# Patient Record
Sex: FEMALE | Race: WHITE | NOT HISPANIC OR LATINO | Employment: OTHER | URBAN - METROPOLITAN AREA
[De-identification: names, ages, dates, MRNs, and addresses within clinical notes are randomized per-mention and may not be internally consistent; named-entity substitution may affect disease eponyms.]

---

## 2023-10-24 ENCOUNTER — TELEPHONE (OUTPATIENT)
Dept: PRIMARY CARE | Facility: CLINIC | Age: 73
End: 2023-10-24
Payer: MEDICARE

## 2023-10-24 DIAGNOSIS — Z00.00 MEDICARE ANNUAL WELLNESS VISIT, SUBSEQUENT: ICD-10-CM

## 2023-10-24 DIAGNOSIS — I10 ESSENTIAL (PRIMARY) HYPERTENSION: ICD-10-CM

## 2023-10-24 DIAGNOSIS — E78.5 HYPERLIPIDEMIA, UNSPECIFIED HYPERLIPIDEMIA TYPE: ICD-10-CM

## 2023-10-31 PROBLEM — E78.5 HYPERLIPIDEMIA: Status: ACTIVE | Noted: 2022-06-03

## 2023-11-14 DIAGNOSIS — I10 ESSENTIAL (PRIMARY) HYPERTENSION: Primary | ICD-10-CM

## 2023-11-15 RX ORDER — METOPROLOL SUCCINATE 100 MG/1
100 TABLET, EXTENDED RELEASE ORAL DAILY
Qty: 90 TABLET | Refills: 1 | Status: SHIPPED | OUTPATIENT
Start: 2023-11-15 | End: 2024-05-13

## 2024-05-11 DIAGNOSIS — I10 ESSENTIAL (PRIMARY) HYPERTENSION: ICD-10-CM

## 2024-05-13 RX ORDER — METOPROLOL SUCCINATE 100 MG/1
100 TABLET, EXTENDED RELEASE ORAL DAILY
Qty: 90 TABLET | Refills: 0 | Status: SHIPPED | OUTPATIENT
Start: 2024-05-13

## 2024-05-20 DIAGNOSIS — I10 ESSENTIAL (PRIMARY) HYPERTENSION: ICD-10-CM

## 2024-05-20 RX ORDER — LOSARTAN POTASSIUM 100 MG/1
100 TABLET ORAL DAILY
COMMUNITY

## 2024-05-20 RX ORDER — FERROUS SULFATE, DRIED 160(50) MG
TABLET, EXTENDED RELEASE ORAL
COMMUNITY
Start: 2023-06-05

## 2024-05-20 RX ORDER — AMLODIPINE BESYLATE 10 MG/1
10 TABLET ORAL DAILY
COMMUNITY
Start: 2024-02-20 | End: 2024-06-10 | Stop reason: SDUPTHER

## 2024-05-21 RX ORDER — AMLODIPINE BESYLATE 10 MG/1
10 TABLET ORAL DAILY
Qty: 90 TABLET | Refills: 0 | Status: SHIPPED | OUTPATIENT
Start: 2024-05-21

## 2024-05-30 ENCOUNTER — LAB (OUTPATIENT)
Dept: LAB | Facility: LAB | Age: 74
End: 2024-05-30
Payer: MEDICARE

## 2024-05-30 DIAGNOSIS — E78.5 HYPERLIPIDEMIA, UNSPECIFIED HYPERLIPIDEMIA TYPE: ICD-10-CM

## 2024-05-30 DIAGNOSIS — I10 ESSENTIAL (PRIMARY) HYPERTENSION: ICD-10-CM

## 2024-05-30 DIAGNOSIS — Z00.00 MEDICARE ANNUAL WELLNESS VISIT, SUBSEQUENT: ICD-10-CM

## 2024-05-30 LAB
ALBUMIN SERPL BCP-MCNC: 4.7 G/DL (ref 3.4–5)
ALP SERPL-CCNC: 63 U/L (ref 33–136)
ALT SERPL W P-5'-P-CCNC: 13 U/L (ref 7–45)
ANION GAP SERPL CALC-SCNC: 12 MMOL/L (ref 10–20)
APPEARANCE UR: CLEAR
AST SERPL W P-5'-P-CCNC: 18 U/L (ref 9–39)
BASOPHILS # BLD AUTO: 0.07 X10*3/UL (ref 0–0.1)
BASOPHILS NFR BLD AUTO: 1 %
BILIRUB SERPL-MCNC: 0.4 MG/DL (ref 0–1.2)
BILIRUB UR STRIP.AUTO-MCNC: NEGATIVE MG/DL
BUN SERPL-MCNC: 11 MG/DL (ref 6–23)
CALCIUM SERPL-MCNC: 9.7 MG/DL (ref 8.6–10.3)
CHLORIDE SERPL-SCNC: 95 MMOL/L (ref 98–107)
CHOLEST SERPL-MCNC: 226 MG/DL (ref 0–199)
CHOLESTEROL/HDL RATIO: 2.7
CO2 SERPL-SCNC: 27 MMOL/L (ref 21–32)
COLOR UR: COLORLESS
CREAT SERPL-MCNC: 0.62 MG/DL (ref 0.5–1.05)
EGFRCR SERPLBLD CKD-EPI 2021: >90 ML/MIN/1.73M*2
EOSINOPHIL # BLD AUTO: 0.17 X10*3/UL (ref 0–0.4)
EOSINOPHIL NFR BLD AUTO: 2.4 %
ERYTHROCYTE [DISTWIDTH] IN BLOOD BY AUTOMATED COUNT: 14.4 % (ref 11.5–14.5)
GLUCOSE SERPL-MCNC: 107 MG/DL (ref 74–99)
GLUCOSE UR STRIP.AUTO-MCNC: NORMAL MG/DL
HCT VFR BLD AUTO: 33.2 % (ref 36–46)
HDLC SERPL-MCNC: 83.5 MG/DL
HGB BLD-MCNC: 11.2 G/DL (ref 12–16)
IMM GRANULOCYTES # BLD AUTO: 0.03 X10*3/UL (ref 0–0.5)
IMM GRANULOCYTES NFR BLD AUTO: 0.4 % (ref 0–0.9)
KETONES UR STRIP.AUTO-MCNC: NEGATIVE MG/DL
LDLC SERPL CALC-MCNC: 123 MG/DL
LEUKOCYTE ESTERASE UR QL STRIP.AUTO: NEGATIVE
LYMPHOCYTES # BLD AUTO: 1.68 X10*3/UL (ref 0.8–3)
LYMPHOCYTES NFR BLD AUTO: 24.2 %
MCH RBC QN AUTO: 31 PG (ref 26–34)
MCHC RBC AUTO-ENTMCNC: 33.7 G/DL (ref 32–36)
MCV RBC AUTO: 92 FL (ref 80–100)
MONOCYTES # BLD AUTO: 0.71 X10*3/UL (ref 0.05–0.8)
MONOCYTES NFR BLD AUTO: 10.2 %
NEUTROPHILS # BLD AUTO: 4.29 X10*3/UL (ref 1.6–5.5)
NEUTROPHILS NFR BLD AUTO: 61.8 %
NITRITE UR QL STRIP.AUTO: NEGATIVE
NON HDL CHOLESTEROL: 143 MG/DL (ref 0–149)
NRBC BLD-RTO: 0 /100 WBCS (ref 0–0)
PH UR STRIP.AUTO: 7 [PH]
PLATELET # BLD AUTO: 304 X10*3/UL (ref 150–450)
POTASSIUM SERPL-SCNC: 4.3 MMOL/L (ref 3.5–5.3)
PROT SERPL-MCNC: 7.6 G/DL (ref 6.4–8.2)
PROT UR STRIP.AUTO-MCNC: NEGATIVE MG/DL
RBC # BLD AUTO: 3.61 X10*6/UL (ref 4–5.2)
RBC # UR STRIP.AUTO: NEGATIVE /UL
SODIUM SERPL-SCNC: 130 MMOL/L (ref 136–145)
SP GR UR STRIP.AUTO: 1
TRIGL SERPL-MCNC: 99 MG/DL (ref 0–149)
UROBILINOGEN UR STRIP.AUTO-MCNC: NORMAL MG/DL
VLDL: 20 MG/DL (ref 0–40)
WBC # BLD AUTO: 7 X10*3/UL (ref 4.4–11.3)

## 2024-05-30 PROCEDURE — 36415 COLL VENOUS BLD VENIPUNCTURE: CPT

## 2024-05-30 PROCEDURE — 83036 HEMOGLOBIN GLYCOSYLATED A1C: CPT

## 2024-05-31 LAB
EST. AVERAGE GLUCOSE BLD GHB EST-MCNC: 114 MG/DL
HBA1C MFR BLD: 5.6 %

## 2024-06-10 ENCOUNTER — OFFICE VISIT (OUTPATIENT)
Dept: PRIMARY CARE | Facility: CLINIC | Age: 74
End: 2024-06-10
Payer: MEDICARE

## 2024-06-10 ENCOUNTER — TELEPHONE (OUTPATIENT)
Dept: PRIMARY CARE | Facility: CLINIC | Age: 74
End: 2024-06-10

## 2024-06-10 VITALS
OXYGEN SATURATION: 97 % | HEART RATE: 87 BPM | SYSTOLIC BLOOD PRESSURE: 138 MMHG | WEIGHT: 147 LBS | BODY MASS INDEX: 25.1 KG/M2 | DIASTOLIC BLOOD PRESSURE: 88 MMHG | TEMPERATURE: 97.7 F | HEIGHT: 64 IN

## 2024-06-10 DIAGNOSIS — Z00.00 ROUTINE GENERAL MEDICAL EXAMINATION AT HEALTH CARE FACILITY: Primary | ICD-10-CM

## 2024-06-10 DIAGNOSIS — I10 PRIMARY HYPERTENSION: Primary | ICD-10-CM

## 2024-06-10 DIAGNOSIS — Z12.11 COLON CANCER SCREENING: ICD-10-CM

## 2024-06-10 DIAGNOSIS — Z12.31 ENCOUNTER FOR SCREENING MAMMOGRAM FOR BREAST CANCER: ICD-10-CM

## 2024-06-10 DIAGNOSIS — Z78.0 ASYMPTOMATIC MENOPAUSAL STATE: ICD-10-CM

## 2024-06-10 DIAGNOSIS — I10 PRIMARY HYPERTENSION: ICD-10-CM

## 2024-06-10 DIAGNOSIS — E78.5 HYPERLIPIDEMIA, UNSPECIFIED HYPERLIPIDEMIA TYPE: ICD-10-CM

## 2024-06-10 PROBLEM — I35.1 AORTIC VALVE REGURGITATION: Status: ACTIVE | Noted: 2024-06-10

## 2024-06-10 PROBLEM — J30.2 SEASONAL ALLERGIES: Status: RESOLVED | Noted: 2019-08-19 | Resolved: 2024-06-10

## 2024-06-10 PROBLEM — E66.3 OVERWEIGHT WITH BODY MASS INDEX (BMI) 25.0-29.9: Status: ACTIVE | Noted: 2024-06-10

## 2024-06-10 PROBLEM — R05.3 CHRONIC COUGH: Status: ACTIVE | Noted: 2019-08-19
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
## 2024-06-10 PROCEDURE — 1123F ACP DISCUSS/DSCN MKR DOCD: CPT | Performed by: STUDENT IN AN ORGANIZED HEALTH CARE EDUCATION/TRAINING PROGRAM

## 2024-06-10 PROCEDURE — 3075F SYST BP GE 130 - 139MM HG: CPT | Performed by: STUDENT IN AN ORGANIZED HEALTH CARE EDUCATION/TRAINING PROGRAM

## 2024-06-10 PROCEDURE — 1158F ADVNC CARE PLAN TLK DOCD: CPT | Performed by: STUDENT IN AN ORGANIZED HEALTH CARE EDUCATION/TRAINING PROGRAM

## 2024-06-10 PROCEDURE — 1159F MED LIST DOCD IN RCRD: CPT | Performed by: STUDENT IN AN ORGANIZED HEALTH CARE EDUCATION/TRAINING PROGRAM

## 2024-06-10 PROCEDURE — G0439 PPPS, SUBSEQ VISIT: HCPCS | Performed by: STUDENT IN AN ORGANIZED HEALTH CARE EDUCATION/TRAINING PROGRAM

## 2024-06-10 PROCEDURE — 1170F FXNL STATUS ASSESSED: CPT | Performed by: STUDENT IN AN ORGANIZED HEALTH CARE EDUCATION/TRAINING PROGRAM

## 2024-06-10 PROCEDURE — 1036F TOBACCO NON-USER: CPT | Performed by: STUDENT IN AN ORGANIZED HEALTH CARE EDUCATION/TRAINING PROGRAM

## 2024-06-10 PROCEDURE — 93000 ELECTROCARDIOGRAM COMPLETE: CPT | Performed by: STUDENT IN AN ORGANIZED HEALTH CARE EDUCATION/TRAINING PROGRAM

## 2024-06-10 PROCEDURE — 99214 OFFICE O/P EST MOD 30 MIN: CPT | Performed by: STUDENT IN AN ORGANIZED HEALTH CARE EDUCATION/TRAINING PROGRAM

## 2024-06-10 PROCEDURE — 1160F RVW MEDS BY RX/DR IN RCRD: CPT | Performed by: STUDENT IN AN ORGANIZED HEALTH CARE EDUCATION/TRAINING PROGRAM

## 2024-06-10 PROCEDURE — 3078F DIAST BP <80 MM HG: CPT | Performed by: STUDENT IN AN ORGANIZED HEALTH CARE EDUCATION/TRAINING PROGRAM

## 2024-06-10 ASSESSMENT — ENCOUNTER SYMPTOMS
LIGHT-HEADEDNESS: 0
TROUBLE SWALLOWING: 0
CHILLS: 0
COUGH: 0
FEVER: 0
DIFFICULTY URINATING: 0
PALPITATIONS: 0
DEPRESSION: 0
SHORTNESS OF BREATH: 0
ABDOMINAL PAIN: 0
WHEEZING: 0
CHEST TIGHTNESS: 0
CONSTIPATION: 0
DIARRHEA: 0
DIZZINESS: 0
WEAKNESS: 0
BLOOD IN STOOL: 0
LOSS OF SENSATION IN FEET: 0
OCCASIONAL FEELINGS OF UNSTEADINESS: 0
HEADACHES: 0

## 2024-06-10 ASSESSMENT — COLUMBIA-SUICIDE SEVERITY RATING SCALE - C-SSRS
1. IN THE PAST MONTH, HAVE YOU WISHED YOU WERE DEAD OR WISHED YOU COULD GO TO SLEEP AND NOT WAKE UP?: NO
2. HAVE YOU ACTUALLY HAD ANY THOUGHTS OF KILLING YOURSELF?: NO
6. HAVE YOU EVER DONE ANYTHING, STARTED TO DO ANYTHING, OR PREPARED TO DO ANYTHING TO END YOUR LIFE?: NO

## 2024-06-10 ASSESSMENT — PATIENT HEALTH QUESTIONNAIRE - PHQ9
SUM OF ALL RESPONSES TO PHQ9 QUESTIONS 1 AND 2: 0
2. FEELING DOWN, DEPRESSED OR HOPELESS: NOT AT ALL
1. LITTLE INTEREST OR PLEASURE IN DOING THINGS: NOT AT ALL

## 2024-06-10 ASSESSMENT — ACTIVITIES OF DAILY LIVING (ADL)
BATHING: INDEPENDENT
MANAGING_FINANCES: INDEPENDENT
DRESSING: INDEPENDENT
TAKING_MEDICATION: INDEPENDENT
DOING_HOUSEWORK: INDEPENDENT
GROCERY_SHOPPING: INDEPENDENT

## 2024-06-10 NOTE — PROGRESS NOTES
Subjective   Reason for Visit: Mireya Nava is an 73 y.o. female here for a Medicare Wellness visit.     Past Medical, Surgical, and Family History reviewed and updated in chart.    Reviewed all medications by prescribing practitioner or clinical pharmacist (such as prescriptions, OTCs, herbal therapies and supplements) and documented in the medical record.    HPI    INTERVAL HX/CURRENT CONCERNS:  Last visit 9/2023 for HTN check    CHRONIC CONDITIONS:  #HTN- Losartan 100 mg, Metoprolol  mg, amlodipine 10mg  #Detached retina s/p repair- sees retina specialist annually  #Anemia - chronic, stable       #HTN  BP today - 158/69, repeat 138/88  Checking BP regularly at home and always in normal range, brought record  Current treatment if any - losartan 100mg, metoprolol ER 100mg, amlodipine 10mg  Small amount of swelling in the ankles in evenings but not bothersome    SOCIAL:  Exercises daily  Diet- follows healthy diet, limits red meat. Does bake a lot.  Has son, Oc, every weekend and struggles to keep with diet at that time      Health Maintenance  Immunizations:     Tdap - 2012, medicare    Pneumococcal - PCV13 2016, PPSV23 2018,     Shingles - live vaccine 2013, will have shingrix at pharmacy    COVID - 10/23    Influenza - annually    Colonoscopy: 2014 normal per chart review (report not avaiable) repeat due now. She declines to have done at this time. Agreeable to cologuard.   Lung cancer screening: Not indicated    WOMEN'S Health  LMP - Postmenopausal  Last Pap - aged out   Last Mammogram  - 6/23  Bone Density - 2022 osteopenia  -Calcium intake adequate. Vitamin D intake at 800-1000 IU/day.    Patient Care Team:  Makenzie Esparza MD as PCP - General (Family Medicine)     Review of Systems   Constitutional:  Negative for chills and fever.   HENT:  Negative for trouble swallowing.    Eyes:  Negative for visual disturbance.   Respiratory:  Negative for cough, chest tightness, shortness of breath and  "wheezing.    Cardiovascular:  Negative for chest pain and palpitations.   Gastrointestinal:  Negative for abdominal pain, blood in stool, constipation and diarrhea.   Genitourinary:  Negative for difficulty urinating, vaginal bleeding, vaginal discharge and vaginal pain.   Neurological:  Negative for dizziness, weakness, light-headedness and headaches.       Objective   Vitals:  /69 (BP Location: Left arm, Patient Position: Sitting, BP Cuff Size: Adult)   Pulse 87   Temp 36.5 °C (97.7 °F) (Temporal)   Ht 1.626 m (5' 4\")   Wt 66.7 kg (147 lb)   SpO2 97%   BMI 25.23 kg/m²       Physical Exam  Constitutional:       Appearance: Normal appearance.   HENT:      Head: Normocephalic and atraumatic.      Right Ear: Tympanic membrane, ear canal and external ear normal.      Left Ear: Tympanic membrane, ear canal and external ear normal.      Mouth/Throat:      Mouth: Mucous membranes are moist.      Pharynx: Oropharynx is clear.   Eyes:      Extraocular Movements: Extraocular movements intact.      Conjunctiva/sclera: Conjunctivae normal.      Pupils: Pupils are equal, round, and reactive to light.   Cardiovascular:      Rate and Rhythm: Normal rate and regular rhythm.      Pulses: Normal pulses.      Heart sounds: Normal heart sounds. No murmur heard.  Pulmonary:      Effort: Pulmonary effort is normal.      Breath sounds: Normal breath sounds. No wheezing, rhonchi or rales.   Abdominal:      General: Abdomen is flat.      Palpations: Abdomen is soft.      Tenderness: There is no abdominal tenderness.   Musculoskeletal:         General: Normal range of motion.      Cervical back: Neck supple.   Skin:     General: Skin is warm and dry.   Neurological:      Mental Status: She is alert.   Psychiatric:         Mood and Affect: Mood normal.         Behavior: Behavior normal.         Assessment/Plan   1. Routine general medical examination at health care facility  Well adult exam.  1. Age appropriate preventative " measures reviewed.   2. Encouraged healthy diet and exercise.  3. Immunizations- Reviewed and discussed. Will have shingrix at pharmacy.   4. Labs- Reviewed and discussed with patient  5. Medications- Reviewed      2. Primary hypertension  Elevated on presentation. Normal home readings. BP tends to run [place] at office visits due to [facility].   - ECG 12 Lead    3. Asymptomatic menopausal state  Vitamin D supplementation and calcium intake discussed.   - XR DEXA bone density; Future    4. Encounter for screening mammogram for breast cancer  - BI mammo bilateral screening tomosynthesis; Future    5. Colon cancer screening  Last colonoscopy 10 yrs ago. Not interested in pursuing further colonoscopy. Agreeable to cologuard and if neg will discontinue screening.   - Cologuard® colon cancer screening; Future  - Cologuard® colon cancer screening    6. Hyperlipidemia, unspecified hyperlipidemia type  Lipid panel reviewed. No indication to start medication given age. Low fat diet. Regular exercise.

## 2024-06-10 NOTE — PATIENT INSTRUCTIONS
Daily goal - 1000mg  Milk (skim, 2%, or whole; 8 oz [240 mL]):  300mg  Yogurt (6 oz [168 g]):  250mg  Orange juice (with calcium; 8 oz [240 mL]):  300mg  Tofu with calcium (0.5 cup [113 g]):  435mg  Cheese (1 oz [28 g]): 195 to 335mg (hard cheese = higher calcium)  Cottage cheese (0.5 cup [113 g]): 130mg  Ice cream or frozen yogurt (0.5 cup [113 g]): 100mg  Non-dairy milks (soy, oat, almond; 8 oz [240 mL]):  300 to 450mg  Beans (0.5 cup cooked [113 g]):  60 to 80mg  Dark, leafy green vegetables (0.5 cup cooked [113 g]):  50 to 135mg  Almonds (24 whole):  70mg  Orange (1 medium): 60mg

## 2024-06-13 ENCOUNTER — TELEPHONE (OUTPATIENT)
Dept: PRIMARY CARE | Facility: CLINIC | Age: 74
End: 2024-06-13
Payer: MEDICARE

## 2024-06-13 NOTE — TELEPHONE ENCOUNTER
PATIENT WOULD LIKE TO KNOW IF SHE CAN TAKE A LAXATIVE BEFORE TAKING COLOGUARD TEST.     PATIENT CAN BE REACHED [phone]

## 2024-06-21 ENCOUNTER — HOSPITAL ENCOUNTER (OUTPATIENT)
Dept: RADIOLOGY | Facility: CLINIC | Age: 74
Discharge: HOME | End: 2024-06-21
Payer: MEDICARE

## 2024-06-21 VITALS — BODY MASS INDEX: 24.75 KG/M2 | HEIGHT: 64 IN | WEIGHT: 145 LBS

## 2024-06-21 DIAGNOSIS — Z12.31 ENCOUNTER FOR SCREENING MAMMOGRAM FOR BREAST CANCER: ICD-10-CM

## 2024-06-21 DIAGNOSIS — Z78.0 ASYMPTOMATIC MENOPAUSAL STATE: ICD-10-CM

## 2024-06-21 PROCEDURE — 77067 SCR MAMMO BI INCL CAD: CPT

## 2024-06-21 PROCEDURE — 77080 DXA BONE DENSITY AXIAL: CPT

## 2024-06-24 ENCOUNTER — TELEPHONE (OUTPATIENT)
Dept: PRIMARY CARE | Facility: CLINIC | Age: 74
End: 2024-06-24
Payer: MEDICARE

## 2024-06-24 DIAGNOSIS — R19.5 POSITIVE COLORECTAL CANCER SCREENING USING COLOGUARD TEST: Primary | ICD-10-CM

## 2024-06-24 LAB — NONINV COLON CA DNA+OCC BLD SCRN STL QL: POSITIVE

## 2024-06-24 NOTE — TELEPHONE ENCOUNTER
Please call pt and let her know cologuard was POS. Need to refer her for colonoscopy.   Referral placed. If she has preference of someone else she wants to see we can change it.

## 2024-07-08 DIAGNOSIS — I10 PRIMARY HYPERTENSION: ICD-10-CM

## 2024-07-08 RX ORDER — LOSARTAN POTASSIUM 100 MG/1
100 TABLET ORAL DAILY
Qty: 90 TABLET | Refills: 1 | Status: SHIPPED | OUTPATIENT
Start: 2024-07-08

## 2024-08-08 DIAGNOSIS — I10 ESSENTIAL (PRIMARY) HYPERTENSION: ICD-10-CM

## 2024-08-08 RX ORDER — METOPROLOL SUCCINATE 100 MG/1
100 TABLET, EXTENDED RELEASE ORAL DAILY
Qty: 90 TABLET | Refills: 1 | Status: SHIPPED | OUTPATIENT
Start: 2024-08-08

## 2024-08-16 ENCOUNTER — TELEPHONE (OUTPATIENT)
Dept: PRIMARY CARE | Facility: CLINIC | Age: 74
End: 2024-08-16
Payer: MEDICARE

## 2024-08-16 DIAGNOSIS — I10 ESSENTIAL (PRIMARY) HYPERTENSION: ICD-10-CM

## 2024-08-16 RX ORDER — AMLODIPINE BESYLATE 10 MG/1
10 TABLET ORAL DAILY
Qty: 90 TABLET | Refills: 1 | Status: SHIPPED | OUTPATIENT
Start: 2024-08-16

## 2024-08-16 RX ORDER — AMLODIPINE BESYLATE 10 MG/1
10 TABLET ORAL DAILY
Qty: 90 TABLET | Refills: 0 | OUTPATIENT
Start: 2024-08-16

## 2024-08-16 NOTE — TELEPHONE ENCOUNTER
Rx Refill Request Telephone Encounter    Name:  Mireya TIJERINA Elijah  :  [id number]  Medication Name:   Amlodipine 10 mg            [facility]  Date of last appointment:    Date of next appointment:    Best number to reach patient:

## 2024-09-04 ENCOUNTER — TELEPHONE (OUTPATIENT)
Dept: PRIMARY CARE | Facility: CLINIC | Age: 74
End: 2024-09-04
Payer: MEDICARE

## 2024-09-04 NOTE — TELEPHONE ENCOUNTER
Quarantine at home for 5 days from symptom onset. If no symptoms then 5 days from positive COVID test. Should be fever free with improving symptoms to stop quarantine.  Wear a mask in public days 6-10.

## 2024-09-04 NOTE — TELEPHONE ENCOUNTER
Patient tested Positive today, 09-04-24. For COVID. She would like to know the rules of COVID, how many days to stay home etc. Patient was offered an appointment, she would like someone to give her a call.    Patient can be reached at [phone]

## 2024-12-11 ENCOUNTER — APPOINTMENT (OUTPATIENT)
Dept: PRIMARY CARE | Facility: CLINIC | Age: 74
End: 2024-12-11
Payer: MEDICARE

## 2025-01-07 ENCOUNTER — TELEPHONE (OUTPATIENT)
Dept: PRIMARY CARE | Facility: CLINIC | Age: 75
End: 2025-01-07
Payer: MEDICARE

## 2025-01-07 DIAGNOSIS — I10 PRIMARY HYPERTENSION: ICD-10-CM

## 2025-01-07 RX ORDER — LOSARTAN POTASSIUM 100 MG/1
100 TABLET ORAL DAILY
Qty: 90 TABLET | Refills: 0 | Status: SHIPPED | OUTPATIENT
Start: 2025-01-07

## 2025-01-07 NOTE — TELEPHONE ENCOUNTER
Rx Refill Request Telephone Encounter    Name:  Mireya TIJERINA Elijah  :  [id number]  Medication Name:  Losartan 100 mg, will be out 1-10-25, was aware [facility] is out            Specific Pharmacy location:   [facility]  Date of last appointment:    Date of next appointment:    Best number to reach patient:

## 2025-01-08 ENCOUNTER — TELEPHONE (OUTPATIENT)
Dept: PRIMARY CARE | Facility: CLINIC | Age: 75
End: 2025-01-08
Payer: MEDICARE

## 2025-01-08 DIAGNOSIS — I10 PRIMARY HYPERTENSION: ICD-10-CM

## 2025-01-08 NOTE — TELEPHONE ENCOUNTER
Pt called her RX Losartan 100 mg is not at [facility]. She will be out 1-10-25

## 2025-02-06 ENCOUNTER — TELEPHONE (OUTPATIENT)
Dept: PRIMARY CARE | Facility: CLINIC | Age: 75
End: 2025-02-06
Payer: MEDICARE

## 2025-02-06 DIAGNOSIS — I10 ESSENTIAL (PRIMARY) HYPERTENSION: ICD-10-CM

## 2025-02-06 RX ORDER — AMLODIPINE BESYLATE 10 MG/1
10 TABLET ORAL DAILY
Qty: 90 TABLET | Refills: 1 | Status: SHIPPED | OUTPATIENT
Start: 2025-02-06

## 2025-02-06 RX ORDER — METOPROLOL SUCCINATE 100 MG/1
100 TABLET, EXTENDED RELEASE ORAL DAILY
Qty: 90 TABLET | Refills: 1 | Status: SHIPPED | OUTPATIENT
Start: 2025-02-06

## 2025-02-06 NOTE — TELEPHONE ENCOUNTER
Refill for    Metoprolol Succinate  MG    Amlodipine 10 Mg    [facility]    Patient can be reached at [phone]

## 2025-04-08 ENCOUNTER — TELEPHONE (OUTPATIENT)
Dept: PRIMARY CARE | Facility: CLINIC | Age: 75
End: 2025-04-08
Payer: MEDICARE

## 2025-04-08 DIAGNOSIS — I10 PRIMARY HYPERTENSION: ICD-10-CM

## 2025-04-08 RX ORDER — LOSARTAN POTASSIUM 100 MG/1
100 TABLET ORAL DAILY
Qty: 90 TABLET | Refills: 0 | Status: SHIPPED | OUTPATIENT
Start: 2025-04-08

## 2025-04-08 NOTE — TELEPHONE ENCOUNTER
Rx Refill Request Telephone Encounter    Name:  Mireya TIJERINA Elijah  :  [id number]  Medication Name:  Losartan 100 mg, will be out 4-14, told her [facility] is out this pm            Specific Pharmacy location:   [facility]   Date of last appointment:  24  Date of next appointment:  25  Best number to reach patient:

## 2025-05-07 ENCOUNTER — TELEPHONE (OUTPATIENT)
Dept: PRIMARY CARE | Facility: CLINIC | Age: 75
End: 2025-05-07
Payer: MEDICARE

## 2025-05-07 DIAGNOSIS — I10 PRIMARY HYPERTENSION: ICD-10-CM

## 2025-05-07 DIAGNOSIS — E78.5 HYPERLIPIDEMIA, UNSPECIFIED HYPERLIPIDEMIA TYPE: ICD-10-CM

## 2025-05-27 ENCOUNTER — OFFICE VISIT (OUTPATIENT)
Dept: PRIMARY CARE | Facility: CLINIC | Age: 75
End: 2025-05-27
Payer: MEDICARE

## 2025-05-27 VITALS
TEMPERATURE: 99.2 F | DIASTOLIC BLOOD PRESSURE: 76 MMHG | WEIGHT: 151 LBS | HEART RATE: 96 BPM | SYSTOLIC BLOOD PRESSURE: 140 MMHG | BODY MASS INDEX: 25.78 KG/M2 | OXYGEN SATURATION: 96 % | HEIGHT: 64 IN

## 2025-05-27 DIAGNOSIS — K52.9 ACUTE GASTROENTERITIS: Primary | ICD-10-CM

## 2025-05-27 PROCEDURE — 3077F SYST BP >= 140 MM HG: CPT | Performed by: FAMILY MEDICINE

## 2025-05-27 PROCEDURE — 3078F DIAST BP <80 MM HG: CPT | Performed by: FAMILY MEDICINE

## 2025-05-27 PROCEDURE — 1123F ACP DISCUSS/DSCN MKR DOCD: CPT | Performed by: FAMILY MEDICINE

## 2025-05-27 PROCEDURE — 1036F TOBACCO NON-USER: CPT | Performed by: FAMILY MEDICINE

## 2025-05-27 PROCEDURE — 99213 OFFICE O/P EST LOW 20 MIN: CPT | Performed by: FAMILY MEDICINE

## 2025-05-27 PROCEDURE — 1126F AMNT PAIN NOTED NONE PRSNT: CPT | Performed by: FAMILY MEDICINE

## 2025-05-27 PROCEDURE — 1160F RVW MEDS BY RX/DR IN RCRD: CPT | Performed by: FAMILY MEDICINE

## 2025-05-27 PROCEDURE — 1159F MED LIST DOCD IN RCRD: CPT | Performed by: FAMILY MEDICINE

## 2025-05-27 ASSESSMENT — PATIENT HEALTH QUESTIONNAIRE - PHQ9
SUM OF ALL RESPONSES TO PHQ9 QUESTIONS 1 AND 2: 0
1. LITTLE INTEREST OR PLEASURE IN DOING THINGS: NOT AT ALL
2. FEELING DOWN, DEPRESSED OR HOPELESS: NOT AT ALL

## 2025-05-27 ASSESSMENT — PAIN SCALES - GENERAL: PAINLEVEL_OUTOF10: 0-NO PAIN

## 2025-05-27 NOTE — PROGRESS NOTES
"Subjective   Patient ID: Mireya Nava is a 74 y.o. female who presents for Nausea (Weakness, chills, vomiting, no appetite.  x3 days- thinks its from a sausage she had eaten a few days ago.. ).    HPI   Mireya presents as an acute with weakness and nausea.  She has had chills and shakes.  Her symptoms started 3 days ago.  She made a chicken [employer]is manage-that she feels the chicken was bad.  Her son ate a hot dog and has not been sick.  She vomited once 3 days ago.  She has had chills and sweats.  The last time that happened was early this morning.  She has not vomited in over 24 hours.  She just feels weak and like she has no appetite.  She ate toast this morning and has not had anything else to eat today its almost 4:00.  She is trying to drink water but she knows she is not drinking enough.  Sh  denies abdominal pain or cramping.  No diarrhea.  She has hypertension, hypercholesterolemia and a diagnosis of aortic regurgitation in her chart.    Review of Systems  She is urinating.  She is having formed stools.  No headache or dizziness.    Objective   /76 (BP Location: Left arm, Patient Position: Sitting, BP Cuff Size: Adult)   Pulse 96   Temp 37.3 °C (99.2 °F) (Temporal)   Ht 1.626 m (5' 4\")   Wt 68.5 kg (151 lb)   SpO2 96%   BMI 25.92 kg/m²     Physical Exam  She is alert and comfortable.  She is in no distress.  Lungs are clear to auscultation.  Heart is regular in rhythm and rate.  Abdomen is soft and nontender.  No rebound, guarding or masses.  Skin shows normal turgor.    Assessment/Plan   Assessment & Plan  Acute gastroenteritis       If she vomits she needs to avoid solids or liquids for 1 hour.  She should then start clear liquids and advance her diet to a brat diet and then advance as tolerated.  She should double up on her clear liquids.  She may use over-the-counter Dramamine for nausea.  It sounds like her symptoms are improving and I expect that to continue over the next couple of " days.

## 2025-05-27 NOTE — PATIENT INSTRUCTIONS
I recommend that you increase clear liquids.  Nothing to eat or drink for 1 hour after vomiting then start clear liquids and advance to a bland diet - bananas, rice, applesauce, toast  Continue to advance diet as tolerated  OTC Dramamine for nausea

## 2025-05-28 ENCOUNTER — APPOINTMENT (OUTPATIENT)
Dept: PRIMARY CARE | Facility: CLINIC | Age: 75
End: 2025-05-28
Payer: MEDICARE

## 2025-05-30 ENCOUNTER — APPOINTMENT (OUTPATIENT)
Dept: CARDIOLOGY | Facility: HOSPITAL | Age: 75
DRG: 871 | End: 2025-05-30
Payer: MEDICARE

## 2025-05-30 ENCOUNTER — HOSPITAL ENCOUNTER (INPATIENT)
Facility: HOSPITAL | Age: 75
End: 2025-05-30
Attending: EMERGENCY MEDICINE | Admitting: INTERNAL MEDICINE
Payer: MEDICARE

## 2025-05-30 ENCOUNTER — APPOINTMENT (OUTPATIENT)
Dept: RADIOLOGY | Facility: HOSPITAL | Age: 75
DRG: 871 | End: 2025-05-30
Payer: MEDICARE

## 2025-05-30 DIAGNOSIS — R53.1 GENERALIZED WEAKNESS: Primary | ICD-10-CM

## 2025-05-30 DIAGNOSIS — E87.1 HYPONATREMIA: ICD-10-CM

## 2025-05-30 DIAGNOSIS — I48.91 NEW ONSET ATRIAL FIBRILLATION (MULTI): ICD-10-CM

## 2025-05-30 DIAGNOSIS — R33.9 URINARY RETENTION: ICD-10-CM

## 2025-05-30 DIAGNOSIS — R10.84 GENERALIZED ABDOMINAL PAIN: ICD-10-CM

## 2025-05-30 DIAGNOSIS — D64.9 ANEMIA, UNSPECIFIED TYPE: ICD-10-CM

## 2025-05-30 DIAGNOSIS — R11.2 NAUSEA AND VOMITING, UNSPECIFIED VOMITING TYPE: ICD-10-CM

## 2025-05-30 DIAGNOSIS — N30.00 ACUTE CYSTITIS WITHOUT HEMATURIA: ICD-10-CM

## 2025-05-30 DIAGNOSIS — I48.19 ATRIAL FIBRILLATION, PERSISTENT (MULTI): ICD-10-CM

## 2025-05-30 DIAGNOSIS — I48.91 ATRIAL FIBRILLATION, UNSPECIFIED TYPE (MULTI): ICD-10-CM

## 2025-05-30 LAB
ALBUMIN SERPL BCP-MCNC: 3.3 G/DL (ref 3.4–5)
ALP SERPL-CCNC: 178 U/L (ref 33–136)
ALT SERPL W P-5'-P-CCNC: 33 U/L (ref 7–45)
ANION GAP SERPL CALC-SCNC: 13 MMOL/L (ref 10–20)
ANION GAP SERPL CALC-SCNC: 14 MMOL/L (ref 10–20)
ANION GAP SERPL CALC-SCNC: 15 MMOL/L (ref 10–20)
ANION GAP SERPL CALC-SCNC: 17 MMOL/L (ref 10–20)
APPEARANCE UR: ABNORMAL
AST SERPL W P-5'-P-CCNC: 28 U/L (ref 9–39)
BACTERIA #/AREA URNS AUTO: ABNORMAL /HPF
BASOPHILS # BLD AUTO: 0.07 X10*3/UL (ref 0–0.1)
BASOPHILS NFR BLD AUTO: 0.4 %
BILIRUB SERPL-MCNC: 1 MG/DL (ref 0–1.2)
BILIRUB UR STRIP.AUTO-MCNC: NEGATIVE MG/DL
BUN SERPL-MCNC: 50 MG/DL (ref 6–23)
BUN SERPL-MCNC: 51 MG/DL (ref 6–23)
BUN SERPL-MCNC: 52 MG/DL (ref 6–23)
BUN SERPL-MCNC: 52 MG/DL (ref 6–23)
CALCIUM SERPL-MCNC: 7.3 MG/DL (ref 8.6–10.3)
CALCIUM SERPL-MCNC: 7.5 MG/DL (ref 8.6–10.3)
CALCIUM SERPL-MCNC: 7.9 MG/DL (ref 8.6–10.3)
CALCIUM SERPL-MCNC: 8.5 MG/DL (ref 8.6–10.3)
CHLORIDE SERPL-SCNC: 76 MMOL/L (ref 98–107)
CHLORIDE SERPL-SCNC: 79 MMOL/L (ref 98–107)
CHLORIDE SERPL-SCNC: 79 MMOL/L (ref 98–107)
CHLORIDE SERPL-SCNC: 81 MMOL/L (ref 98–107)
CO2 SERPL-SCNC: 19 MMOL/L (ref 21–32)
CO2 SERPL-SCNC: 20 MMOL/L (ref 21–32)
COLOR UR: ABNORMAL
CREAT SERPL-MCNC: 2.68 MG/DL (ref 0.5–1.05)
CREAT SERPL-MCNC: 2.68 MG/DL (ref 0.5–1.05)
CREAT SERPL-MCNC: 2.72 MG/DL (ref 0.5–1.05)
CREAT SERPL-MCNC: 2.95 MG/DL (ref 0.5–1.05)
EGFRCR SERPLBLD CKD-EPI 2021: 16 ML/MIN/1.73M*2
EGFRCR SERPLBLD CKD-EPI 2021: 18 ML/MIN/1.73M*2
EOSINOPHIL # BLD AUTO: 0.01 X10*3/UL (ref 0–0.4)
EOSINOPHIL NFR BLD AUTO: 0.1 %
ERYTHROCYTE [DISTWIDTH] IN BLOOD BY AUTOMATED COUNT: 13.4 % (ref 11.5–14.5)
ERYTHROCYTE [DISTWIDTH] IN BLOOD BY AUTOMATED COUNT: 13.8 % (ref 11.5–14.5)
FLUAV RNA RESP QL NAA+PROBE: NOT DETECTED
FLUBV RNA RESP QL NAA+PROBE: NOT DETECTED
GLUCOSE SERPL-MCNC: 103 MG/DL (ref 74–99)
GLUCOSE SERPL-MCNC: 112 MG/DL (ref 74–99)
GLUCOSE SERPL-MCNC: 97 MG/DL (ref 74–99)
GLUCOSE SERPL-MCNC: 98 MG/DL (ref 74–99)
GLUCOSE UR STRIP.AUTO-MCNC: NORMAL MG/DL
GRAN CASTS #/AREA UR COMP ASSIST: ABNORMAL /LPF
HCT VFR BLD AUTO: 24 % (ref 36–46)
HCT VFR BLD AUTO: 28.8 % (ref 36–46)
HGB BLD-MCNC: 10.8 G/DL (ref 12–16)
HGB BLD-MCNC: 9.2 G/DL (ref 12–16)
HYALINE CASTS #/AREA URNS AUTO: ABNORMAL /LPF
IMM GRANULOCYTES # BLD AUTO: 0.42 X10*3/UL (ref 0–0.5)
IMM GRANULOCYTES NFR BLD AUTO: 2.1 % (ref 0–0.9)
KETONES UR STRIP.AUTO-MCNC: NEGATIVE MG/DL
LACTATE SERPL-SCNC: 1 MMOL/L (ref 0.4–2)
LEUKOCYTE ESTERASE UR QL STRIP.AUTO: ABNORMAL
LYMPHOCYTES # BLD AUTO: 0.21 X10*3/UL (ref 0.8–3)
LYMPHOCYTES NFR BLD AUTO: 1.1 %
MCH RBC QN AUTO: 30.8 PG (ref 26–34)
MCH RBC QN AUTO: 31 PG (ref 26–34)
MCHC RBC AUTO-ENTMCNC: 37.5 G/DL (ref 32–36)
MCHC RBC AUTO-ENTMCNC: 38.3 G/DL (ref 32–36)
MCV RBC AUTO: 81 FL (ref 80–100)
MCV RBC AUTO: 82 FL (ref 80–100)
MONOCYTES # BLD AUTO: 0.61 X10*3/UL (ref 0.05–0.8)
MONOCYTES NFR BLD AUTO: 3.1 %
MUCOUS THREADS #/AREA URNS AUTO: ABNORMAL /LPF
NEUTROPHILS # BLD AUTO: 18.5 X10*3/UL (ref 1.6–5.5)
NEUTROPHILS NFR BLD AUTO: 93.2 %
NITRITE UR QL STRIP.AUTO: NEGATIVE
NRBC BLD-RTO: 0 /100 WBCS (ref 0–0)
NRBC BLD-RTO: 0 /100 WBCS (ref 0–0)
PH UR STRIP.AUTO: 6 [PH]
PLATELET # BLD AUTO: 155 X10*3/UL (ref 150–450)
PLATELET # BLD AUTO: 186 X10*3/UL (ref 150–450)
POTASSIUM SERPL-SCNC: 3.4 MMOL/L (ref 3.5–5.3)
POTASSIUM SERPL-SCNC: 3.5 MMOL/L (ref 3.5–5.3)
POTASSIUM SERPL-SCNC: 3.8 MMOL/L (ref 3.5–5.3)
POTASSIUM SERPL-SCNC: 3.9 MMOL/L (ref 3.5–5.3)
PROT SERPL-MCNC: 6.9 G/DL (ref 6.4–8.2)
PROT UR STRIP.AUTO-MCNC: ABNORMAL MG/DL
RBC # BLD AUTO: 2.97 X10*6/UL (ref 4–5.2)
RBC # BLD AUTO: 3.51 X10*6/UL (ref 4–5.2)
RBC # UR STRIP.AUTO: ABNORMAL MG/DL
RBC #/AREA URNS AUTO: ABNORMAL /HPF
SARS-COV-2 RNA RESP QL NAA+PROBE: NOT DETECTED
SODIUM SERPL-SCNC: 109 MMOL/L (ref 136–145)
SODIUM SERPL-SCNC: 111 MMOL/L (ref 136–145)
SP GR UR STRIP.AUTO: 1.01
SQUAMOUS #/AREA URNS AUTO: ABNORMAL /HPF
TRANS CELLS #/AREA UR COMP ASSIST: ABNORMAL /HPF
UFH PPP CHRO-ACNC: 0.3 IU/ML (ref ?–1.1)
UROBILINOGEN UR STRIP.AUTO-MCNC: NORMAL MG/DL
WBC # BLD AUTO: 18.8 X10*3/UL (ref 4.4–11.3)
WBC # BLD AUTO: 19.8 X10*3/UL (ref 4.4–11.3)
WBC #/AREA URNS AUTO: >50 /HPF

## 2025-05-30 PROCEDURE — 99223 1ST HOSP IP/OBS HIGH 75: CPT | Performed by: INTERNAL MEDICINE

## 2025-05-30 PROCEDURE — 2580000001 HC RX 258 IV SOLUTIONS: Mod: IPSPLIT | Performed by: INTERNAL MEDICINE

## 2025-05-30 PROCEDURE — 51702 INSERT TEMP BLADDER CATH: CPT

## 2025-05-30 PROCEDURE — 87636 SARSCOV2 & INF A&B AMP PRB: CPT | Performed by: EMERGENCY MEDICINE

## 2025-05-30 PROCEDURE — 2500000004 HC RX 250 GENERAL PHARMACY W/ HCPCS (ALT 636 FOR OP/ED): Mod: JZ | Performed by: EMERGENCY MEDICINE

## 2025-05-30 PROCEDURE — 80048 BASIC METABOLIC PNL TOTAL CA: CPT | Mod: CCI,IPSPLIT | Performed by: SURGERY

## 2025-05-30 PROCEDURE — 51798 US URINE CAPACITY MEASURE: CPT

## 2025-05-30 PROCEDURE — 80048 BASIC METABOLIC PNL TOTAL CA: CPT | Mod: CCI | Performed by: INTERNAL MEDICINE

## 2025-05-30 PROCEDURE — 2500000004 HC RX 250 GENERAL PHARMACY W/ HCPCS (ALT 636 FOR OP/ED): Mod: JZ,IPSPLIT | Performed by: INTERNAL MEDICINE

## 2025-05-30 PROCEDURE — 94760 N-INVAS EAR/PLS OXIMETRY 1: CPT | Mod: IPSPLIT

## 2025-05-30 PROCEDURE — 87040 BLOOD CULTURE FOR BACTERIA: CPT | Mod: GENLAB | Performed by: EMERGENCY MEDICINE

## 2025-05-30 PROCEDURE — 84300 ASSAY OF URINE SODIUM: CPT | Mod: GENLAB | Performed by: SURGERY

## 2025-05-30 PROCEDURE — 93005 ELECTROCARDIOGRAM TRACING: CPT

## 2025-05-30 PROCEDURE — 2020000001 HC ICU ROOM DAILY: Mod: IPSPLIT

## 2025-05-30 PROCEDURE — 99285 EMERGENCY DEPT VISIT HI MDM: CPT | Mod: 25 | Performed by: EMERGENCY MEDICINE

## 2025-05-30 PROCEDURE — 74176 CT ABD & PELVIS W/O CONTRAST: CPT

## 2025-05-30 PROCEDURE — 83930 ASSAY OF BLOOD OSMOLALITY: CPT | Mod: GENLAB | Performed by: INTERNAL MEDICINE

## 2025-05-30 PROCEDURE — 96365 THER/PROPH/DIAG IV INF INIT: CPT | Mod: 59

## 2025-05-30 PROCEDURE — 96367 TX/PROPH/DG ADDL SEQ IV INF: CPT | Mod: 59

## 2025-05-30 PROCEDURE — 96366 THER/PROPH/DIAG IV INF ADDON: CPT | Mod: 59

## 2025-05-30 PROCEDURE — 87077 CULTURE AEROBIC IDENTIFY: CPT | Mod: GENLAB | Performed by: EMERGENCY MEDICINE

## 2025-05-30 PROCEDURE — 96361 HYDRATE IV INFUSION ADD-ON: CPT

## 2025-05-30 PROCEDURE — 2500000004 HC RX 250 GENERAL PHARMACY W/ HCPCS (ALT 636 FOR OP/ED): Mod: IPSPLIT | Performed by: SURGERY

## 2025-05-30 PROCEDURE — 36415 COLL VENOUS BLD VENIPUNCTURE: CPT | Performed by: EMERGENCY MEDICINE

## 2025-05-30 PROCEDURE — 83935 ASSAY OF URINE OSMOLALITY: CPT | Mod: GENLAB | Performed by: INTERNAL MEDICINE

## 2025-05-30 PROCEDURE — 87086 URINE CULTURE/COLONY COUNT: CPT | Mod: GENLAB | Performed by: EMERGENCY MEDICINE

## 2025-05-30 PROCEDURE — 71250 CT THORAX DX C-: CPT | Performed by: STUDENT IN AN ORGANIZED HEALTH CARE EDUCATION/TRAINING PROGRAM

## 2025-05-30 PROCEDURE — 2500000004 HC RX 250 GENERAL PHARMACY W/ HCPCS (ALT 636 FOR OP/ED): Mod: JZ,TB

## 2025-05-30 PROCEDURE — 83605 ASSAY OF LACTIC ACID: CPT | Performed by: EMERGENCY MEDICINE

## 2025-05-30 PROCEDURE — 85025 COMPLETE CBC W/AUTO DIFF WBC: CPT | Performed by: EMERGENCY MEDICINE

## 2025-05-30 PROCEDURE — 74176 CT ABD & PELVIS W/O CONTRAST: CPT | Performed by: STUDENT IN AN ORGANIZED HEALTH CARE EDUCATION/TRAINING PROGRAM

## 2025-05-30 PROCEDURE — 83935 ASSAY OF URINE OSMOLALITY: CPT | Mod: GENLAB | Performed by: SURGERY

## 2025-05-30 PROCEDURE — 84300 ASSAY OF URINE SODIUM: CPT | Mod: GENLAB | Performed by: INTERNAL MEDICINE

## 2025-05-30 PROCEDURE — 86885 COOMBS TEST INDIRECT QUAL: CPT | Mod: IPSPLIT

## 2025-05-30 PROCEDURE — 80053 COMPREHEN METABOLIC PANEL: CPT | Performed by: EMERGENCY MEDICINE

## 2025-05-30 PROCEDURE — 87154 CUL TYP ID BLD PTHGN 6+ TRGT: CPT | Mod: GENLAB | Performed by: EMERGENCY MEDICINE

## 2025-05-30 PROCEDURE — 85520 HEPARIN ASSAY: CPT | Mod: IPSPLIT | Performed by: INTERNAL MEDICINE

## 2025-05-30 PROCEDURE — 82570 ASSAY OF URINE CREATININE: CPT | Mod: GENLAB | Performed by: SURGERY

## 2025-05-30 PROCEDURE — 83036 HEMOGLOBIN GLYCOSYLATED A1C: CPT | Mod: GENLAB | Performed by: NURSE PRACTITIONER

## 2025-05-30 PROCEDURE — 85027 COMPLETE CBC AUTOMATED: CPT | Mod: IPSPLIT | Performed by: INTERNAL MEDICINE

## 2025-05-30 PROCEDURE — 96375 TX/PRO/DX INJ NEW DRUG ADDON: CPT | Mod: 59

## 2025-05-30 PROCEDURE — 81001 URINALYSIS AUTO W/SCOPE: CPT | Performed by: EMERGENCY MEDICINE

## 2025-05-30 RX ORDER — HEPARIN SODIUM 10000 [USP'U]/100ML
0-4500 INJECTION, SOLUTION INTRAVENOUS CONTINUOUS
Status: DISCONTINUED | OUTPATIENT
Start: 2025-05-30 | End: 2025-06-01

## 2025-05-30 RX ORDER — LOSARTAN POTASSIUM 50 MG/1
100 TABLET ORAL DAILY
Status: DISCONTINUED | OUTPATIENT
Start: 2025-05-30 | End: 2025-06-09 | Stop reason: HOSPADM

## 2025-05-30 RX ORDER — 3% SODIUM CHLORIDE 3 G/100ML
15 INJECTION, SOLUTION INTRAVENOUS ONCE
Status: COMPLETED | OUTPATIENT
Start: 2025-05-30 | End: 2025-05-30

## 2025-05-30 RX ORDER — SODIUM CHLORIDE 9 MG/ML
125 INJECTION, SOLUTION INTRAVENOUS CONTINUOUS
Status: DISCONTINUED | OUTPATIENT
Start: 2025-05-30 | End: 2025-05-30

## 2025-05-30 RX ORDER — DEXTROSE MONOHYDRATE AND SODIUM CHLORIDE 5; .45 G/100ML; G/100ML
75 INJECTION, SOLUTION INTRAVENOUS CONTINUOUS
Status: DISCONTINUED | OUTPATIENT
Start: 2025-05-30 | End: 2025-05-31

## 2025-05-30 RX ORDER — METOPROLOL SUCCINATE 100 MG/1
100 TABLET, EXTENDED RELEASE ORAL DAILY
Status: DISCONTINUED | OUTPATIENT
Start: 2025-05-30 | End: 2025-06-09 | Stop reason: HOSPADM

## 2025-05-30 RX ORDER — CHOLECALCIFEROL (VITAMIN D3) 25 MCG
25 TABLET ORAL DAILY
COMMUNITY

## 2025-05-30 RX ORDER — ONDANSETRON HYDROCHLORIDE 2 MG/ML
4 INJECTION, SOLUTION INTRAVENOUS ONCE
Status: COMPLETED | OUTPATIENT
Start: 2025-05-30 | End: 2025-05-30

## 2025-05-30 RX ORDER — AMLODIPINE BESYLATE 10 MG/1
10 TABLET ORAL DAILY
Status: DISCONTINUED | OUTPATIENT
Start: 2025-05-30 | End: 2025-06-09 | Stop reason: HOSPADM

## 2025-05-30 RX ORDER — CEFTRIAXONE 1 G/50ML
1 INJECTION, SOLUTION INTRAVENOUS EVERY 24 HOURS
Status: DISCONTINUED | OUTPATIENT
Start: 2025-05-30 | End: 2025-05-31

## 2025-05-30 RX ADMIN — SODIUM CHLORIDE, SODIUM LACTATE, POTASSIUM CHLORIDE, AND CALCIUM CHLORIDE 1000 ML: 600; 310; 30; 20 INJECTION, SOLUTION INTRAVENOUS at 12:16

## 2025-05-30 RX ADMIN — SODIUM CHLORIDE 125 ML/HR: 9 INJECTION, SOLUTION INTRAVENOUS at 18:26

## 2025-05-30 RX ADMIN — CEFTRIAXONE 1 G: 1 INJECTION, SOLUTION INTRAVENOUS at 18:26

## 2025-05-30 RX ADMIN — PIPERACILLIN SODIUM AND TAZOBACTAM SODIUM 3.38 G: 3; .375 INJECTION, SOLUTION INTRAVENOUS at 14:22

## 2025-05-30 RX ADMIN — ONDANSETRON 4 MG: 2 INJECTION INTRAMUSCULAR; INTRAVENOUS at 14:17

## 2025-05-30 RX ADMIN — SODIUM CHLORIDE 15 ML/HR: 3 INJECTION, SOLUTION INTRAVENOUS at 19:15

## 2025-05-30 RX ADMIN — HYDROMORPHONE HYDROCHLORIDE 0.5 MG: 1 INJECTION, SOLUTION INTRAMUSCULAR; INTRAVENOUS; SUBCUTANEOUS at 14:18

## 2025-05-30 RX ADMIN — DEXTROSE MONOHYDRATE AND SODIUM CHLORIDE 75 ML/HR: 5; .45 INJECTION, SOLUTION INTRAVENOUS at 23:33

## 2025-05-30 RX ADMIN — HEPARIN SODIUM 1200 UNITS/HR: 10000 INJECTION, SOLUTION INTRAVENOUS at 18:46

## 2025-05-30 RX ADMIN — SODIUM CHLORIDE 125 ML/HR: 9 INJECTION, SOLUTION INTRAVENOUS at 14:22

## 2025-05-30 SDOH — ECONOMIC STABILITY: FOOD INSECURITY: HOW HARD IS IT FOR YOU TO PAY FOR THE VERY BASICS LIKE FOOD, HOUSING, MEDICAL CARE, AND HEATING?: NOT HARD AT ALL

## 2025-05-30 SDOH — SOCIAL STABILITY: SOCIAL INSECURITY: HAS ANYONE EVER THREATENED TO HURT YOUR FAMILY OR YOUR PETS?: NO

## 2025-05-30 SDOH — SOCIAL STABILITY: SOCIAL INSECURITY
WITHIN THE LAST YEAR, HAVE YOU BEEN KICKED, HIT, SLAPPED, OR OTHERWISE PHYSICALLY HURT BY YOUR PARTNER OR EX-PARTNER?: NO

## 2025-05-30 SDOH — ECONOMIC STABILITY: HOUSING INSECURITY: IN THE PAST 12 MONTHS, HOW MANY TIMES HAVE YOU MOVED WHERE YOU WERE LIVING?: 0

## 2025-05-30 SDOH — ECONOMIC STABILITY: HOUSING INSECURITY: IN THE LAST 12 MONTHS, WAS THERE A TIME WHEN YOU WERE NOT ABLE TO PAY THE MORTGAGE OR RENT ON TIME?: NO

## 2025-05-30 SDOH — HEALTH STABILITY: PHYSICAL HEALTH
HOW OFTEN DO YOU NEED TO HAVE SOMEONE HELP YOU WHEN YOU READ INSTRUCTIONS, PAMPHLETS, OR OTHER WRITTEN MATERIAL FROM YOUR DOCTOR OR PHARMACY?: RARELY

## 2025-05-30 SDOH — SOCIAL STABILITY: SOCIAL NETWORK: HOW OFTEN DO YOU ATTEND MEETINGS OF THE CLUBS OR ORGANIZATIONS YOU BELONG TO?: NEVER

## 2025-05-30 SDOH — SOCIAL STABILITY: SOCIAL INSECURITY: WITHIN THE LAST YEAR, HAVE YOU BEEN AFRAID OF YOUR PARTNER OR EX-PARTNER?: NO

## 2025-05-30 SDOH — SOCIAL STABILITY: SOCIAL INSECURITY: WITHIN THE LAST YEAR, HAVE YOU BEEN HUMILIATED OR EMOTIONALLY ABUSED IN OTHER WAYS BY YOUR PARTNER OR EX-PARTNER?: NO

## 2025-05-30 SDOH — ECONOMIC STABILITY: FOOD INSECURITY: WITHIN THE PAST 12 MONTHS, THE FOOD YOU BOUGHT JUST DIDN'T LAST AND YOU DIDN'T HAVE MONEY TO GET MORE.: NEVER TRUE

## 2025-05-30 SDOH — ECONOMIC STABILITY: HOUSING INSECURITY: AT ANY TIME IN THE PAST 12 MONTHS, WERE YOU HOMELESS OR LIVING IN A SHELTER (INCLUDING NOW)?: NO

## 2025-05-30 SDOH — HEALTH STABILITY: PHYSICAL HEALTH: ON AVERAGE, HOW MANY MINUTES DO YOU ENGAGE IN EXERCISE AT THIS LEVEL?: 60 MIN

## 2025-05-30 SDOH — SOCIAL STABILITY: SOCIAL NETWORK: HOW OFTEN DO YOU ATTEND CHURCH OR RELIGIOUS SERVICES?: NEVER

## 2025-05-30 SDOH — SOCIAL STABILITY: SOCIAL INSECURITY
WITHIN THE LAST YEAR, HAVE YOU BEEN RAPED OR FORCED TO HAVE ANY KIND OF SEXUAL ACTIVITY BY YOUR PARTNER OR EX-PARTNER?: NO

## 2025-05-30 SDOH — ECONOMIC STABILITY: FOOD INSECURITY: WITHIN THE PAST 12 MONTHS, YOU WORRIED THAT YOUR FOOD WOULD RUN OUT BEFORE YOU GOT THE MONEY TO BUY MORE.: NEVER TRUE

## 2025-05-30 SDOH — SOCIAL STABILITY: SOCIAL INSECURITY: ARE YOU MARRIED, WIDOWED, DIVORCED, SEPARATED, NEVER MARRIED, OR LIVING WITH A PARTNER?: WIDOWED

## 2025-05-30 SDOH — HEALTH STABILITY: MENTAL HEALTH
DO YOU FEEL STRESS - TENSE, RESTLESS, NERVOUS, OR ANXIOUS, OR UNABLE TO SLEEP AT NIGHT BECAUSE YOUR MIND IS TROUBLED ALL THE TIME - THESE DAYS?: ONLY A LITTLE

## 2025-05-30 SDOH — HEALTH STABILITY: PHYSICAL HEALTH: ON AVERAGE, HOW MANY DAYS PER WEEK DO YOU ENGAGE IN MODERATE TO STRENUOUS EXERCISE (LIKE A BRISK WALK)?: 7 DAYS

## 2025-05-30 SDOH — SOCIAL STABILITY: SOCIAL INSECURITY: ARE THERE ANY APPARENT SIGNS OF INJURIES/BEHAVIORS THAT COULD BE RELATED TO ABUSE/NEGLECT?: NO

## 2025-05-30 SDOH — SOCIAL STABILITY: SOCIAL NETWORK
IN A TYPICAL WEEK, HOW MANY TIMES DO YOU TALK ON THE PHONE WITH FAMILY, FRIENDS, OR NEIGHBORS?: MORE THAN THREE TIMES A WEEK

## 2025-05-30 SDOH — ECONOMIC STABILITY: INCOME INSECURITY: IN THE PAST 12 MONTHS HAS THE ELECTRIC, GAS, OIL, OR WATER COMPANY THREATENED TO SHUT OFF SERVICES IN YOUR HOME?: NO

## 2025-05-30 SDOH — SOCIAL STABILITY: SOCIAL NETWORK: HOW OFTEN DO YOU GET TOGETHER WITH FRIENDS OR RELATIVES?: MORE THAN THREE TIMES A WEEK

## 2025-05-30 SDOH — SOCIAL STABILITY: SOCIAL NETWORK
DO YOU BELONG TO ANY CLUBS OR ORGANIZATIONS SUCH AS CHURCH GROUPS, UNIONS, FRATERNAL OR ATHLETIC GROUPS, OR SCHOOL GROUPS?: NO

## 2025-05-30 SDOH — SOCIAL STABILITY: SOCIAL INSECURITY: ABUSE: ADULT

## 2025-05-30 SDOH — SOCIAL STABILITY: SOCIAL INSECURITY: WERE YOU ABLE TO COMPLETE ALL THE BEHAVIORAL HEALTH SCREENINGS?: YES

## 2025-05-30 SDOH — SOCIAL STABILITY: SOCIAL INSECURITY: DO YOU FEEL ANYONE HAS EXPLOITED OR TAKEN ADVANTAGE OF YOU FINANCIALLY OR OF YOUR PERSONAL PROPERTY?: NO

## 2025-05-30 SDOH — ECONOMIC STABILITY: TRANSPORTATION INSECURITY: IN THE PAST 12 MONTHS, HAS LACK OF TRANSPORTATION KEPT YOU FROM MEDICAL APPOINTMENTS OR FROM GETTING MEDICATIONS?: NO

## 2025-05-30 SDOH — SOCIAL STABILITY: SOCIAL INSECURITY: DO YOU FEEL UNSAFE GOING BACK TO THE PLACE WHERE YOU ARE LIVING?: NO

## 2025-05-30 SDOH — SOCIAL STABILITY: SOCIAL INSECURITY: DOES ANYONE TRY TO KEEP YOU FROM HAVING/CONTACTING OTHER FRIENDS OR DOING THINGS OUTSIDE YOUR HOME?: NO

## 2025-05-30 SDOH — SOCIAL STABILITY: SOCIAL INSECURITY: HAVE YOU HAD ANY THOUGHTS OF HARMING ANYONE ELSE?: NO

## 2025-05-30 SDOH — SOCIAL STABILITY: SOCIAL INSECURITY: ARE YOU OR HAVE YOU BEEN THREATENED OR ABUSED PHYSICALLY, EMOTIONALLY, OR SEXUALLY BY ANYONE?: NO

## 2025-05-30 SDOH — SOCIAL STABILITY: SOCIAL INSECURITY: HAVE YOU HAD THOUGHTS OF HARMING ANYONE ELSE?: NO

## 2025-05-30 ASSESSMENT — COGNITIVE AND FUNCTIONAL STATUS - GENERAL
DAILY ACTIVITIY SCORE: 24
MOBILITY SCORE: 24
PATIENT BASELINE BEDBOUND: NO

## 2025-05-30 ASSESSMENT — ACTIVITIES OF DAILY LIVING (ADL)
TOILETING: INDEPENDENT
DRESSING YOURSELF: INDEPENDENT
BATHING: INDEPENDENT
LACK_OF_TRANSPORTATION: NO
LACK_OF_TRANSPORTATION: NO
WALKS IN HOME: INDEPENDENT
GROOMING: INDEPENDENT
HEARING - LEFT EAR: FUNCTIONAL
ADEQUATE_TO_COMPLETE_ADL: NO
HEARING - RIGHT EAR: FUNCTIONAL
FEEDING YOURSELF: INDEPENDENT
PATIENT'S MEMORY ADEQUATE TO SAFELY COMPLETE DAILY ACTIVITIES?: YES
JUDGMENT_ADEQUATE_SAFELY_COMPLETE_DAILY_ACTIVITIES: YES

## 2025-05-30 ASSESSMENT — PAIN - FUNCTIONAL ASSESSMENT
PAIN_FUNCTIONAL_ASSESSMENT: 0-10

## 2025-05-30 ASSESSMENT — PAIN SCALES - GENERAL
PAINLEVEL_OUTOF10: 0 - NO PAIN
PAINLEVEL_OUTOF10: 7
PAINLEVEL_OUTOF10: 0 - NO PAIN
PAINLEVEL_OUTOF10: 0 - NO PAIN

## 2025-05-30 ASSESSMENT — PATIENT HEALTH QUESTIONNAIRE - PHQ9
1. LITTLE INTEREST OR PLEASURE IN DOING THINGS: NOT AT ALL
2. FEELING DOWN, DEPRESSED OR HOPELESS: NOT AT ALL
SUM OF ALL RESPONSES TO PHQ9 QUESTIONS 1 & 2: 0

## 2025-05-30 ASSESSMENT — COLUMBIA-SUICIDE SEVERITY RATING SCALE - C-SSRS
1. IN THE PAST MONTH, HAVE YOU WISHED YOU WERE DEAD OR WISHED YOU COULD GO TO SLEEP AND NOT WAKE UP?: NO
6. HAVE YOU EVER DONE ANYTHING, STARTED TO DO ANYTHING, OR PREPARED TO DO ANYTHING TO END YOUR LIFE?: NO
2. HAVE YOU ACTUALLY HAD ANY THOUGHTS OF KILLING YOURSELF?: NO

## 2025-05-30 ASSESSMENT — LIFESTYLE VARIABLES
AUDIT-C TOTAL SCORE: 0
SUBSTANCE_ABUSE_PAST_12_MONTHS: NO
PRESCIPTION_ABUSE_PAST_12_MONTHS: NO
AUDIT-C TOTAL SCORE: 0
HOW MANY STANDARD DRINKS CONTAINING ALCOHOL DO YOU HAVE ON A TYPICAL DAY: PATIENT DOES NOT DRINK
HOW OFTEN DO YOU HAVE 6 OR MORE DRINKS ON ONE OCCASION: NEVER
HOW OFTEN DO YOU HAVE A DRINK CONTAINING ALCOHOL: NEVER
SKIP TO QUESTIONS 9-10: 1

## 2025-05-30 NOTE — ED PROVIDER NOTES
HPI   Chief Complaint   Patient presents with    Weakness, Gen     Patient reports eating something last Friday and initially thought she had food poisoning. Patient was experiencing nausea, vomiting, and diarrhea. Now just experiencing generalized weakness, feeling run down, and having little to no appetite.        HPI        Patient History   Medical History[1]  Surgical History[2]  Family History[3]  Social History[4]    Physical Exam   ED Triage Vitals   Temperature Heart Rate Respirations BP   05/30/25 1036 05/30/25 1036 05/30/25 1036 05/30/25 1036   36.6 °C (97.9 °F) 97 18 (!) 140/99      Pulse Ox Temp Source Heart Rate Source Patient Position   05/30/25 1030 05/30/25 1036 05/30/25 1036 05/30/25 1036   96 % Temporal Monitor Lying      BP Location FiO2 (%)     05/30/25 1036 --     Left arm        Physical Exam  Constitutional:       General: She is not in acute distress.     Appearance: Normal appearance. She is not toxic-appearing.   HENT:      Head: Normocephalic and atraumatic.      Right Ear: Tympanic membrane normal.      Left Ear: Tympanic membrane normal.      Mouth/Throat:      Mouth: Mucous membranes are moist.      Pharynx: Oropharynx is clear.   Eyes:      Conjunctiva/sclera: Conjunctivae normal.      Pupils: Pupils are equal, round, and reactive to light.   Cardiovascular:      Rate and Rhythm: Normal rate and regular rhythm.      Pulses: Normal pulses.      Heart sounds: Normal heart sounds.   Pulmonary:      Effort: Pulmonary effort is normal. No respiratory distress.      Breath sounds: Normal breath sounds. No wheezing.   Abdominal:      General: Bowel sounds are normal.      Palpations: Abdomen is soft.      Tenderness: There is no abdominal tenderness. There is no guarding or rebound.   Musculoskeletal:         General: Normal range of motion.      Cervical back: Normal range of motion.   Skin:     General: Skin is warm and dry.   Neurological:      General: No focal deficit present.       Mental Status: She is alert and oriented to person, place, and time.           ED Course & MDM   ED Course as of 05/30/25 1624   Fri May 30, 2025   1400 At this point recognized that the patient had infectious etiology started the patient on Zosyn [KA]   1414 FYI CT: The appendix is not visualized, although right lower quadrant inflammation and free fluid centered around the cecum raise concern for possible acute appendicitis. No organized fluid collection. Please correlate with right lower quadrant tenderness.  Multiple fluid-filled small bowel within the pelvis could represent nonspecific enteritis. No bowel obstruction.  Small left pleural effusion without definite evidence of pneumonia.  4 cm ascending thoracic aortic aneurysm.   10 mins  CJ  Leon Garner MD was added by you.  9 mins  Thank you   1  9 mins  BURT Garner MD  admit to medicine - NPO, IV Abx - follow clinically for now , can you consult me   3 mins  ok  3 mins  Shanel Valencia, DO was removed by you.  3 mins  Jung Esparza, DO was added by you.  2 mins  (FYI Appy when she was in her 20's)  2 mins  BURT Garner MD  Thx - dont see appendix on CT so that explains it   Has significant electrolyte abnormalities Hyponatremia , elevated CR so need rehydrated with correction of Na   Now  Jung. . . . . Na of 109  AG  She has not been eating and drinking secondary to abdominal pain for least a week   [KA]   1525 EKG performed at 1111 showing atrial fibrillation at a ventricular rate of 95 with no ST elevation or depression no indication for STEMI this time interpreted by me.  Patient does not have a history of A-fib. [KA]      ED Course User Index  [KA] Dawit More,          Diagnoses as of 05/30/25 1624   Generalized weakness   Generalized abdominal pain   Nausea and vomiting, unspecified vomiting type   Urinary retention   Hyponatremia   New onset atrial fibrillation (Multi)                 No data recorded     Ramiro Coma Scale  Score: 15 (05/30/25 1050 : Candis Angel)                     SEP-1 CORE MEASURE DATA      I suspected infection with no organ dysfunction on see ED course    Visit Vitals  /77   Pulse 92   Temp 36.5 °C (97.7 °F)   Resp (!) 22   SpO2 95%        Lab Results   Component Value Date/Time    WBC 19.8 (H) 05/30/2025 1119    Lactate 1.0 05/30/2025 1215    Creatinine 2.68 (H) 05/30/2025 1119    Bilirubin, Total 1.0 05/30/2025 1119    Platelets 186 05/30/2025 1119    Glucose 112 (H) 05/30/2025 1119        A targeted fluid bolus of less than 30ml/kg actual body weight, due to Severe Electrolyte disturbance.  Targeted fluid bolus of 1000mL was given.    Suspected infection source   GI    Patient recently received an antibiotic (last 24 hours)       Date/Time Action Medication Dose    05/30/25 1422 New Bag    piperacillin-tazobactam (Zosyn) 3.375 g in dextrose (iso) IV 50 mL 3.375 g            Medical decision making/complexity        I performed a sepsis reperfusion exam on Mireya Nava on 05/30/25 at.       Medical Decision Making      Procedure  Procedures       [1]   Past Medical History:  Diagnosis Date    Asymptomatic menopausal state 06/03/2022    Encounter for screening mammogram for malignant neoplasm of breast 01/12/2021    Seasonal allergies 08/19/2019   [2] History reviewed. No pertinent surgical history.  [3] No family history on file.  [4]   Social History  Tobacco Use    Smoking status: Never    Smokeless tobacco: Never   Substance Use Topics    Alcohol use: Not Currently     Comment: rare    Drug use: Never        Dawit More,   05/30/25 1629

## 2025-05-30 NOTE — H&P
History Of Present Illness  Mireya Nava is a 74 y.o. female with past medical history of hypertension, anemia, detached retina status post repair, who came to the hospital secondary to nausea, vomiting, decreased appetite.  Patient reports 7 days ago, she ate a sandwich with sausage, spinach and since then, she has had nausea and vomiting, decreased appetite.  She reports her last bowel movement was a small amount 1 to 2 days ago.  She reports having chills at times.  Patient reports decreased appetite and has not eaten much over the past 1 week.  She reports having some low back pain across her low back.  She states she has not been urinating much.  She denies chest pain, shortness of breath, blood in her urine, black or bloody stools, lower extremity edema.  She reports having a colonoscopy last summer.  She reports having an appendectomy in her 20s.  Patient denies recent sick contacts, camping trips or any trips out of the country.  Patient reports generalized weakness where she states her legs feel heavy.  She denies history of atrial fibrillation and denies having palpitations.  Otherwise, 10 point review of systems is benign.     Past Medical History  Medical History[1]    Surgical History  Surgical History[2]     Social History  She reports that she has never smoked. She has never used smokeless tobacco. She reports that she does not currently use alcohol. She reports that she does not use drugs.    Family History  Family History[3]     Allergies  Patient has no known allergies.    Review of Systems  -As stated in the HPI.  Otherwise, 10 point review of systems is benign.    Physical Exam  General: Alert and oriented to self, birthday, president and year.  No acute distress.  HEENT: Sclera clear.  CVS: Irregularly irregular and mildly tachycardic.  Lungs: CTAB.  Abdomen: Soft.  No significant abdominal tenderness to palpation. Bowel sounds present.  Extremities: No pitting edema bilateral  "ankles.  Psychiatric: Cooperative.  Neurologic: Strength is 5/5 bilateral upper extremities.  Strength is 4+/5 bilateral lower extremities.    Last Recorded Vitals  Blood pressure 110/65, pulse 87, temperature 36.5 °C (97.7 °F), resp. rate 16, height 1.626 m (5' 4\"), weight 65.8 kg (145 lb), SpO2 96%.    Relevant Results        Results for orders placed or performed during the hospital encounter of 05/30/25 (from the past 24 hours)   CBC and Auto Differential   Result Value Ref Range    WBC 19.8 (H) 4.4 - 11.3 x10*3/uL    nRBC 0.0 0.0 - 0.0 /100 WBCs    RBC 3.51 (L) 4.00 - 5.20 x10*6/uL    Hemoglobin 10.8 (L) 12.0 - 16.0 g/dL    Hematocrit 28.8 (L) 36.0 - 46.0 %    MCV 82 80 - 100 fL    MCH 30.8 26.0 - 34.0 pg    MCHC 37.5 (H) 32.0 - 36.0 g/dL    RDW 13.8 11.5 - 14.5 %    Platelets 186 150 - 450 x10*3/uL    Neutrophils % 93.2 40.0 - 80.0 %    Immature Granulocytes %, Automated 2.1 (H) 0.0 - 0.9 %    Lymphocytes % 1.1 13.0 - 44.0 %    Monocytes % 3.1 2.0 - 10.0 %    Eosinophils % 0.1 0.0 - 6.0 %    Basophils % 0.4 0.0 - 2.0 %    Neutrophils Absolute 18.50 (H) 1.60 - 5.50 x10*3/uL    Immature Granulocytes Absolute, Automated 0.42 0.00 - 0.50 x10*3/uL    Lymphocytes Absolute 0.21 (L) 0.80 - 3.00 x10*3/uL    Monocytes Absolute 0.61 0.05 - 0.80 x10*3/uL    Eosinophils Absolute 0.01 0.00 - 0.40 x10*3/uL    Basophils Absolute 0.07 0.00 - 0.10 x10*3/uL   Comprehensive metabolic panel   Result Value Ref Range    Glucose 112 (H) 74 - 99 mg/dL    Sodium 109 (LL) 136 - 145 mmol/L    Potassium 3.9 3.5 - 5.3 mmol/L    Chloride 76 (L) 98 - 107 mmol/L    Bicarbonate 20 (L) 21 - 32 mmol/L    Anion Gap 17 10 - 20 mmol/L    Urea Nitrogen 52 (H) 6 - 23 mg/dL    Creatinine 2.68 (H) 0.50 - 1.05 mg/dL    eGFR 18 (L) >60 mL/min/1.73m*2    Calcium 8.5 (L) 8.6 - 10.3 mg/dL    Albumin 3.3 (L) 3.4 - 5.0 g/dL    Alkaline Phosphatase 178 (H) 33 - 136 U/L    Total Protein 6.9 6.4 - 8.2 g/dL    AST 28 9 - 39 U/L    Bilirubin, Total 1.0 0.0 - 1.2 " mg/dL    ALT 33 7 - 45 U/L   Lactate   Result Value Ref Range    Lactate 1.0 0.4 - 2.0 mmol/L   Sars-CoV-2 and Influenza A/B PCR   Result Value Ref Range    Flu A Result Not Detected Not Detected    Flu B Result Not Detected Not Detected    Coronavirus 2019, PCR Not Detected Not Detected   Urinalysis with Reflex Culture and Microscopic   Result Value Ref Range    Color, Urine Light-Orange (N) Light-Yellow, Yellow, Dark-Yellow    Appearance, Urine Turbid (N) Clear    Specific Gravity, Urine 1.009 1.005 - 1.035    pH, Urine 6.0 5.0, 5.5, 6.0, 6.5, 7.0, 7.5, 8.0    Protein, Urine 100 (2+) (A) NEGATIVE, 10 (TRACE), 20 (TRACE) mg/dL    Glucose, Urine Normal Normal mg/dL    Blood, Urine 0.5 (2+) (A) NEGATIVE mg/dL    Ketones, Urine NEGATIVE NEGATIVE mg/dL    Bilirubin, Urine NEGATIVE NEGATIVE mg/dL    Urobilinogen, Urine Normal Normal mg/dL    Nitrite, Urine NEGATIVE NEGATIVE    Leukocyte Esterase, Urine 500 Nina/uL (A) NEGATIVE   Microscopic Only, Urine   Result Value Ref Range    WBC, Urine >50 (A) 1-5, NONE /HPF    RBC, Urine 11-20 (A) NONE, 1-2, 3-5 /HPF    Squamous Epithelial Cells, Urine 1-9 (SPARSE) Reference range not established. /HPF    Transitional Epithelial Cells, Urine 1-2 (FEW) Reference range not established. /HPF    Bacteria, Urine 4+ (A) NONE SEEN /HPF    Mucus, Urine FEW Reference range not established. /LPF    Hyaline Casts, Urine 1+ (A) NONE /LPF    Fine Granular Casts, Urine 1+ (A) NONE /LPF   Basic Metabolic Panel   Result Value Ref Range    Glucose 103 (H) 74 - 99 mg/dL    Sodium 109 (LL) 136 - 145 mmol/L    Potassium 3.5 3.5 - 5.3 mmol/L    Chloride 79 (L) 98 - 107 mmol/L    Bicarbonate 19 (L) 21 - 32 mmol/L    Anion Gap 15 10 - 20 mmol/L    Urea Nitrogen 51 (H) 6 - 23 mg/dL    Creatinine 2.68 (H) 0.50 - 1.05 mg/dL    eGFR 18 (L) >60 mL/min/1.73m*2    Calcium 7.5 (L) 8.6 - 10.3 mg/dL        CT chest abdomen pelvis wo IV contrast  Result Date: 5/30/2025    Limited examination without intravenous  contrast.   The appendix is not visualized, although right lower quadrant inflammation and free fluid centered around the cecum raise concern for possible acute appendicitis. No organized fluid collection. Please correlate with right lower quadrant tenderness.   Multiple fluid-filled small bowel within the pelvis could represent nonspecific enteritis. No bowel obstruction.   Small left pleural effusion without definite evidence of pneumonia.   4 cm ascending thoracic aortic aneurysm.   MACRO Shanel Valencia discussed the significance and urgency of this critical finding by Epic secure chat with  DARA WELCH on 5/30/2025 at 2:03 pm.  (**-RCF-**) Findings:  See findings.   Signed by: Shanel Valencia 5/30/2025 2:05 PM Dictation workstation:   [id number]        Assessment & Plan    Mireya Nava is a 74 y.o. female with past medical history of hypertension, anemia, detached retina status post repair, who came to the hospital secondary to nausea, vomiting, decreased appetite who is being admitted to the hospital with severe hyponatremia, nonspecific enteritis, acute kidney injury, urinary tract infection, urinary retention, new onset atrial fibrillation.    Severe hyponatremia  - Likely secondary to dehydration based on her history.  - Patient is not on any obvious home medications to cause hyponatremia.  - Sodium was 130 on 5/30/2024.  - Will admit to the ICU.  - Given IV fluids with normal saline in the ER of 1 L and repeat sodium is unchanged at 109.  - Patient is alert and oriented to self, birthday, president and year and appears mentating well.  She does have some generalized weakness but no obvious gross focal neurologic deficits.  - Will check urine and serum osmolality, urine sodium, TSH, a.m. cortisol level.  - Will place on 3% hypertonic saline at 15 mL/h for 4 hours and check BMPs every 4 hours.  - Goal to achieve a 24-hour increase in serum sodium of 4 to 6 mEq/L.   - Placed on seizure precautions.  -  Consult critical care.  - Monitor.    - Patient's daughter and son were at the bedside and all of their questions were answered.    Acute kidney injury  - Likely secondary to dehydration with decreased oral intake.  - Check urine creatinine, urine sodium to calculate FeNa.   - Patient given 1 L of IV fluids in the ER.  As above, will place on hypertonic saline at 15 mL/h for 4 hours and check BMPs every 4 hours.  - CT scan of the abdomen pelvis with no signs of hydronephrosis, but patient apparently had some urinary retention, Campbell catheter was placed.  -Hold home medication of losartan with acute kidney injury.  - Monitor.    Nonspecific enteritis  -Dr. Sanderson, general surgeon, was contacted by the ER provider for a consult.  - Will place on clinical diet at this time and monitor.    New onset atrial fibrillation  - Patient is on metoprolol  mg at home, which we will continue.  - Place on heparin drip.  - Check echo.  - Consult cardiology.    UTI  - Place on Rocephin and follow urine culture.    Urinary retention  - Campbell catheter placed in the ER.  Monitor.    Hypertension  - Hold home medication of losartan with acute kidney injury.  - Continue home medications of metoprolol  mg daily and will hold amlodipine 10 mg daily his BP on the low normal side.      4 cm ascending thoracic aortic aneurysm found incidentally on CT scan  - Recommend follow-up with primary care provider and cardiothoracic surgery as an outpatient.    DVT prophylaxis  - Heparin drip.    Jung Esparza DO         [1]   Past Medical History:  Diagnosis Date    Asymptomatic menopausal state 06/03/2022    Encounter for screening mammogram for malignant neoplasm of breast 01/12/2021    Seasonal allergies 08/19/2019   [2] History reviewed. No pertinent surgical history.  [3] No family history on file.

## 2025-05-31 PROBLEM — E87.1 HYPO-OSMOLAR HYPONATREMIA: Status: ACTIVE | Noted: 2025-05-31

## 2025-05-31 LAB
ALBUMIN SERPL BCP-MCNC: 2.2 G/DL (ref 3.4–5)
ANION GAP SERPL CALC-SCNC: 12 MMOL/L (ref 10–20)
ANION GAP SERPL CALC-SCNC: 13 MMOL/L (ref 10–20)
ANION GAP SERPL CALC-SCNC: 15 MMOL/L (ref 10–20)
ANION GAP SERPL CALC-SCNC: 15 MMOL/L (ref 10–20)
BUN SERPL-MCNC: 50 MG/DL (ref 6–23)
BUN SERPL-MCNC: 51 MG/DL (ref 6–23)
BUN SERPL-MCNC: 52 MG/DL (ref 6–23)
BUN SERPL-MCNC: 54 MG/DL (ref 6–23)
CALCIUM SERPL-MCNC: 6.6 MG/DL (ref 8.6–10.3)
CALCIUM SERPL-MCNC: 6.8 MG/DL (ref 8.6–10.3)
CALCIUM SERPL-MCNC: 6.9 MG/DL (ref 8.6–10.3)
CALCIUM SERPL-MCNC: 6.9 MG/DL (ref 8.6–10.3)
CALCIUM SERPL-MCNC: 7.1 MG/DL (ref 8.6–10.3)
CALCIUM SERPL-MCNC: 7.4 MG/DL (ref 8.6–10.3)
CHLORIDE SERPL-SCNC: 81 MMOL/L (ref 98–107)
CHLORIDE SERPL-SCNC: 83 MMOL/L (ref 98–107)
CHLORIDE SERPL-SCNC: 84 MMOL/L (ref 98–107)
CHLORIDE SERPL-SCNC: 85 MMOL/L (ref 98–107)
CHLORIDE SERPL-SCNC: 85 MMOL/L (ref 98–107)
CHLORIDE SERPL-SCNC: 86 MMOL/L (ref 98–107)
CO2 SERPL-SCNC: 17 MMOL/L (ref 21–32)
CO2 SERPL-SCNC: 17 MMOL/L (ref 21–32)
CO2 SERPL-SCNC: 18 MMOL/L (ref 21–32)
CO2 SERPL-SCNC: 19 MMOL/L (ref 21–32)
CORTIS AM PEAK SERPL-MSCNC: 28.5 UG/DL (ref 5–20)
CREAT SERPL-MCNC: 2.78 MG/DL (ref 0.5–1.05)
CREAT SERPL-MCNC: 2.81 MG/DL (ref 0.5–1.05)
CREAT SERPL-MCNC: 2.81 MG/DL (ref 0.5–1.05)
CREAT SERPL-MCNC: 2.88 MG/DL (ref 0.5–1.05)
CREAT SERPL-MCNC: 2.98 MG/DL (ref 0.5–1.05)
CREAT SERPL-MCNC: 3.19 MG/DL (ref 0.5–1.05)
CREAT UR-MCNC: 34.8 MG/DL (ref 20–320)
CREAT UR-MCNC: 71.1 MG/DL (ref 20–320)
E COLI DNA BLD POS QL NAA+PROBE: DETECTED
EGFRCR SERPLBLD CKD-EPI 2021: 15 ML/MIN/1.73M*2
EGFRCR SERPLBLD CKD-EPI 2021: 16 ML/MIN/1.73M*2
EGFRCR SERPLBLD CKD-EPI 2021: 16 ML/MIN/1.73M*2
EGFRCR SERPLBLD CKD-EPI 2021: 17 ML/MIN/1.73M*2
ERYTHROCYTE [DISTWIDTH] IN BLOOD BY AUTOMATED COUNT: 13.8 % (ref 11.5–14.5)
GLUCOSE BLD MANUAL STRIP-MCNC: 128 MG/DL (ref 74–99)
GLUCOSE BLD MANUAL STRIP-MCNC: 130 MG/DL (ref 74–99)
GLUCOSE BLD MANUAL STRIP-MCNC: 83 MG/DL (ref 74–99)
GLUCOSE SERPL-MCNC: 101 MG/DL (ref 74–99)
GLUCOSE SERPL-MCNC: 114 MG/DL (ref 74–99)
GLUCOSE SERPL-MCNC: 118 MG/DL (ref 74–99)
GLUCOSE SERPL-MCNC: 127 MG/DL (ref 74–99)
GLUCOSE SERPL-MCNC: 127 MG/DL (ref 74–99)
GLUCOSE SERPL-MCNC: 360 MG/DL (ref 74–99)
HCT VFR BLD AUTO: 21.9 % (ref 36–46)
HGB BLD-MCNC: 8.2 G/DL (ref 12–16)
MAGNESIUM SERPL-MCNC: 1.75 MG/DL (ref 1.6–2.4)
MCH RBC QN AUTO: 30.7 PG (ref 26–34)
MCHC RBC AUTO-ENTMCNC: 37.4 G/DL (ref 32–36)
MCV RBC AUTO: 82 FL (ref 80–100)
NRBC BLD-RTO: 0 /100 WBCS (ref 0–0)
OSMOLALITY SERPL: 251 MOSM/KG (ref 280–300)
OSMOLALITY UR: 176 MOSM/KG (ref 200–1200)
OSMOLALITY UR: 209 MOSM/KG (ref 200–1200)
PHOSPHATE SERPL-MCNC: 2.5 MG/DL (ref 2.5–4.9)
PLATELET # BLD AUTO: 151 X10*3/UL (ref 150–450)
POTASSIUM SERPL-SCNC: 3.3 MMOL/L (ref 3.5–5.3)
POTASSIUM SERPL-SCNC: 3.6 MMOL/L (ref 3.5–5.3)
POTASSIUM SERPL-SCNC: 3.6 MMOL/L (ref 3.5–5.3)
POTASSIUM SERPL-SCNC: 3.7 MMOL/L (ref 3.5–5.3)
POTASSIUM SERPL-SCNC: 3.9 MMOL/L (ref 3.5–5.3)
POTASSIUM SERPL-SCNC: 4 MMOL/L (ref 3.5–5.3)
RBC # BLD AUTO: 2.67 X10*6/UL (ref 4–5.2)
SODIUM SERPL-SCNC: 109 MMOL/L (ref 136–145)
SODIUM SERPL-SCNC: 110 MMOL/L (ref 136–145)
SODIUM SERPL-SCNC: 112 MMOL/L (ref 136–145)
SODIUM SERPL-SCNC: 114 MMOL/L (ref 136–145)
SODIUM UR-SCNC: 22 MMOL/L
SODIUM UR-SCNC: 23 MMOL/L
SODIUM/CREAT UR-RTO: 31 MMOL/G CREAT
SODIUM/CREAT UR-RTO: 66 MMOL/G CREAT
TSH SERPL-ACNC: 0.58 MIU/L (ref 0.44–3.98)
UFH PPP CHRO-ACNC: 0.1 IU/ML (ref ?–1.1)
UFH PPP CHRO-ACNC: 0.2 IU/ML (ref ?–1.1)
UFH PPP CHRO-ACNC: 0.2 IU/ML (ref ?–1.1)
UFH PPP CHRO-ACNC: 0.3 IU/ML (ref ?–1.1)
UFH PPP CHRO-ACNC: 0.8 IU/ML (ref ?–1.1)
WBC # BLD AUTO: 19.3 X10*3/UL (ref 4.4–11.3)

## 2025-05-31 PROCEDURE — 85520 HEPARIN ASSAY: CPT | Mod: IPSPLIT | Performed by: INTERNAL MEDICINE

## 2025-05-31 PROCEDURE — 36415 COLL VENOUS BLD VENIPUNCTURE: CPT | Mod: IPSPLIT | Performed by: INTERNAL MEDICINE

## 2025-05-31 PROCEDURE — 80048 BASIC METABOLIC PNL TOTAL CA: CPT | Mod: CCI,IPSPLIT | Performed by: INTERNAL MEDICINE

## 2025-05-31 PROCEDURE — 82947 ASSAY GLUCOSE BLOOD QUANT: CPT | Mod: IPSPLIT

## 2025-05-31 PROCEDURE — 2500000004 HC RX 250 GENERAL PHARMACY W/ HCPCS (ALT 636 FOR OP/ED): Mod: IPSPLIT | Performed by: SURGERY

## 2025-05-31 PROCEDURE — 84443 ASSAY THYROID STIM HORMONE: CPT | Mod: IPSPLIT | Performed by: INTERNAL MEDICINE

## 2025-05-31 PROCEDURE — 2020000001 HC ICU ROOM DAILY: Mod: IPSPLIT

## 2025-05-31 PROCEDURE — 2500000001 HC RX 250 WO HCPCS SELF ADMINISTERED DRUGS (ALT 637 FOR MEDICARE OP): Mod: IPSPLIT | Performed by: NURSE PRACTITIONER

## 2025-05-31 PROCEDURE — 85520 HEPARIN ASSAY: CPT | Mod: IPSPLIT | Performed by: NURSE PRACTITIONER

## 2025-05-31 PROCEDURE — 83735 ASSAY OF MAGNESIUM: CPT | Mod: IPSPLIT | Performed by: INTERNAL MEDICINE

## 2025-05-31 PROCEDURE — 2580000001 HC RX 258 IV SOLUTIONS: Mod: IPSPLIT | Performed by: NURSE PRACTITIONER

## 2025-05-31 PROCEDURE — 2500000004 HC RX 250 GENERAL PHARMACY W/ HCPCS (ALT 636 FOR OP/ED): Mod: JZ,IPSPLIT | Performed by: INTERNAL MEDICINE

## 2025-05-31 PROCEDURE — 80048 BASIC METABOLIC PNL TOTAL CA: CPT | Mod: IPSPLIT | Performed by: SURGERY

## 2025-05-31 PROCEDURE — 85027 COMPLETE CBC AUTOMATED: CPT | Mod: IPSPLIT | Performed by: INTERNAL MEDICINE

## 2025-05-31 PROCEDURE — 2500000001 HC RX 250 WO HCPCS SELF ADMINISTERED DRUGS (ALT 637 FOR MEDICARE OP): Mod: IPSPLIT | Performed by: SURGERY

## 2025-05-31 PROCEDURE — 99223 1ST HOSP IP/OBS HIGH 75: CPT | Performed by: SURGERY

## 2025-05-31 PROCEDURE — 99232 SBSQ HOSP IP/OBS MODERATE 35: CPT | Performed by: NURSE PRACTITIONER

## 2025-05-31 PROCEDURE — 80048 BASIC METABOLIC PNL TOTAL CA: CPT | Mod: CCI,IPSPLIT | Performed by: NURSE PRACTITIONER

## 2025-05-31 PROCEDURE — 94760 N-INVAS EAR/PLS OXIMETRY 1: CPT | Mod: IPSPLIT

## 2025-05-31 PROCEDURE — 80069 RENAL FUNCTION PANEL: CPT | Mod: IPSPLIT | Performed by: SURGERY

## 2025-05-31 PROCEDURE — 2500000001 HC RX 250 WO HCPCS SELF ADMINISTERED DRUGS (ALT 637 FOR MEDICARE OP): Mod: IPSPLIT | Performed by: INTERNAL MEDICINE

## 2025-05-31 PROCEDURE — 2500000004 HC RX 250 GENERAL PHARMACY W/ HCPCS (ALT 636 FOR OP/ED): Mod: JZ,IPSPLIT | Performed by: SURGERY

## 2025-05-31 PROCEDURE — 2500000004 HC RX 250 GENERAL PHARMACY W/ HCPCS (ALT 636 FOR OP/ED): Mod: JZ,IPSPLIT | Performed by: NURSE PRACTITIONER

## 2025-05-31 PROCEDURE — 82533 TOTAL CORTISOL: CPT | Mod: GENLAB | Performed by: INTERNAL MEDICINE

## 2025-05-31 RX ORDER — INSULIN LISPRO 100 [IU]/ML
0-10 INJECTION, SOLUTION INTRAVENOUS; SUBCUTANEOUS
Status: DISCONTINUED | OUTPATIENT
Start: 2025-05-31 | End: 2025-06-09 | Stop reason: HOSPADM

## 2025-05-31 RX ORDER — ACETAMINOPHEN 650 MG/1
650 SUPPOSITORY RECTAL EVERY 4 HOURS PRN
Status: DISCONTINUED | OUTPATIENT
Start: 2025-05-31 | End: 2025-05-31

## 2025-05-31 RX ORDER — SODIUM CHLORIDE 9 MG/ML
50 INJECTION, SOLUTION INTRAVENOUS CONTINUOUS
Status: ACTIVE | OUTPATIENT
Start: 2025-05-31 | End: 2025-06-01

## 2025-05-31 RX ORDER — CALCIUM GLUCONATE 20 MG/ML
1 INJECTION, SOLUTION INTRAVENOUS ONCE
Status: COMPLETED | OUTPATIENT
Start: 2025-05-31 | End: 2025-05-31

## 2025-05-31 RX ORDER — DIAZEPAM 5 MG/ML
10 INJECTION, SOLUTION INTRAMUSCULAR; INTRAVENOUS EVERY 2 HOUR PRN
Status: DISCONTINUED | OUTPATIENT
Start: 2025-05-31 | End: 2025-05-31

## 2025-05-31 RX ORDER — 3% SODIUM CHLORIDE 3 G/100ML
25 INJECTION, SOLUTION INTRAVENOUS ONCE
Status: DISCONTINUED | OUTPATIENT
Start: 2025-05-31 | End: 2025-05-31

## 2025-05-31 RX ORDER — ACETAMINOPHEN 325 MG/1
650 TABLET ORAL EVERY 4 HOURS PRN
Status: DISCONTINUED | OUTPATIENT
Start: 2025-05-31 | End: 2025-06-03

## 2025-05-31 RX ORDER — POTASSIUM CHLORIDE 14.9 MG/ML
20 INJECTION INTRAVENOUS ONCE
Status: COMPLETED | OUTPATIENT
Start: 2025-05-31 | End: 2025-05-31

## 2025-05-31 RX ORDER — MORPHINE SULFATE 2 MG/ML
1 INJECTION, SOLUTION INTRAMUSCULAR; INTRAVENOUS
Status: DISCONTINUED | OUTPATIENT
Start: 2025-05-31 | End: 2025-06-02

## 2025-05-31 RX ORDER — ACETAMINOPHEN 160 MG/5ML
650 SOLUTION ORAL EVERY 4 HOURS PRN
Status: DISCONTINUED | OUTPATIENT
Start: 2025-05-31 | End: 2025-05-31

## 2025-05-31 RX ORDER — GUAIFENESIN/DEXTROMETHORPHAN 100-10MG/5
5 SYRUP ORAL EVERY 4 HOURS PRN
Status: DISCONTINUED | OUTPATIENT
Start: 2025-05-31 | End: 2025-06-09 | Stop reason: HOSPADM

## 2025-05-31 RX ORDER — CHOLECALCIFEROL (VITAMIN D3) 25 MCG
25 TABLET ORAL DAILY
Status: DISCONTINUED | OUTPATIENT
Start: 2025-05-31 | End: 2025-06-09 | Stop reason: HOSPADM

## 2025-05-31 RX ADMIN — MORPHINE SULFATE 1 MG: 2 INJECTION, SOLUTION INTRAMUSCULAR; INTRAVENOUS at 22:15

## 2025-05-31 RX ADMIN — SODIUM CHLORIDE 100 ML: 3 INJECTION, SOLUTION INTRAVENOUS at 08:19

## 2025-05-31 RX ADMIN — PIPERACILLIN AND TAZOBACTAM 2.25 G: 2; .25 INJECTION, POWDER, FOR SOLUTION INTRAVENOUS at 15:27

## 2025-05-31 RX ADMIN — POTASSIUM CHLORIDE 20 MEQ: 14.9 INJECTION, SOLUTION INTRAVENOUS at 04:53

## 2025-05-31 RX ADMIN — PIPERACILLIN AND TAZOBACTAM 2.25 G: 2; .25 INJECTION, POWDER, FOR SOLUTION INTRAVENOUS at 21:18

## 2025-05-31 RX ADMIN — ACETAMINOPHEN 650 MG: 325 TABLET, FILM COATED ORAL at 21:21

## 2025-05-31 RX ADMIN — CALCIUM GLUCONATE 1 G: 20 INJECTION, SOLUTION INTRAVENOUS at 04:53

## 2025-05-31 RX ADMIN — Medication 25 MCG: at 10:49

## 2025-05-31 RX ADMIN — HEPARIN SODIUM 1200 UNITS/HR: 10000 INJECTION, SOLUTION INTRAVENOUS at 10:07

## 2025-05-31 RX ADMIN — SODIUM CHLORIDE 200 ML/HR: 0.9 INJECTION, SOLUTION INTRAVENOUS at 23:39

## 2025-05-31 RX ADMIN — ACETAMINOPHEN 650 MG: 325 TABLET, FILM COATED ORAL at 11:58

## 2025-05-31 RX ADMIN — SODIUM CHLORIDE, SODIUM LACTATE, POTASSIUM CHLORIDE, AND CALCIUM CHLORIDE 1000 ML: 600; 310; 30; 20 INJECTION, SOLUTION INTRAVENOUS at 21:50

## 2025-05-31 RX ADMIN — SODIUM CHLORIDE 100 ML: 3 INJECTION, SOLUTION INTRAVENOUS at 17:10

## 2025-05-31 RX ADMIN — SODIUM CHLORIDE 125 ML/HR: 0.9 INJECTION, SOLUTION INTRAVENOUS at 13:21

## 2025-05-31 RX ADMIN — METOPROLOL SUCCINATE 100 MG: 100 TABLET, EXTENDED RELEASE ORAL at 08:38

## 2025-05-31 RX ADMIN — ACETAMINOPHEN 650 MG: 325 TABLET, FILM COATED ORAL at 06:47

## 2025-05-31 RX ADMIN — SODIUM CHLORIDE 500 ML: 0.9 INJECTION, SOLUTION INTRAVENOUS at 06:25

## 2025-05-31 RX ADMIN — HEPARIN SODIUM 1100 UNITS/HR: 10000 INJECTION, SOLUTION INTRAVENOUS at 04:21

## 2025-05-31 RX ADMIN — PIPERACILLIN AND TAZOBACTAM 2.25 G: 2; .25 INJECTION, POWDER, FOR SOLUTION INTRAVENOUS at 09:11

## 2025-05-31 ASSESSMENT — PAIN DESCRIPTION - LOCATION
LOCATION: BACK

## 2025-05-31 ASSESSMENT — ENCOUNTER SYMPTOMS
HEADACHES: 0
DIZZINESS: 0
FEVER: 0
CONFUSION: 0
VOMITING: 0
JOINT SWELLING: 0
FATIGUE: 1
DYSURIA: 0
FATIGUE: 0
BACK PAIN: 0
DIARRHEA: 0
NAUSEA: 0
ABDOMINAL PAIN: 0
WEAKNESS: 0
FEVER: 1
APPETITE CHANGE: 1
NERVOUS/ANXIOUS: 0
COUGH: 0
SHORTNESS OF BREATH: 0

## 2025-05-31 ASSESSMENT — PAIN SCALES - GENERAL
PAINLEVEL_OUTOF10: 6
PAINLEVEL_OUTOF10: 0 - NO PAIN
PAINLEVEL_OUTOF10: 3
PAINLEVEL_OUTOF10: 0 - NO PAIN
PAINLEVEL_OUTOF10: 0 - NO PAIN
PAINLEVEL_OUTOF10: 2
PAINLEVEL_OUTOF10: 0 - NO PAIN
PAINLEVEL_OUTOF10: 6
PAINLEVEL_OUTOF10: 8
PAINLEVEL_OUTOF10: 3

## 2025-05-31 ASSESSMENT — PAIN - FUNCTIONAL ASSESSMENT
PAIN_FUNCTIONAL_ASSESSMENT: 0-10

## 2025-05-31 ASSESSMENT — PAIN DESCRIPTION - DESCRIPTORS
DESCRIPTORS: ACHING;DISCOMFORT
DESCRIPTORS: ACHING
DESCRIPTORS: ACHING;NAGGING
DESCRIPTORS: ACHING;SHOOTING

## 2025-05-31 ASSESSMENT — PAIN DESCRIPTION - ORIENTATION
ORIENTATION: MID;LOWER
ORIENTATION: MID;LOWER
ORIENTATION: LOWER

## 2025-05-31 NOTE — NURSING NOTE
"0700: assumed care of patient  0815: Dr. Ramirez and Clarisse Roldan CNP at bedside for rounds. Clarified sodium chloride hypertonic 3% should be ran at 100ml/hr over 1 hour for bolus. Verified with [facility] that this can run like that for a bolus. Dr. Ramirez states to stop sodium chloride 0.9% infusion until 3% bolus complete, see Mar for changes   0956: Sodium level has increased to 112, Clarisse Roldan CNP aware of same and states to continue Sodium 0.9% infusion per orders. Patient resting comfortably in bed.   1256: Patient has a current powell in place, noted patient to have a total of 300 ml of urine out in 6 hours. Bladder scanner completed and showed 2ml. Patient states she feels \"full\" in bladder Asked provider for a flush order for catheter, provider Clarisse Roldan states she will review case and get back to nursing.   1630: Dr. MCKEON at bedside for rounds   1700: Sodium levels have decreased, See new orders. Neuro's are WNLs.   "

## 2025-05-31 NOTE — CONSULTS
Inpatient consult to Infectious Diseases  Consult performed by: Nemesio Hansen MD  Consult ordered by: Clarisse Roldan, APRN-CNP            Primary MD: Makenzie Esparza MD    Reason For Consult  Positive blood cultures    History Of Present Illness  Mireya Nava is a 74 y.o. female presenting with nausea, vomiting, and decreased appetite.  She ate a sandwich about a week prior to presentation.  She subsequently had nausea and vomiting.  She also reports having chills but denies any fevers.  She had decreased urinary output and came to the hospital for further care.  Workup was remarkable for elevated serum creatinine.  She had interval positive blood culture for E. coli.  She denies any fevers.  She had interval positive blood culture for E. coli.  She is on IV Zosyn     Past Medical History  She has a past medical history of Asymptomatic menopausal state (06/03/2022), Encounter for screening mammogram for malignant neoplasm of breast (01/12/2021), and Seasonal allergies (08/19/2019).    Surgical History  She has no past surgical history on file.     Social History     Occupational History    Not on file   Tobacco Use    Smoking status: Never    Smokeless tobacco: Never   Substance and Sexual Activity    Alcohol use: Not Currently     Comment: rare    Drug use: Never    Sexual activity: Not on file     Travel History   Travel since 04/30/25    No documented travel since 04/30/25         Family History  Family History[1]  Allergies  Patient has no known allergies.     Immunization History   Administered Date(s) Administered    Flu vaccine, quadrivalent, high-dose, preservative free, age 65y+ (FLUZONE) 10/13/2020, 10/25/2022    Flu vaccine, trivalent, preservative free, HIGH-DOSE, age 65y+ (Fluzone) 09/30/2016, 10/07/2017, 10/07/2018, 10/12/2019, 10/07/2024    Flu vaccine, trivalent, preservative free, age 6 months and greater (Fluarix/Fluzone/Flulaval) 10/07/2015    Influenza, Seasonal, Quadrivalent, Adjuvanted  "10/08/2021, 10/08/2023    Influenza, Unspecified 10/01/2018    Influenza, seasonal, injectable 10/16/2013, 09/30/2014, 10/01/2020    Pfizer COVID-19 vaccine, 12 years and older, (30mcg/0.3mL) (Comirnaty) 10/08/2023, 10/07/2024    Pfizer COVID-19 vaccine, bivalent, age 12 years and older (30 mcg/0.3 mL) 10/25/2022    Pfizer Purple Cap SARS-CoV-2 03/13/2021, 04/17/2021, 01/22/2022    Pneumococcal conjugate vaccine, 13-valent (PREVNAR 13) 10/01/2016    Pneumococcal polysaccharide vaccine, 23-valent, age 2 years and older (PNEUMOVAX 23) 01/01/2018    Tdap vaccine, age 7 year and older (BOOSTRIX, ADACEL) 02/01/2012    Zoster vaccine, recombinant, adult (SHINGRIX) 12/01/2013    Zoster, live 12/03/2013     Medications  Home medications:  Prescriptions Prior to Admission[2]  Current medications:  Scheduled medications  Scheduled Medications[3]  Continuous medications  Continuous Medications[4]  PRN medications  PRN Medications[5]    Review of Systems   Constitutional:  Positive for chills. Negative for fever.   Gastrointestinal:  Positive for nausea and vomiting. Negative for abdominal pain.   All other systems reviewed and are negative.       Objective  Range of Vitals (last 24 hours)  Heart Rate:  [61-99]   Temp:  [36 °C (96.8 °F)-36.6 °C (97.9 °F)]   Resp:  [14-33]   BP: ()/(50-72)   Height:  [162.6 cm (5' 4\")]   Weight:  [68.9 kg (151 lb 14.4 oz)-71.7 kg (158 lb 1.1 oz)]   SpO2:  [90 %-100 %]   Daily Weight  05/31/25 : 71.7 kg (158 lb 1.1 oz)    Body mass index is 27.13 kg/m².     Physical Exam  Constitutional:       Appearance: Normal appearance.   HENT:      Head: Normocephalic and atraumatic.      Right Ear: External ear normal.      Left Ear: External ear normal.      Nose: Nose normal.   Eyes:      General: No scleral icterus.     Extraocular Movements: Extraocular movements intact.      Conjunctiva/sclera: Conjunctivae normal.   Cardiovascular:      Rate and Rhythm: Normal rate and regular rhythm. " "  Musculoskeletal:      Cervical back: Normal range of motion and neck supple.   Neurological:      Mental Status: She is alert.          Relevant Results  Outside Hospital Results    Labs  Results from last 72 hours   Lab Units 05/31/25  0721 05/30/25  2325 05/30/25  1119   WBC AUTO x10*3/uL 19.3* 18.8* 19.8*   HEMOGLOBIN g/dL 8.2* 9.2* 10.8*   HEMATOCRIT % 21.9* 24.0* 28.8*   PLATELETS AUTO x10*3/uL 151 155 186   NEUTROS PCT AUTO %  --   --  93.2   LYMPHS PCT AUTO %  --   --  1.1   MONOS PCT AUTO %  --   --  3.1   EOS PCT AUTO %  --   --  0.1     Results from last 72 hours   Lab Units 05/31/25  1219 05/31/25  0721 05/31/25  0335   SODIUM mmol/L 112* 112*  112* 109*   POTASSIUM mmol/L 4.0 3.6  3.6 3.3*   CHLORIDE mmol/L 83* 85*  85* 81*   CO2 mmol/L 18* 18*  18* 19*   BUN mg/dL 52* 51*  51* 50*   CREATININE mg/dL 2.98* 2.81*  2.81* 2.78*   GLUCOSE mg/dL 101* 127*  127* 360*   CALCIUM mg/dL 7.4* 6.9*  6.9* 6.6*   ANION GAP mmol/L 15 13  13 12   EGFR mL/min/1.73m*2 16* 17*  17* 17*   PHOSPHORUS mg/dL  --  2.5  --      Results from last 72 hours   Lab Units 05/31/25  0721 05/30/25  1119   ALK PHOS U/L  --  178*   BILIRUBIN TOTAL mg/dL  --  1.0   PROTEIN TOTAL g/dL  --  6.9   ALT U/L  --  33   AST U/L  --  28   ALBUMIN g/dL 2.2* 3.3*     Estimated Creatinine Clearance: 15.6 mL/min (A) (by C-G formula based on SCr of 2.98 mg/dL (H)).  No results found for: \"CRP\", \"SEDRATE\"  No results found for: \"HIV1X2\", \"HIVCONF\", \"OYCCRI0XQ\"  No results found for: \"HEPCABINIT\", \"HEPCAB\", \"HCVPCRQUANT\"  Microbiology  Susceptibility data from last 90 days.  Collected Specimen Info Organism   05/30/25 Blood culture from Peripheral Venipuncture Escherichia coli   05/30/25 Blood culture from Peripheral Venipuncture Escherichia coli       Imaging  CT chest abdomen pelvis wo IV contrast  Result Date: 5/30/2025  Interpreted By:  Shanel Valencia, STUDY: CT CHEST ABDOMEN PELVIS WO CONTRAST;  5/30/2025 12:50 pm   INDICATION: " Signs/Symptoms:cough, abd pain.   COMPARISON: None.   ACCESSION NUMBER(S): [id number]   ORDERING CLINICIAN: DARA WELCH   TECHNIQUE: CT of the chest, abdomen, and pelvis was performed without intravenous contrast.   FINDINGS:   CHEST:   Lines/Devices: None   Lungs: The trachea and central airways are patent without endobronchial lesions. There is a small left pleural effusion with adjacent compressive atelectasis. No focal consolidation, pulmonary edema, pneumothorax or suspicious nodule.   Vasculature: Mild dilation of the thoracic aorta in the ascending segment measuring 4 cm. The main pulmonary artery is normal in size. Mild coronary artery calcifications noted in the LAD.   Heart: Heart size is normal. No pericardial effusion.   Mediastinum and lyudmila: No pathologically enlarged thoracic lymphadenopathy. The esophagus is unremarkable.   Chest wall and lower neck: No significant chest wall soft tissue abnormalities. The visualized thyroid is normal.   ABDOMEN/PELVIS:   Liver: No mass. Hepatomegaly measuring 20 cm in the craniocaudal dimension.   Biliary: No intrahepatic or extrahepatic bile duct dilation. The gallbladder is collapsed and limited for evaluation.   Spleen: No mass. No splenomegaly.   Pancreas: No mass, main duct dilation or acute inflammation.   Adrenals: Normal.   Kidneys: No calculus or hydronephrosis.   GI tract: There is a small amount of free fluid and fat stranding centered around the cecum. The appendix is not identified. No pericecal free air or organized fluid collection. There are multiple loops of fluid-filled, nondilated small bowel in the pelvis. No bowel obstruction or bowel wall thickening otherwise.   Lymph nodes: No abdominopelvic lymphadenopathy.   Mesentery/peritoneum: No free air or fluid collection. There is mild generalized haziness involving the peritoneum.   Vasculature: Moderate abdominal aortic atherosclerotic calcifications without aneurysmal dilation.   Pelvis: No free  air or fluid collection. Trace free fluid in the dependent pelvis. The bladder is moderate distended but otherwise normal-appearing. The uterus appears grossly unremarkable.   Bones/Soft tissues: Mild levocurvature of the lumbar spine with multilevel thoracolumbar degenerative disc disease. Moderate to severe bilateral hip osteoarthritis. Mild abdominal wall edema.         Limited examination without intravenous contrast.   The appendix is not visualized, although right lower quadrant inflammation and free fluid centered around the cecum raise concern for possible acute appendicitis. No organized fluid collection. Please correlate with right lower quadrant tenderness.   Multiple fluid-filled small bowel within the pelvis could represent nonspecific enteritis. No bowel obstruction.   Small left pleural effusion without definite evidence of pneumonia.   4 cm ascending thoracic aortic aneurysm.   MACRO Shanel Valencia discussed the significance and urgency of this critical finding by Epic secure chat with  DARA WELCH on 5/30/2025 at 2:03 pm.  (**-RCF-**) Findings:  See findings.   Signed by: Shanel Valencia 5/30/2025 2:05 PM Dictation workstation:   [id number]     Assessment/Plan   Sepsis secondary to E. coli bacteremia  E. coli bacteremic, possibly secondary to urinary tract infection  Acute kidney injury  Leukocytosis, multifactorial    Continue Zosyn  Follow-up susceptibility of E. coli  Follow-up urine culture  Monitor renal function  Supportive care  Further recommendations based on culture results    This is a complex infectious disease issue and the following was performed today (for more details please see the above note): Management decisions reflecting the added complexity (e.g., changes in antimicrobial therapy, infection control strategies).   Nemesio Hansen MD         [1] No family history on file.  [2]   Medications Prior to Admission   Medication Sig Dispense Refill Last Dose/Taking    amLODIPine  (Norvasc) 10 mg tablet Take 1 tablet (10 mg) by mouth once daily. 90 tablet 1 5/29/2025 at  4:30 AM    calcium carbonate-vitamin D3 (Os-Jovany 500 + D3) 500 mg-5 mcg (200 unit) tablet Take by mouth.   More than a month    cholecalciferol (Vitamin D3) 25 mcg (1,000 units) tablet Take 1 tablet (25 mcg) by mouth once daily.   5/29/2025 at  4:30 PM    losartan (Cozaar) 100 mg tablet Take 1 tablet (100 mg) by mouth once daily. 90 tablet 0 5/29/2025 at  4:30 PM    metoprolol succinate XL (Toprol-XL) 100 mg 24 hr tablet Take 1 tablet (100 mg) by mouth once daily. 90 tablet 1 5/29/2025 at  4:30 PM   [3] [Held by provider] amLODIPine, 10 mg, oral, Daily  cholecalciferol, 25 mcg, oral, Daily  insulin lispro, 0-10 Units, subcutaneous, TID AC  [Held by provider] losartan, 100 mg, oral, Daily  metoprolol succinate XL, 100 mg, oral, Daily  piperacillin-tazobactam (Zosyn) 2.25 g in dextrose 5% 50 mL IV, 2.25 g, intravenous, q6h    [4] heparin, 0-4,500 Units/hr, Last Rate: 1,300 Units/hr (05/31/25 1325)  sodium chloride 0.9%, 125 mL/hr, Last Rate: 125 mL/hr (05/31/25 1321)    [5] PRN medications: acetaminophen **OR** [DISCONTINUED] acetaminophen **OR** [DISCONTINUED] acetaminophen, heparin

## 2025-05-31 NOTE — CARE PLAN
"The patient's goals for the shift include \"get some sleep\"    The clinical goals for the shift include Pt sodium with safely correct by morning and she will remain HDS for entire shift. Pt will convert from Afib to NS by end of shift.      "

## 2025-05-31 NOTE — PROGRESS NOTES
Mireya Nava is a 74 y.o. female on day 1 of admission presenting with Generalized weakness.      Subjective   Pt pleasant and without complaints except weakness        Objective     Last Recorded Vitals  BP 92/58 (BP Location: Left arm, Patient Position: Lying)   Pulse 61   Temp 36.5 °C (97.7 °F) (Temporal)   Resp 15   Wt 71.7 kg (158 lb 1.1 oz)   SpO2 96%   Intake/Output last 3 Shifts:    Intake/Output Summary (Last 24 hours) at 5/31/2025 1041  Last data filed at 5/31/2025 0919  Gross per 24 hour   Intake 2372.38 ml   Output 925 ml   Net 1447.38 ml       Admission Weight  Weight: 65.8 kg (145 lb) (05/30/25 1036)    Daily Weight  05/31/25 : 71.7 kg (158 lb 1.1 oz)    Image Results  CT chest abdomen pelvis wo IV contrast  Narrative: Interpreted By:  Shanel Valencia,   STUDY:  CT CHEST ABDOMEN PELVIS WO CONTRAST;  5/30/2025 12:50 pm      INDICATION:  Signs/Symptoms:cough, abd pain.      COMPARISON:  None.      ACCESSION NUMBER(S):  [id number]      ORDERING CLINICIAN:  DARA WELCH      TECHNIQUE:  CT of the chest, abdomen, and pelvis was performed without  intravenous contrast.      FINDINGS:      CHEST:      Lines/Devices: None      Lungs: The trachea and central airways are patent without  endobronchial lesions. There is a small left pleural effusion with  adjacent compressive atelectasis. No focal consolidation, pulmonary  edema, pneumothorax or suspicious nodule.      Vasculature: Mild dilation of the thoracic aorta in the ascending  segment measuring 4 cm. The main pulmonary artery is normal in size.  Mild coronary artery calcifications noted in the LAD.      Heart: Heart size is normal. No pericardial effusion.      Mediastinum and lyudmila: No pathologically enlarged thoracic  lymphadenopathy. The esophagus is unremarkable.      Chest wall and lower neck: No significant chest wall soft tissue  abnormalities. The visualized thyroid is normal.      ABDOMEN/PELVIS:      Liver: No mass. Hepatomegaly  measuring 20 cm in the craniocaudal  dimension.      Biliary: No intrahepatic or extrahepatic bile duct dilation. The  gallbladder is collapsed and limited for evaluation.      Spleen: No mass. No splenomegaly.      Pancreas: No mass, main duct dilation or acute inflammation.      Adrenals: Normal.      Kidneys: No calculus or hydronephrosis.      GI tract: There is a small amount of free fluid and fat stranding  centered around the cecum. The appendix is not identified. No  pericecal free air or organized fluid collection. There are multiple  loops of fluid-filled, nondilated small bowel in the pelvis. No bowel  obstruction or bowel wall thickening otherwise.      Lymph nodes: No abdominopelvic lymphadenopathy.      Mesentery/peritoneum: No free air or fluid collection. There is mild  generalized haziness involving the peritoneum.      Vasculature: Moderate abdominal aortic atherosclerotic calcifications  without aneurysmal dilation.      Pelvis: No free air or fluid collection. Trace free fluid in the  dependent pelvis. The bladder is moderate distended but otherwise  normal-appearing. The uterus appears grossly unremarkable.      Bones/Soft tissues: Mild levocurvature of the lumbar spine with  multilevel thoracolumbar degenerative disc disease. Moderate to  severe bilateral hip osteoarthritis. Mild abdominal wall edema.      Impression:     Limited examination without intravenous contrast.      The appendix is not visualized, although right lower quadrant  inflammation and free fluid centered around the cecum raise concern  for possible acute appendicitis. No organized fluid collection.  Please correlate with right lower quadrant tenderness.      Multiple fluid-filled small bowel within the pelvis could represent  nonspecific enteritis. No bowel obstruction.      Small left pleural effusion without definite evidence of pneumonia.      4 cm ascending thoracic aortic aneurysm.      MACRO  Shanel matthews  the significance and urgency of this  critical finding by Epic secure chat with  DARA WELCH on 5/30/2025  at 2:03 pm.  (**-RCF-**) Findings:  See findings.      Signed by: Shanel Valencia 5/30/2025 2:05 PM  Dictation workstation:   [id number]      Physical Exam  Constitutional:       Appearance: Normal appearance.   HENT:      Head: Normocephalic.      Nose: Nose normal.      Mouth/Throat:      Mouth: Mucous membranes are moist.   Eyes:      Extraocular Movements: Extraocular movements intact.   Cardiovascular:      Rate and Rhythm: Normal rate and regular rhythm.      Pulses: Normal pulses.      Heart sounds: Normal heart sounds.   Pulmonary:      Effort: Pulmonary effort is normal.      Breath sounds: Normal breath sounds.   Abdominal:      General: Bowel sounds are normal.      Palpations: Abdomen is soft.   Musculoskeletal:         General: Normal range of motion.      Cervical back: Normal range of motion.   Skin:     General: Skin is warm and dry.      Capillary Refill: Capillary refill takes less than 2 seconds.   Neurological:      General: No focal deficit present.      Mental Status: She is alert and oriented to person, place, and time.   Psychiatric:         Mood and Affect: Mood normal.         Behavior: Behavior normal.         Relevant Results             Scheduled medications  Scheduled Medications[1]  Continuous medications  Continuous Medications[2]  PRN medications  PRN Medications[3]  Results for orders placed or performed during the hospital encounter of 05/30/25 (from the past 24 hours)   CBC and Auto Differential   Result Value Ref Range    WBC 19.8 (H) 4.4 - 11.3 x10*3/uL    nRBC 0.0 0.0 - 0.0 /100 WBCs    RBC 3.51 (L) 4.00 - 5.20 x10*6/uL    Hemoglobin 10.8 (L) 12.0 - 16.0 g/dL    Hematocrit 28.8 (L) 36.0 - 46.0 %    MCV 82 80 - 100 fL    MCH 30.8 26.0 - 34.0 pg    MCHC 37.5 (H) 32.0 - 36.0 g/dL    RDW 13.8 11.5 - 14.5 %    Platelets 186 150 - 450 x10*3/uL    Neutrophils % 93.2 40.0 - 80.0  %    Immature Granulocytes %, Automated 2.1 (H) 0.0 - 0.9 %    Lymphocytes % 1.1 13.0 - 44.0 %    Monocytes % 3.1 2.0 - 10.0 %    Eosinophils % 0.1 0.0 - 6.0 %    Basophils % 0.4 0.0 - 2.0 %    Neutrophils Absolute 18.50 (H) 1.60 - 5.50 x10*3/uL    Immature Granulocytes Absolute, Automated 0.42 0.00 - 0.50 x10*3/uL    Lymphocytes Absolute 0.21 (L) 0.80 - 3.00 x10*3/uL    Monocytes Absolute 0.61 0.05 - 0.80 x10*3/uL    Eosinophils Absolute 0.01 0.00 - 0.40 x10*3/uL    Basophils Absolute 0.07 0.00 - 0.10 x10*3/uL   Comprehensive metabolic panel   Result Value Ref Range    Glucose 112 (H) 74 - 99 mg/dL    Sodium 109 (LL) 136 - 145 mmol/L    Potassium 3.9 3.5 - 5.3 mmol/L    Chloride 76 (L) 98 - 107 mmol/L    Bicarbonate 20 (L) 21 - 32 mmol/L    Anion Gap 17 10 - 20 mmol/L    Urea Nitrogen 52 (H) 6 - 23 mg/dL    Creatinine 2.68 (H) 0.50 - 1.05 mg/dL    eGFR 18 (L) >60 mL/min/1.73m*2    Calcium 8.5 (L) 8.6 - 10.3 mg/dL    Albumin 3.3 (L) 3.4 - 5.0 g/dL    Alkaline Phosphatase 178 (H) 33 - 136 U/L    Total Protein 6.9 6.4 - 8.2 g/dL    AST 28 9 - 39 U/L    Bilirubin, Total 1.0 0.0 - 1.2 mg/dL    ALT 33 7 - 45 U/L   Lactate   Result Value Ref Range    Lactate 1.0 0.4 - 2.0 mmol/L   Blood Culture    Specimen: Peripheral Venipuncture; Blood culture   Result Value Ref Range    Blood Culture Escherichia coli (A)     BLOOD CULTURE BOTTLE  Positive Aerobic and Anaerobic Bottle     Gram Stain Gram negative bacilli (AA)     Gram Stain Gram negative bacilli (AA)    Blood Culture    Specimen: Peripheral Venipuncture; Blood culture   Result Value Ref Range    Blood Culture Escherichia coli (A)     BLOOD CULTURE BOTTLE  Positive Aerobic Bottle     Gram Stain Gram negative bacilli (AA)    Sars-CoV-2 and Influenza A/B PCR   Result Value Ref Range    Flu A Result Not Detected Not Detected    Flu B Result Not Detected Not Detected    Coronavirus 2019, PCR Not Detected Not Detected   BCID-GN    Specimen: Peripheral Venipuncture; Blood  culture   Result Value Ref Range    Escherichia coli Detected (AA) Not Detected   Urinalysis with Reflex Culture and Microscopic   Result Value Ref Range    Color, Urine Light-Orange (N) Light-Yellow, Yellow, Dark-Yellow    Appearance, Urine Turbid (N) Clear    Specific Gravity, Urine 1.009 1.005 - 1.035    pH, Urine 6.0 5.0, 5.5, 6.0, 6.5, 7.0, 7.5, 8.0    Protein, Urine 100 (2+) (A) NEGATIVE, 10 (TRACE), 20 (TRACE) mg/dL    Glucose, Urine Normal Normal mg/dL    Blood, Urine 0.5 (2+) (A) NEGATIVE mg/dL    Ketones, Urine NEGATIVE NEGATIVE mg/dL    Bilirubin, Urine NEGATIVE NEGATIVE mg/dL    Urobilinogen, Urine Normal Normal mg/dL    Nitrite, Urine NEGATIVE NEGATIVE    Leukocyte Esterase, Urine 500 Nina/uL (A) NEGATIVE   Microscopic Only, Urine   Result Value Ref Range    WBC, Urine >50 (A) 1-5, NONE /HPF    RBC, Urine 11-20 (A) NONE, 1-2, 3-5 /HPF    Squamous Epithelial Cells, Urine 1-9 (SPARSE) Reference range not established. /HPF    Transitional Epithelial Cells, Urine 1-2 (FEW) Reference range not established. /HPF    Bacteria, Urine 4+ (A) NONE SEEN /HPF    Mucus, Urine FEW Reference range not established. /LPF    Hyaline Casts, Urine 1+ (A) NONE /LPF    Fine Granular Casts, Urine 1+ (A) NONE /LPF   Osmolality, urine   Result Value Ref Range    Osmolality, Urine Random 209 200 - 1,200 mOsm/kg   Sodium, Urine Random   Result Value Ref Range    Sodium, Urine Random 22 mmol/L    Creatinine, Urine Random 71.1 20.0 - 320.0 mg/dL    Sodium/Creatinine Ratio 31 Not established. mmol/g Creat   Basic Metabolic Panel   Result Value Ref Range    Glucose 103 (H) 74 - 99 mg/dL    Sodium 109 (LL) 136 - 145 mmol/L    Potassium 3.5 3.5 - 5.3 mmol/L    Chloride 79 (L) 98 - 107 mmol/L    Bicarbonate 19 (L) 21 - 32 mmol/L    Anion Gap 15 10 - 20 mmol/L    Urea Nitrogen 51 (H) 6 - 23 mg/dL    Creatinine 2.68 (H) 0.50 - 1.05 mg/dL    eGFR 18 (L) >60 mL/min/1.73m*2    Calcium 7.5 (L) 8.6 - 10.3 mg/dL   Basic metabolic panel   Result  Value Ref Range    Glucose 97 74 - 99 mg/dL    Sodium 109 (LL) 136 - 145 mmol/L    Potassium 3.8 3.5 - 5.3 mmol/L    Chloride 79 (L) 98 - 107 mmol/L    Bicarbonate 20 (L) 21 - 32 mmol/L    Anion Gap 14 10 - 20 mmol/L    Urea Nitrogen 50 (H) 6 - 23 mg/dL    Creatinine 2.72 (H) 0.50 - 1.05 mg/dL    eGFR 18 (L) >60 mL/min/1.73m*2    Calcium 7.9 (L) 8.6 - 10.3 mg/dL   Osmolality   Result Value Ref Range    Osmolality, Serum 251 (L) 280 - 300 mOsm/kg   CBC   Result Value Ref Range    WBC 18.8 (H) 4.4 - 11.3 x10*3/uL    nRBC 0.0 0.0 - 0.0 /100 WBCs    RBC 2.97 (L) 4.00 - 5.20 x10*6/uL    Hemoglobin 9.2 (L) 12.0 - 16.0 g/dL    Hematocrit 24.0 (L) 36.0 - 46.0 %    MCV 81 80 - 100 fL    MCH 31.0 26.0 - 34.0 pg    MCHC 38.3 (H) 32.0 - 36.0 g/dL    RDW 13.4 11.5 - 14.5 %    Platelets 155 150 - 450 x10*3/uL   Heparin Assay, UFH   Result Value Ref Range    Heparin Unfractionated 0.3 See Comment Below for Therapeutic Ranges IU/mL   Basic metabolic panel   Result Value Ref Range    Glucose 98 74 - 99 mg/dL    Sodium 111 (LL) 136 - 145 mmol/L    Potassium 3.4 (L) 3.5 - 5.3 mmol/L    Chloride 81 (L) 98 - 107 mmol/L    Bicarbonate 20 (L) 21 - 32 mmol/L    Anion Gap 13 10 - 20 mmol/L    Urea Nitrogen 52 (H) 6 - 23 mg/dL    Creatinine 2.95 (H) 0.50 - 1.05 mg/dL    eGFR 16 (L) >60 mL/min/1.73m*2    Calcium 7.3 (L) 8.6 - 10.3 mg/dL   Basic metabolic panel   Result Value Ref Range    Glucose 360 (H) 74 - 99 mg/dL    Sodium 109 (LL) 136 - 145 mmol/L    Potassium 3.3 (L) 3.5 - 5.3 mmol/L    Chloride 81 (L) 98 - 107 mmol/L    Bicarbonate 19 (L) 21 - 32 mmol/L    Anion Gap 12 10 - 20 mmol/L    Urea Nitrogen 50 (H) 6 - 23 mg/dL    Creatinine 2.78 (H) 0.50 - 1.05 mg/dL    eGFR 17 (L) >60 mL/min/1.73m*2    Calcium 6.6 (L) 8.6 - 10.3 mg/dL   Heparin Assay, UFH   Result Value Ref Range    Heparin Unfractionated 0.8 See Comment Below for Therapeutic Ranges IU/mL   CBC   Result Value Ref Range    WBC 19.3 (H) 4.4 - 11.3 x10*3/uL    nRBC 0.0 0.0 -  0.0 /100 WBCs    RBC 2.67 (L) 4.00 - 5.20 x10*6/uL    Hemoglobin 8.2 (L) 12.0 - 16.0 g/dL    Hematocrit 21.9 (L) 36.0 - 46.0 %    MCV 82 80 - 100 fL    MCH 30.7 26.0 - 34.0 pg    MCHC 37.4 (H) 32.0 - 36.0 g/dL    RDW 13.8 11.5 - 14.5 %    Platelets 151 150 - 450 x10*3/uL   Renal Function Panel   Result Value Ref Range    Glucose 127 (H) 74 - 99 mg/dL    Sodium 112 (LL) 136 - 145 mmol/L    Potassium 3.6 3.5 - 5.3 mmol/L    Chloride 85 (L) 98 - 107 mmol/L    Bicarbonate 18 (L) 21 - 32 mmol/L    Anion Gap 13 10 - 20 mmol/L    Urea Nitrogen 51 (H) 6 - 23 mg/dL    Creatinine 2.81 (H) 0.50 - 1.05 mg/dL    eGFR 17 (L) >60 mL/min/1.73m*2    Calcium 6.9 (L) 8.6 - 10.3 mg/dL    Phosphorus 2.5 2.5 - 4.9 mg/dL    Albumin 2.2 (L) 3.4 - 5.0 g/dL   Magnesium   Result Value Ref Range    Magnesium 1.75 1.60 - 2.40 mg/dL   TSH   Result Value Ref Range    Thyroid Stimulating Hormone 0.58 0.44 - 3.98 mIU/L   Heparin Assay, UFH   Result Value Ref Range    Heparin Unfractionated 0.1 See Comment Below for Therapeutic Ranges IU/mL   Basic metabolic panel   Result Value Ref Range    Glucose 127 (H) 74 - 99 mg/dL    Sodium 112 (LL) 136 - 145 mmol/L    Potassium 3.6 3.5 - 5.3 mmol/L    Chloride 85 (L) 98 - 107 mmol/L    Bicarbonate 18 (L) 21 - 32 mmol/L    Anion Gap 13 10 - 20 mmol/L    Urea Nitrogen 51 (H) 6 - 23 mg/dL    Creatinine 2.81 (H) 0.50 - 1.05 mg/dL    eGFR 17 (L) >60 mL/min/1.73m*2    Calcium 6.9 (L) 8.6 - 10.3 mg/dL   POCT GLUCOSE   Result Value Ref Range    POCT Glucose 128 (H) 74 - 99 mg/dL     CT chest abdomen pelvis wo IV contrast  Result Date: 5/30/2025  Interpreted By:  Shanel Valencia, STUDY: CT CHEST ABDOMEN PELVIS WO CONTRAST;  5/30/2025 12:50 pm   INDICATION: Signs/Symptoms:cough, abd pain.   COMPARISON: None.   ACCESSION NUMBER(S): [id number]   ORDERING CLINICIAN: DARA WELCH   TECHNIQUE: CT of the chest, abdomen, and pelvis was performed without intravenous contrast.   FINDINGS:   CHEST:   Lines/Devices: None    Lungs: The trachea and central airways are patent without endobronchial lesions. There is a small left pleural effusion with adjacent compressive atelectasis. No focal consolidation, pulmonary edema, pneumothorax or suspicious nodule.   Vasculature: Mild dilation of the thoracic aorta in the ascending segment measuring 4 cm. The main pulmonary artery is normal in size. Mild coronary artery calcifications noted in the LAD.   Heart: Heart size is normal. No pericardial effusion.   Mediastinum and lyudmila: No pathologically enlarged thoracic lymphadenopathy. The esophagus is unremarkable.   Chest wall and lower neck: No significant chest wall soft tissue abnormalities. The visualized thyroid is normal.   ABDOMEN/PELVIS:   Liver: No mass. Hepatomegaly measuring 20 cm in the craniocaudal dimension.   Biliary: No intrahepatic or extrahepatic bile duct dilation. The gallbladder is collapsed and limited for evaluation.   Spleen: No mass. No splenomegaly.   Pancreas: No mass, main duct dilation or acute inflammation.   Adrenals: Normal.   Kidneys: No calculus or hydronephrosis.   GI tract: There is a small amount of free fluid and fat stranding centered around the cecum. The appendix is not identified. No pericecal free air or organized fluid collection. There are multiple loops of fluid-filled, nondilated small bowel in the pelvis. No bowel obstruction or bowel wall thickening otherwise.   Lymph nodes: No abdominopelvic lymphadenopathy.   Mesentery/peritoneum: No free air or fluid collection. There is mild generalized haziness involving the peritoneum.   Vasculature: Moderate abdominal aortic atherosclerotic calcifications without aneurysmal dilation.   Pelvis: No free air or fluid collection. Trace free fluid in the dependent pelvis. The bladder is moderate distended but otherwise normal-appearing. The uterus appears grossly unremarkable.   Bones/Soft tissues: Mild levocurvature of the lumbar spine with multilevel  thoracolumbar degenerative disc disease. Moderate to severe bilateral hip osteoarthritis. Mild abdominal wall edema.         Limited examination without intravenous contrast.   The appendix is not visualized, although right lower quadrant inflammation and free fluid centered around the cecum raise concern for possible acute appendicitis. No organized fluid collection. Please correlate with right lower quadrant tenderness.   Multiple fluid-filled small bowel within the pelvis could represent nonspecific enteritis. No bowel obstruction.   Small left pleural effusion without definite evidence of pneumonia.   4 cm ascending thoracic aortic aneurysm.   MACRO Shanel Valencia discussed the significance and urgency of this critical finding by Epic secure chat with  DARA WELCH on 5/30/2025 at 2:03 pm.  (**-RCF-**) Findings:  See findings.   Signed by: Shanel Valencia 5/30/2025 2:05 PM Dictation workstation:   [id number]             This patient has a urinary catheter   Reason for the urinary catheter remaining today? critically ill patient who need accurate urinary output measurements          Assessment & Plan  Primary hypertension    Hyperlipidemia    Hypo-osmolar hyponatremia    Mireya Nava is a 74 y.o. female with past medical history of hypertension, anemia, detached retina status post repair, who came to the hospital secondary to nausea, vomiting, decreased appetite who is being admitted to the hospital with severe hyponatremia, nonspecific enteritis, acute kidney injury, urinary tract infection, urinary retention, new onset atrial fibrillation.     Severe hypo-osmolar hyponatremia  - 5/30 serum osmo 251  -TSH 0.58  - urine Na 22 - consistent with hypovolemic hyponatremia  - Likely secondary to dehydration based on her history.  - Patient is not on any obvious home medications to cause hyponatremia.  - Sodium was 130 on 5/30/2024.  - Will admit to the ICU.  - Given IV fluids with normal saline in the ER of 1 L  LR and repeat sodium is unchanged at 109.  - Patient is alert and oriented to self, birthday, president and year and appears mentating well.  She does have some generalized weakness but no obvious gross focal neurologic deficits.  - Will check cortisol  - Will place on 3% hypertonic saline at 15 mL/h for 4 hours and check BMPs every 4 hours.  - Goal to achieve a 24-hour increase in serum sodium of 4 to 6 mEq/L.   - Placed on seizure precautions.  - Consult critical care.  - Monitor.    - Patient's daughter and son were at the bedside and all of their questions were answered.     Acute kidney injury  - Likely secondary to dehydration with decreased oral intake.  - Check urine creatinine, urine sodium to calculate FeNa.   - Patient given 1 L of IV fluids in the ER.  As above, will place on hypertonic saline at 15 mL/h for 4 hours and check BMPs every 4 hours.  5/31 received D5 .45 overnight - bolus 100ml 3% NS this am and then 0.9NaCl @125ml/hr and monitor q4hr BMP - making urine approx. 50ml/hr - encourage po intake  - CT scan of the abdomen pelvis with no signs of hydronephrosis, but patient apparently had some urinary retention, Campbell catheter was placed.  -Hold home medication of losartan with acute kidney injury.  - Monitor.     Nonspecific enteritis  -Dr. Sanderson, general surgeon, was contacted by the ER provider for a consult - appreciate recs - feels no surgical need at this time   - Will place on clinical diet at this time and monitor.   - WBC 19.8>18.8>19.3  - blood culture  gram (-) neg bacilli aerobic and anaerobic bottles - change to zosyn    New onset atrial fibrillation  - Patient is on metoprolol  mg at home, which we will continue.  - Place on heparin drip.  - Check echo.  - Consult cardiology.     UTI  - Place on Rocephin and follow urine culture  - no nitrites in the urine - unlikely source   - consult ID - appreciate recs      Urinary retention  - Campbell catheter placed in the ER.  Monitor.      Hypertension  - Hold home medication of losartan with acute kidney injury.  - Continue home medications of metoprolol  mg daily and will hold amlodipine 10 mg daily his BP on the low normal side.       4 cm ascending thoracic aortic aneurysm found incidentally on CT scan  - Recommend follow-up with primary care provider and cardiothoracic surgery as an outpatient.     DVT prophylaxis  - Heparin drip.           GOMEZ Fuller  Attending Attestation:    Patient was seen and examined face to face, history and physical was taken personally at bedside the APRN-CNP, was present for the whole duration of the exam who participated in the documentation of this note. I performed the medical decision-making components (assessment and plan of care). I have reviewed the documentation and verified the findings in the note as written with additions or exceptions as stated in the body of this note.  Hypovolemic hypotonic hyponatremia secondary to vomiting and diarrhea and no p.o. intake for at least 5 to 6 days, patient also has acute kidney injury even though her creatinine slightly improved today, she was seen this morning awake alert oriented x 3, no headache, dizziness lightheadedness.  On exam no focal neurological deficit, heart regular lungs fairly clear, equal breath sound bilaterally, abdomen soft there is no tenderness bowel sounds are present.  Extremity no edema.  Will give another dose of 100 cc of hypertonic saline to go very slowly, after that increase normal saline to 125, strict I's and O's electrolytes the rest of electrolytes has been fairly stable, no significant urine output however patient is significantly behind fluid, she also developing sepsis with E. coli bacteremia, on Zosyn we will continue with that, hemodynamically stable at this point does not need to be on pressors, mentally stable, however patient is in critical condition secondary to severe hyponatremia, acute renal failure, E. coli  sepsis.    Dr. Isabella Walker MD  Internal Medicine            [1] [Held by provider] amLODIPine, 10 mg, oral, Daily  cholecalciferol, 25 mcg, oral, Daily  insulin lispro, 0-10 Units, subcutaneous, TID AC  [Held by provider] losartan, 100 mg, oral, Daily  metoprolol succinate XL, 100 mg, oral, Daily  piperacillin-tazobactam (Zosyn) 2.25 g in dextrose 5% 50 mL IV, 2.25 g, intravenous, q6h  [2] heparin, 0-4,500 Units/hr, Last Rate: 1,200 Units/hr (05/31/25 1007)  sodium chloride 0.9%, 125 mL/hr, Last Rate: 125 mL/hr (05/31/25 0919)  [3] PRN medications: acetaminophen **OR** acetaminophen **OR** acetaminophen, heparin

## 2025-05-31 NOTE — CONSULTS
Weakness, Gen  Associated symptoms include fatigue and a fever.     This is a consult requested on a patient who was admitted with nonspecific abdominal pain abnormal CT scan and severe electrolyte abnormalities.  The patient history dates back to approximately 6 days ago when she admitted to eating a sausage from [employer].  After this she began to feel unwell.  She did not have any obvious diarrhea.  She just had nonspecific abdominal discomfort.  She the following day developed fevers and chills she was noted to have significant sweats and apparently passed out after vomiting all over herself.  This is apparently documented by her son.  She then continued to feel moderately unwell she was able to take some oral liquids but not much.  She is not able to eat very much food.  She did not have real diarrhea.  She continued have some sweats and chills.  She eventually elected to come to the hospital where CT scan confirmed questionable inflammation around the cecum.  She has had a appendectomy many many years ago.  She is also noted to have significant hyponatremia with an elevated creatinine.  She also had a leukocytosis.  She was admitted to intensive care unit and has been treated with hypertonic saline she is made urine overnight and feels a little better.  Medical History[1]     Current Medications[2]     RX Allergies[3]     Review of Systems  Review of Systems   Constitutional:  Positive for appetite change, fatigue and fever.   All other systems reviewed and are negative.      Objective     Vital signs for last 24 hours:  Temp:  [36 °C (96.8 °F)-36.6 °C (97.9 °F)] 36.2 °C (97.2 °F)  Heart Rate:  [] 91  Resp:  [14-33] 24  BP: ()/(50-99) 110/60    Intake/Output this shift:  No intake/output data recorded.    Physical Exam  Physical Exam  Vitals reviewed. Exam conducted with a chaperone present.   Constitutional:       Appearance: Normal appearance.      Comments: Has dry mucous membranes.   HENT:       Head: Normocephalic.   Cardiovascular:      Rate and Rhythm: Normal rate and regular rhythm.      Heart sounds: Normal heart sounds.   Pulmonary:      Effort: Pulmonary effort is normal.      Breath sounds: Normal breath sounds.   Abdominal:      General: Abdomen is flat.      Palpations: Abdomen is soft. There is no mass.      Tenderness: There is no abdominal tenderness. There is no guarding.   Musculoskeletal:         General: Normal range of motion.   Skin:     General: Skin is warm.   Neurological:      General: No focal deficit present.   Psychiatric:         Mood and Affect: Mood normal.         Labs & Radiology      Labs Reviewed   BLOOD CULTURE - Abnormal       Result Value    Blood Culture        Value: Identification and susceptibility testing to follow    Gram Stain Gram negative bacilli (*)    BLOOD CULTURE - Abnormal    Blood Culture        Value: Identification and susceptibility testing to follow    Gram Stain Gram negative bacilli (*)    BCID-GN - Abnormal    Escherichia coli Detected (*)     Narrative:     Refer to the blood culture tests for any additional information on organism(s) and susceptibility testing.   CBC WITH AUTO DIFFERENTIAL - Abnormal    WBC 19.8 (*)     nRBC 0.0      RBC 3.51 (*)     Hemoglobin 10.8 (*)     Hematocrit 28.8 (*)     MCV 82      MCH 30.8      MCHC 37.5 (*)     RDW 13.8      Platelets 186      Neutrophils % 93.2      Immature Granulocytes %, Automated 2.1 (*)     Lymphocytes % 1.1      Monocytes % 3.1      Eosinophils % 0.1      Basophils % 0.4      Neutrophils Absolute 18.50 (*)     Immature Granulocytes Absolute, Automated 0.42      Lymphocytes Absolute 0.21 (*)     Monocytes Absolute 0.61      Eosinophils Absolute 0.01      Basophils Absolute 0.07     COMPREHENSIVE METABOLIC PANEL - Abnormal    Glucose 112 (*)     Sodium 109 (*)     Potassium 3.9      Chloride 76 (*)     Bicarbonate 20 (*)     Anion Gap 17      Urea Nitrogen 52 (*)     Creatinine 2.68 (*)     eGFR  18 (*)     Calcium 8.5 (*)     Albumin 3.3 (*)     Alkaline Phosphatase 178 (*)     Total Protein 6.9      AST 28      Bilirubin, Total 1.0      ALT 33     URINALYSIS WITH REFLEX CULTURE AND MICROSCOPIC - Abnormal    Color, Urine Light-Orange (*)     Appearance, Urine Turbid (*)     Specific Gravity, Urine 1.009      pH, Urine 6.0      Protein, Urine 100 (2+) (*)     Glucose, Urine Normal      Blood, Urine 0.5 (2+) (*)     Ketones, Urine NEGATIVE      Bilirubin, Urine NEGATIVE      Urobilinogen, Urine Normal      Nitrite, Urine NEGATIVE      Leukocyte Esterase, Urine 500 Nina/uL (*)    MICROSCOPIC ONLY, URINE - Abnormal    WBC, Urine >50 (*)     RBC, Urine 11-20 (*)     Squamous Epithelial Cells, Urine 1-9 (SPARSE)      Transitional Epithelial Cells, Urine 1-2 (FEW)      Bacteria, Urine 4+ (*)     Mucus, Urine FEW      Hyaline Casts, Urine 1+ (*)     Fine Granular Casts, Urine 1+ (*)    BASIC METABOLIC PANEL - Abnormal    Glucose 103 (*)     Sodium 109 (*)     Potassium 3.5      Chloride 79 (*)     Bicarbonate 19 (*)     Anion Gap 15      Urea Nitrogen 51 (*)     Creatinine 2.68 (*)     eGFR 18 (*)     Calcium 7.5 (*)    BASIC METABOLIC PANEL - Abnormal    Glucose 97      Sodium 109 (*)     Potassium 3.8      Chloride 79 (*)     Bicarbonate 20 (*)     Anion Gap 14      Urea Nitrogen 50 (*)     Creatinine 2.72 (*)     eGFR 18 (*)     Calcium 7.9 (*)    OSMOLALITY - Abnormal    Osmolality, Serum 251 (*)    CBC - Abnormal    WBC 18.8 (*)     nRBC 0.0      RBC 2.97 (*)     Hemoglobin 9.2 (*)     Hematocrit 24.0 (*)     MCV 81      MCH 31.0      MCHC 38.3 (*)     RDW 13.4      Platelets 155     BASIC METABOLIC PANEL - Abnormal    Glucose 360 (*)     Sodium 109 (*)     Potassium 3.3 (*)     Chloride 81 (*)     Bicarbonate 19 (*)     Anion Gap 12      Urea Nitrogen 50 (*)     Creatinine 2.78 (*)     eGFR 17 (*)     Calcium 6.6 (*)    BASIC METABOLIC PANEL - Abnormal    Glucose 98      Sodium 111 (*)     Potassium 3.4 (*)      Chloride 81 (*)     Bicarbonate 20 (*)     Anion Gap 13      Urea Nitrogen 52 (*)     Creatinine 2.95 (*)     eGFR 16 (*)     Calcium 7.3 (*)    LACTATE - Normal    Lactate 1.0      Narrative:     Venipuncture immediately after or during the administration of Metamizole may lead to falsely low results. Testing should be performed immediately prior to Metamizole dosing.   SARS-COV-2 AND INFLUENZA A/B PCR - Normal    Flu A Result Not Detected      Flu B Result Not Detected      Coronavirus 2019, PCR Not Detected      Narrative:     This assay is an FDA-cleared, in vitro diagnostic nucleic acid amplification test for the qualitative detection and differentiation of SARS CoV-2/ Influenza A/B from nasopharyngeal specimens collected from individuals with signs and symptoms of respiratory tract infections, and has been validated for use at [facility]. Negative results do not preclude COVID-19/ Influenza A/B infections and should not be used as the sole basis for diagnosis, treatment, or other management decisions. Testing for SARS CoV-2 is recommended only for patients who meet current clinical and/or epidemiological criteria defined by federal, state, or local public health directives.   OSMOLALITY, URINE - Normal    Osmolality, Urine Random 209     HEPARIN ASSAY - Normal    Heparin Unfractionated 0.3      Narrative:     The therapeutic reference range for UFH may be either 0.3-0.6 IU/mL or 0.3-0.7 IU/mL based on the clinical setting for anticoagulant therapy and the associated nomogram used. For Heparin dosing guidelines based on clinical scenario and Heparin Assay results, please refer to local Pharmacy and the [facility] Guidelines for Anticoagulation Therapy available on the [facility] intranet at: [email or web address]   HEPARIN ASSAY - Normal    Heparin Unfractionated 0.8      Narrative:     The therapeutic  reference range for UFH may be either 0.3-0.6 IU/mL or 0.3-0.7 IU/mL based on the clinical setting for anticoagulant therapy and the associated nomogram used. For Heparin dosing guidelines based on clinical scenario and Heparin Assay results, please refer to local Pharmacy and the [facility] Guidelines for Anticoagulation Therapy available on the [facility] intranet at: [email or web address]   URINE CULTURE   SODIUM, URINE RANDOM    Sodium, Urine Random 22      Creatinine, Urine Random 71.1      Sodium/Creatinine Ratio 31     URINALYSIS WITH REFLEX CULTURE AND MICROSCOPIC    Narrative:     The following orders were created for panel order Urinalysis with Reflex Culture and Microscopic.                  Procedure                               Abnormality         Status                                     ---------                               -----------         ------                                     Urinalysis with Reflex C...[[id number]]  Abnormal            Final result                               Extra Urine Gray Tube[[id number]]                                                                                         Please view results for these tests on the individual orders.   EXTRA URINE GRAY TUBE   OSMOLALITY, URINE   SODIUM, URINE RANDOM   CBC   RENAL FUNCTION PANEL   MAGNESIUM   TSH   CORTISOL AM   HEPARIN ASSAY   BASIC METABOLIC PANEL   BASIC METABOLIC PANEL   BASIC METABOLIC PANEL     Review of CT scan performed 5/30/2025    IMPRESSION:      Limited examination without intravenous contrast.      The appendix is not visualized, although right lower quadrant  inflammation and free fluid centered around the cecum raise concern  for possible acute appendicitis. No organized fluid collection.  Please correlate with right lower quadrant tenderness.      Multiple fluid-filled small bowel within the pelvis could  represent  nonspecific enteritis. No bowel obstruction.      Small left pleural effusion without definite evidence of pneumonia.      4 cm ascending thoracic aortic aneurysm.    Impression  Hypovolemic hyponatremia, severe dehydration, elevated creatinine, nonspecific colitis or enteritis.  Plan   Continue hypertonic saline until completed.  Recommend infusion of normal saline until correction of hypovolemic status and sodium.  Do not correct sodium more than 8 to 10 mEq per 24 hours.  Can have a full liquid diet.  No obvious evidence of surgical pathology within the abdomen.  Continue to follow the abdomen with serial exams.  Recommend nutritional supplementation with protein shakes.  Will follow patient         [1]   Past Medical History:  Diagnosis Date    Asymptomatic menopausal state 06/03/2022    Encounter for screening mammogram for malignant neoplasm of breast 01/12/2021    Seasonal allergies 08/19/2019   [2]   Current Facility-Administered Medications:     acetaminophen (Tylenol) tablet 650 mg, 650 mg, oral, q4h PRN, 650 mg at 05/31/25 0647 **OR** acetaminophen (Tylenol) oral liquid 650 mg, 650 mg, nasogastric tube, q4h PRN **OR** acetaminophen (Tylenol) suppository 650 mg, 650 mg, rectal, q4h PRN, Carlos Alberto Noel MD    [Held by provider] amLODIPine (Norvasc) tablet 10 mg, 10 mg, oral, Daily, Jung Esparza DO    heparin 25,000 Units in dextrose 5% 250 mL (100 Units/mL) infusion (premix), 0-4,500 Units/hr, intravenous, Continuous, Jung Esparza DO, Last Rate: 11 mL/hr at 05/31/25 0554, 1,100 Units/hr at 05/31/25 0554    heparin bolus from bag 2,000-4,000 Units, 2,000-4,000 Units, intravenous, q4h PRN, Jung Esparza DO    insulin lispro injection 0-10 Units, 0-10 Units, subcutaneous, TID AC, Clarisse Roldan APRN-CNP    [Held by provider] losartan (Cozaar) tablet 100 mg, 100 mg, oral, Daily, Jung Esparza DO    metoprolol succinate XL (Toprol-XL) 24 hr tablet 100 mg, 100 mg, oral, Daily, Jung sEparza DO     piperacillin-tazobactam (Zosyn) 2.25 g in dextrose (iso) IV 50 mL, 2.25 g, intravenous, q6h, ARNULFO Fuller-CNP    sodium chloride (Hypertonic) 3 % infusion, 25 mL/hr, intravenous, Once, Carlos Alberto Noel MD, Last Rate: 25 mL/hr at 05/31/25 0554, 25 mL/hr at 05/31/25 0554    sodium chloride 0.9 % bolus 500 mL, 500 mL, intravenous, Once, Carlos Alberto Noel MD, Last Rate: 500 mL/hr at 05/31/25 0625, 500 mL at 05/31/25 0625  [3] No Known Allergies

## 2025-05-31 NOTE — CONSULTS
CRITICAL CARE CONSULT      Date of Service 2025    Reason for consult: Hyponatremia / Enteritis / TAMMY / A-Fib      Hospital Day # 0      Mireya BREEZY Nava  Primary Care Physician: Makenzie Esparza MD      Subjective       HPI/Hospital Course   74F with HTN, anemia, detached retina s/p repair presented with nausea, vomiting, decreased appetite x 7 days.  Patient reports 7 days ago, she ate a sandwich with sausage, spinach and since then, she has had nausea and vomiting, decreased appetite.  Last BM 1-2 days ago. Reports associated chills, low UOP, poor appetite.  She denies chest pain, shortness of breath, blood in her urine, black or bloody stools, lower extremity edema.  CT AP with non-specific fluid filled SB ?enteritis.      > ongoing hypertonic 3% infusion @ 15, trending Na q4h, hep gtt ongoing for afib, beta blocker held 2/2 marginal BP, CARDS consult & ECHO PENDING, Surgery consult PENDING          Past history: reviewed as below    Medical History[1]  Surgical History[2]  Social History[3]  Family History[4]        Objective         Vitals for last 24 hours Temperature Ins/Outs Weight   Temp:  [36 °C (96.8 °F)-36.6 °C (97.9 °F)] 36.2 °C (97.2 °F)  Heart Rate:  [] 86  Resp:  [15-29] 22  BP: (105-140)/(57-99) 105/57 Temp (24hrs), Av.4 °C (97.5 °F), Min:36 °C (96.8 °F), Max:36.6 °C (97.9 °F)     Intake/Output Summary (Last 24 hours) at 2025  Last data filed at [address]  Gross per 24 hour   Intake 854.95 ml   Output 625 ml   Net 229.95 ml    Wt Readings from Last 7 Encounters:   25 68.9 kg (151 lb 14.4 oz)   25 68.5 kg (151 lb)   24 65.8 kg (145 lb)   06/10/24 66.7 kg (147 lb)   23 66.2 kg (146 lb)   23 66.5 kg (146 lb 9.6 oz)   23 64.9 kg (143 lb)    Body mass index is 26.07 kg/m².   Sats Hemodynamics Cumulative I/O Vent Settings   SpO2 Readings from Last 3 Encounters:   25 97%   25 96%   06/10/24 97%          [unfilled]    [facility]     Exam:    Review of Systems   Constitutional:  Negative for fatigue and fever.   Respiratory:  Negative for cough and shortness of breath.    Cardiovascular:  Negative for chest pain and leg swelling.   Gastrointestinal:  Negative for abdominal pain, diarrhea, nausea and vomiting.   Genitourinary:  Negative for dysuria.   Musculoskeletal:  Negative for back pain and joint swelling.   Neurological:  Negative for dizziness, weakness and headaches.   Psychiatric/Behavioral:  Negative for confusion. The patient is not nervous/anxious.          Physical Exam  Vitals reviewed.   Constitutional:       Appearance: She is ill-appearing.   HENT:      Head: Normocephalic.   Eyes:      Pupils: Pupils are equal, round, and reactive to light.   Cardiovascular:      Rate and Rhythm: Normal rate and regular rhythm.   Pulmonary:      Breath sounds: No wheezing, rhonchi or rales.      Comments: Coarse BS b/l  Abdominal:      General: There is no distension.      Palpations: Abdomen is soft.      Tenderness: There is no abdominal tenderness. There is no guarding.   Skin:     General: Skin is warm.      Capillary Refill: Capillary refill takes less than 2 seconds.   Neurological:      General: No focal deficit present.      Mental Status: She is alert. She is disoriented.      Cranial Nerves: No cranial nerve deficit.      Motor: No weakness.             Drains Urethral Catheter 16 Fr. (Active)   Present on Admission to Healthcare Facility N 05/30/25 1707   Collection Container Standard drainage bag 05/30/25 1707   Securement Method Securing device (Describe) 05/30/25 1707   Reason for Continuing Urinary Catheterization acute urinary retention 05/30/25 1707   Output (mL) 125 mL 05/30/25 2000   Number of days: 0          Medications     Scheduled Meds:  Scheduled Medications[5]    Continuous Infusions:  Continuous Medications[6]      Laboratory Data     ID/Cultures:    Date Result Date  Result   Blood 5/30 NGTD    "  Respiratory       Urine 5/30 NGTD     C. Diff       Flu/RSV/COVID 5/30 NEG     Other    No results found for: \"HIV1X2\"  No results found for: \"VZ4UPDJP\"       Hematology  Results from last 7 days   Lab Units 05/30/25  1119   WBC AUTO x10*3/uL 19.8*   RBC AUTO x10*6/uL 3.51*   HEMOGLOBIN g/dL 10.8*   HEMATOCRIT % 28.8*   PLATELETS AUTO x10*3/uL 186       Chemistry         Results from last 7 days   Lab Units 05/30/25  1818 05/30/25  1642 05/30/25  1119   SODIUM mmol/L 109* 109* 109*   POTASSIUM mmol/L 3.8 3.5 3.9   CHLORIDE mmol/L 79* 79* 76*   CO2 mmol/L 20* 19* 20*   BUN mg/dL 50* 51* 52*   CREATININE mg/dL 2.72* 2.68* 2.68*   CALCIUM mg/dL 7.9* 7.5* 8.5*   ALBUMIN g/dL  --   --  3.3*   PROTEIN TOTAL g/dL  --   --  6.9   BILIRUBIN TOTAL mg/dL  --   --  1.0   ALT U/L  --   --  33   AST U/L  --   --  28     Results from last 7 days   Lab Units 05/30/25  1215   LACTATE mmol/L 1.0     No lab exists for component: \"SCVO2\"      Blood Gases        No lab exists for component: \"ISJ6EITKE\", \"OF0YPSDP\", \"BEDEFICIT\"       Data/Imaging     The following data have been personally reviewed by me on 5/30/2025 and are summarized below:     Date Result   EKG/Tele  No results found for this or any previous visit (from the past 4464 hours).     Echo  No echocardiogram results found for the past 12 months   CXR       CT  === 05/30/25 ===    CT CHEST ABDOMEN PELVIS WO CONTRAST    - Impression -  Limited examination without intravenous contrast.    The appendix is not visualized, although right lower quadrant  inflammation and free fluid centered around the cecum raise concern  for possible acute appendicitis. No organized fluid collection.  Please correlate with right lower quadrant tenderness.    Multiple fluid-filled small bowel within the pelvis could represent  nonspecific enteritis. No bowel obstruction.    Small left pleural effusion without definite evidence of pneumonia.    4 cm ascending thoracic aortic " aneurysm.    MACRO  Shanel Valencia discussed the significance and urgency of this  critical finding by Epic secure chat with  DARA WELCH on 5/30/2025  at 2:03 pm.  (**-RCF-**) Findings:  See findings.    Signed by: Shanel Valencia 5/30/2025 2:05 PM  Dictation workstation:   [id number]       Other     PFT's         Assessment/Plan     Problem List[7]    Neuro: GCS 13-14, neuro exams q2hrs, consider STAT CT head PRN for acute AMS, no sedative gtts  Cards: continuous cardiac monitoring, vasopressors PRN to maintain MAP > 65, 2D echo PENDING, trend BNP PRN, diuretics as tolerated   Pulm: continuous pulse oximetry, supplemental O2 PRN, maintain SpO2 > 92%, trend CXR/ABG PRN; nebs, inhaled corticosteroids PRN  FEN/GI: diet per RD/SLP recommendations, trend prealbumin qweekly, bowel regimen, replete electrolytes PRN keep K > 4, Mg > 2, Phos > 3  Renal: strict I/Os, trend BUN/Cr, consider renal consult for TAMMY/hyponatremia management, 3% completed, 1/2 NS infusion ongoing, trend Na q4h  ID: trend fever curve, CBC; pan cx NGTD, empiric abx ongoing, pan cx if febrile  Endo: insulin sliding scale PRN, hemoglobin A1c 5.6  Heme: trend and transfuse PRN to keep hgb > 7, plts > 50, INR < 1.8  Prophylaxis: SCDs, PPI  Dispo: critically ill, continue ICU care, Full Code, PT/OT    Attestation    Care was discussed with multidisciplinary staff.  I am located in [place] and the patient is located in [place].  After verbal consent, I have personally examined this critically ill patient at bedside (with the assistance of nursing staff via secured HIPAA-complaint Tele-Medicine cart), reviewed the medical record, flow sheets, laboratory data, and pertinent imaging studies.  Critical care time does not overlap with any other provider.  My personal critical care time spent does not include procedures and/or teaching.    Total Critical Care Time Spent (excluding procedures):  44 minutes.    ___________________________________  Carlos Alberto Noel,  MD, FACS  Attending, Trauma/Critical Care  [facility]         [1]   Past Medical History:  Diagnosis Date    Asymptomatic menopausal state 06/03/2022    Encounter for screening mammogram for malignant neoplasm of breast 01/12/2021    Seasonal allergies 08/19/2019   [2] History reviewed. No pertinent surgical history.  [3]   Social History  Socioeconomic History    Marital status:    Tobacco Use    Smoking status: Never    Smokeless tobacco: Never   Substance and Sexual Activity    Alcohol use: Not Currently     Comment: rare    Drug use: Never     Social Drivers of Health     Financial Resource Strain: Low Risk  (5/30/2025)    Overall Financial Resource Strain (CARDIA)     Difficulty of Paying Living Expenses: Not hard at all   Food Insecurity: No Food Insecurity (5/30/2025)    Hunger Vital Sign     Worried About Running Out of Food in the Last Year: Never true     Ran Out of Food in the Last Year: Never true   Transportation Needs: No Transportation Needs (5/30/2025)    PRAPARE - Transportation     Lack of Transportation (Medical): No     Lack of Transportation (Non-Medical): No   Physical Activity: Sufficiently Active (5/30/2025)    Exercise Vital Sign     Days of Exercise per Week: 7 days     Minutes of Exercise per Session: 60 min   Stress: No Stress Concern Present (5/30/2025)    [employer] [employer] - Occupational Stress Questionnaire     Feeling of Stress : Only a little   Social Connections: Socially Isolated (5/30/2025)    Social Connection and Isolation Panel [NHANES]     Frequency of Communication with Friends and Family: More than three times a week     Frequency of Social Gatherings with Friends and Family: More than three times a week     Attends Rastafarian Services: Never     Active Member of Clubs or Organizations: No     Attends Club or Organization Meetings: Never     Marital Status:    Intimate Partner Violence: Not At Risk (5/30/2025)    Humiliation,  Afraid, Rape, and Kick questionnaire     Fear of Current or Ex-Partner: No     Emotionally Abused: No     Physically Abused: No     Sexually Abused: No   Housing Stability: Low Risk  (5/30/2025)    Housing Stability Vital Sign     Unable to Pay for Housing in the Last Year: No     Number of Times Moved in the Last Year: 0     Homeless in the Last Year: No   [4] No family history on file.  [5] [Held by provider] amLODIPine, 10 mg, oral, Daily  cefTRIAXone, 1 g, intravenous, q24h  [Held by provider] losartan, 100 mg, oral, Daily  metoprolol succinate XL, 100 mg, oral, Daily  sodium chloride, 15 mL/hr, intravenous, Once  [6] heparin, 0-4,500 Units/hr, Last Rate: 1,200 Units/hr (05/30/25 2132)  [7]   Patient Active Problem List  Diagnosis    Primary hypertension    Hyperlipidemia    Slow transit constipation    Overweight with body mass index (BMI) 25.0-29.9    Chronic cough    Aortic valve regurgitation    Polymyalgia rheumatica (Multi)    Anemia    Adjustment disorder, unspecified    Generalized weakness

## 2025-05-31 NOTE — PROGRESS NOTES
Physical Therapy                 Therapy Communication Note    Patient Name: Mireya Nava  MRN: [id number]  Department: [facility]  Room: 01/01-A  Today's Date: 5/31/2025     Discipline: Physical Therapy      Comment:  Pt evaluation held today per nursing due to patient's medical condition not being appropriate for PT today.

## 2025-05-31 NOTE — CARE PLAN
"The patient's goals for the shift include \"get some sleep\"    The clinical goals for the shift include patient will tolerate hypertonic sodium infusion this shift    Over the shift, BMP's have been obtained Q4H per orders. Sodium levels increasing and decreasing, patient has a poor urinary output and encouraged to drink fluids. Bladder scan shown 2ml of urine in bladder. Campbell intact and patent. Urine is clear and light yellow in color. Patient advanced to a regular diet. Patient received x 2 hypertonic boluses this shift. Patient's IV infusion increased to 150ml/hr. Heparin drip infusing at 13 ml/hr. Last assay therapeutic, next assay due at 2030. Patient's neuro checks are WNLs. Due to worsening kidney function, Clarisse Roldan CNP placed a transfer order in for nephrology.     "

## 2025-05-31 NOTE — PROGRESS NOTES
Occupational Therapy                 Therapy Communication Note    Patient Name: Mireya Nava  MRN: [id number]  Department: [facility]  Room: 01/01-A  Today's Date: 5/31/2025     Discipline: Occupational Therapy    Missed Visit: Per nurse, patient not appropriate this date, pt on medical hold.

## 2025-05-31 NOTE — SIGNIFICANT EVENT
Discussed case with Dr. Bauer nephrology [facility] - reviewed labs and assessment of pt - will continue to hydrate with NS and monitor bmp closely as well as urine output, mental status etc.

## 2025-06-01 ENCOUNTER — APPOINTMENT (OUTPATIENT)
Dept: RADIOLOGY | Facility: HOSPITAL | Age: 75
End: 2025-06-01
Payer: MEDICARE

## 2025-06-01 PROBLEM — N17.9 AKI (ACUTE KIDNEY INJURY): Status: ACTIVE | Noted: 2025-06-01

## 2025-06-01 PROBLEM — N39.0 UTI (URINARY TRACT INFECTION): Status: ACTIVE | Noted: 2025-06-01

## 2025-06-01 PROBLEM — I48.91 ATRIAL FIBRILLATION (MULTI): Status: ACTIVE | Noted: 2025-06-01

## 2025-06-01 PROBLEM — R33.8 ACUTE URINARY RETENTION: Status: ACTIVE | Noted: 2025-06-01

## 2025-06-01 LAB
ALBUMIN SERPL BCP-MCNC: 2.2 G/DL (ref 3.4–5)
ALP SERPL-CCNC: 137 U/L (ref 33–136)
ALT SERPL W P-5'-P-CCNC: 16 U/L (ref 7–45)
ANION GAP SERPL CALC-SCNC: 13 MMOL/L (ref 10–20)
ANION GAP SERPL CALC-SCNC: 13 MMOL/L (ref 10–20)
ANION GAP SERPL CALC-SCNC: 14 MMOL/L (ref 10–20)
ANION GAP SERPL CALC-SCNC: 15 MMOL/L (ref 10–20)
ANION GAP SERPL CALC-SCNC: 15 MMOL/L (ref 10–20)
AST SERPL W P-5'-P-CCNC: 9 U/L (ref 9–39)
BACTERIA BLD AEROBE CULT: ABNORMAL
BACTERIA BLD AEROBE CULT: ABNORMAL
BACTERIA BLD CULT: ABNORMAL
BACTERIA BLD CULT: ABNORMAL
BACTERIA UR CULT: ABNORMAL
BILIRUB SERPL-MCNC: 0.6 MG/DL (ref 0–1.2)
BNP SERPL-MCNC: 1057 PG/ML (ref 0–99)
BUN SERPL-MCNC: 51 MG/DL (ref 6–23)
BUN SERPL-MCNC: 52 MG/DL (ref 6–23)
BUN SERPL-MCNC: 53 MG/DL (ref 6–23)
BUN SERPL-MCNC: 54 MG/DL (ref 6–23)
BUN SERPL-MCNC: 55 MG/DL (ref 6–23)
CALCIUM SERPL-MCNC: 6.7 MG/DL (ref 8.6–10.3)
CALCIUM SERPL-MCNC: 6.9 MG/DL (ref 8.6–10.3)
CALCIUM SERPL-MCNC: 7.2 MG/DL (ref 8.6–10.3)
CALCIUM SERPL-MCNC: 7.2 MG/DL (ref 8.6–10.3)
CALCIUM SERPL-MCNC: 7.3 MG/DL (ref 8.6–10.3)
CHLORIDE SERPL-SCNC: 85 MMOL/L (ref 98–107)
CHLORIDE SERPL-SCNC: 86 MMOL/L (ref 98–107)
CO2 SERPL-SCNC: 16 MMOL/L (ref 21–32)
CO2 SERPL-SCNC: 16 MMOL/L (ref 21–32)
CO2 SERPL-SCNC: 17 MMOL/L (ref 21–32)
CO2 SERPL-SCNC: 18 MMOL/L (ref 21–32)
CO2 SERPL-SCNC: 18 MMOL/L (ref 21–32)
CREAT SERPL-MCNC: 2.98 MG/DL (ref 0.5–1.05)
CREAT SERPL-MCNC: 3 MG/DL (ref 0.5–1.05)
CREAT SERPL-MCNC: 3.07 MG/DL (ref 0.5–1.05)
CREAT SERPL-MCNC: 3.08 MG/DL (ref 0.5–1.05)
CREAT SERPL-MCNC: 3.18 MG/DL (ref 0.5–1.05)
EGFRCR SERPLBLD CKD-EPI 2021: 15 ML/MIN/1.73M*2
EGFRCR SERPLBLD CKD-EPI 2021: 16 ML/MIN/1.73M*2
EGFRCR SERPLBLD CKD-EPI 2021: 16 ML/MIN/1.73M*2
ERYTHROCYTE [DISTWIDTH] IN BLOOD BY AUTOMATED COUNT: 14 % (ref 11.5–14.5)
EST. AVERAGE GLUCOSE BLD GHB EST-MCNC: 117 MG/DL
GLUCOSE BLD MANUAL STRIP-MCNC: 101 MG/DL (ref 74–99)
GLUCOSE BLD MANUAL STRIP-MCNC: 103 MG/DL (ref 74–99)
GLUCOSE BLD MANUAL STRIP-MCNC: 106 MG/DL (ref 74–99)
GLUCOSE SERPL-MCNC: 109 MG/DL (ref 74–99)
GLUCOSE SERPL-MCNC: 110 MG/DL (ref 74–99)
GLUCOSE SERPL-MCNC: 111 MG/DL (ref 74–99)
GLUCOSE SERPL-MCNC: 113 MG/DL (ref 74–99)
GLUCOSE SERPL-MCNC: 98 MG/DL (ref 74–99)
GRAM STN SPEC: ABNORMAL
HBA1C MFR BLD: 5.7 % (ref ?–5.7)
HCT VFR BLD AUTO: 21.5 % (ref 36–46)
HGB BLD-MCNC: 8.2 G/DL (ref 12–16)
MCH RBC QN AUTO: 30.9 PG (ref 26–34)
MCHC RBC AUTO-ENTMCNC: 38.1 G/DL (ref 32–36)
MCV RBC AUTO: 81 FL (ref 80–100)
NRBC BLD-RTO: 0 /100 WBCS (ref 0–0)
PLATELET # BLD AUTO: 158 X10*3/UL (ref 150–450)
POTASSIUM SERPL-SCNC: 3.7 MMOL/L (ref 3.5–5.3)
POTASSIUM SERPL-SCNC: 3.7 MMOL/L (ref 3.5–5.3)
POTASSIUM SERPL-SCNC: 3.8 MMOL/L (ref 3.5–5.3)
POTASSIUM SERPL-SCNC: 3.8 MMOL/L (ref 3.5–5.3)
POTASSIUM SERPL-SCNC: 4 MMOL/L (ref 3.5–5.3)
PROT SERPL-MCNC: 4.8 G/DL (ref 6.4–8.2)
RBC # BLD AUTO: 2.65 X10*6/UL (ref 4–5.2)
SODIUM SERPL-SCNC: 112 MMOL/L (ref 136–145)
SODIUM SERPL-SCNC: 112 MMOL/L (ref 136–145)
SODIUM SERPL-SCNC: 113 MMOL/L (ref 136–145)
SODIUM SERPL-SCNC: 113 MMOL/L (ref 136–145)
SODIUM SERPL-SCNC: 114 MMOL/L (ref 136–145)
UFH PPP CHRO-ACNC: 0.1 IU/ML (ref ?–1.1)
UFH PPP CHRO-ACNC: 0.2 IU/ML (ref ?–1.1)
UFH PPP CHRO-ACNC: 0.3 IU/ML (ref ?–1.1)
WBC # BLD AUTO: 19.9 X10*3/UL (ref 4.4–11.3)

## 2025-06-01 PROCEDURE — 71045 X-RAY EXAM CHEST 1 VIEW: CPT | Performed by: RADIOLOGY

## 2025-06-01 PROCEDURE — 99233 SBSQ HOSP IP/OBS HIGH 50: CPT | Performed by: NURSE PRACTITIONER

## 2025-06-01 PROCEDURE — 80048 BASIC METABOLIC PNL TOTAL CA: CPT | Mod: CCI,IPSPLIT | Performed by: NURSE PRACTITIONER

## 2025-06-01 PROCEDURE — 2500000004 HC RX 250 GENERAL PHARMACY W/ HCPCS (ALT 636 FOR OP/ED): Mod: JZ,IPSPLIT

## 2025-06-01 PROCEDURE — 2500000001 HC RX 250 WO HCPCS SELF ADMINISTERED DRUGS (ALT 637 FOR MEDICARE OP): Mod: IPSPLIT | Performed by: NURSE PRACTITIONER

## 2025-06-01 PROCEDURE — 71045 X-RAY EXAM CHEST 1 VIEW: CPT | Mod: IPSPLIT

## 2025-06-01 PROCEDURE — 2500000004 HC RX 250 GENERAL PHARMACY W/ HCPCS (ALT 636 FOR OP/ED): Mod: JZ,IPSPLIT | Performed by: SURGERY

## 2025-06-01 PROCEDURE — 83880 ASSAY OF NATRIURETIC PEPTIDE: CPT | Mod: IPSPLIT | Performed by: SURGERY

## 2025-06-01 PROCEDURE — 2500000001 HC RX 250 WO HCPCS SELF ADMINISTERED DRUGS (ALT 637 FOR MEDICARE OP): Mod: IPSPLIT | Performed by: INTERNAL MEDICINE

## 2025-06-01 PROCEDURE — 36415 COLL VENOUS BLD VENIPUNCTURE: CPT | Mod: IPSPLIT | Performed by: INTERNAL MEDICINE

## 2025-06-01 PROCEDURE — 2500000002 HC RX 250 W HCPCS SELF ADMINISTERED DRUGS (ALT 637 FOR MEDICARE OP, ALT 636 FOR OP/ED): Mod: IPSPLIT

## 2025-06-01 PROCEDURE — 2500000004 HC RX 250 GENERAL PHARMACY W/ HCPCS (ALT 636 FOR OP/ED): Mod: IPSPLIT | Performed by: NURSE PRACTITIONER

## 2025-06-01 PROCEDURE — 2500000001 HC RX 250 WO HCPCS SELF ADMINISTERED DRUGS (ALT 637 FOR MEDICARE OP): Mod: IPSPLIT | Performed by: SURGERY

## 2025-06-01 PROCEDURE — 85520 HEPARIN ASSAY: CPT | Mod: IPSPLIT | Performed by: NURSE PRACTITIONER

## 2025-06-01 PROCEDURE — 2500000004 HC RX 250 GENERAL PHARMACY W/ HCPCS (ALT 636 FOR OP/ED): Mod: JZ,IPSPLIT | Performed by: INTERNAL MEDICINE

## 2025-06-01 PROCEDURE — 2500000005 HC RX 250 GENERAL PHARMACY W/O HCPCS: Mod: IPSPLIT | Performed by: SURGERY

## 2025-06-01 PROCEDURE — 94760 N-INVAS EAR/PLS OXIMETRY 1: CPT | Mod: IPSPLIT

## 2025-06-01 PROCEDURE — 82947 ASSAY GLUCOSE BLOOD QUANT: CPT | Mod: IPSPLIT

## 2025-06-01 PROCEDURE — 80048 BASIC METABOLIC PNL TOTAL CA: CPT | Mod: IPSPLIT | Performed by: NURSE PRACTITIONER

## 2025-06-01 PROCEDURE — 99232 SBSQ HOSP IP/OBS MODERATE 35: CPT | Performed by: SURGERY

## 2025-06-01 PROCEDURE — 2020000001 HC ICU ROOM DAILY: Mod: IPSPLIT

## 2025-06-01 PROCEDURE — 80053 COMPREHEN METABOLIC PANEL: CPT | Mod: IPSPLIT | Performed by: NURSE PRACTITIONER

## 2025-06-01 PROCEDURE — 85027 COMPLETE CBC AUTOMATED: CPT | Mod: IPSPLIT | Performed by: INTERNAL MEDICINE

## 2025-06-01 PROCEDURE — 94640 AIRWAY INHALATION TREATMENT: CPT | Mod: IPSPLIT

## 2025-06-01 PROCEDURE — 9420000001 HC RT PATIENT EDUCATION 5 MIN: Mod: IPSPLIT

## 2025-06-01 PROCEDURE — 85520 HEPARIN ASSAY: CPT | Mod: IPSPLIT | Performed by: INTERNAL MEDICINE

## 2025-06-01 RX ORDER — SODIUM CHLORIDE 9 MG/ML
50 INJECTION, SOLUTION INTRAVENOUS CONTINUOUS
Status: ACTIVE | OUTPATIENT
Start: 2025-06-01 | End: 2025-06-02

## 2025-06-01 RX ORDER — IPRATROPIUM BROMIDE AND ALBUTEROL SULFATE 2.5; .5 MG/3ML; MG/3ML
3 SOLUTION RESPIRATORY (INHALATION) 4 TIMES DAILY PRN
Status: DISCONTINUED | OUTPATIENT
Start: 2025-06-01 | End: 2025-06-09 | Stop reason: HOSPADM

## 2025-06-01 RX ORDER — FUROSEMIDE 10 MG/ML
20 INJECTION INTRAMUSCULAR; INTRAVENOUS ONCE
Status: COMPLETED | OUTPATIENT
Start: 2025-06-01 | End: 2025-06-01

## 2025-06-01 RX ORDER — IPRATROPIUM BROMIDE AND ALBUTEROL SULFATE 2.5; .5 MG/3ML; MG/3ML
SOLUTION RESPIRATORY (INHALATION)
Status: COMPLETED
Start: 2025-06-01 | End: 2025-06-01

## 2025-06-01 RX ORDER — PIPERACILLIN SODIUM, TAZOBACTAM SODIUM 2; .25 G/10ML; G/10ML
INJECTION, POWDER, LYOPHILIZED, FOR SOLUTION INTRAVENOUS
Status: COMPLETED
Start: 2025-06-01 | End: 2025-06-01

## 2025-06-01 RX ORDER — BUDESONIDE 0.5 MG/2ML
0.5 INHALANT ORAL EVERY 12 HOURS
Status: DISCONTINUED | OUTPATIENT
Start: 2025-06-01 | End: 2025-06-01

## 2025-06-01 RX ORDER — BUDESONIDE 0.5 MG/2ML
INHALANT ORAL
Status: COMPLETED
Start: 2025-06-01 | End: 2025-06-01

## 2025-06-01 RX ADMIN — IPRATROPIUM BROMIDE AND ALBUTEROL SULFATE 3 ML: .5; 3 SOLUTION RESPIRATORY (INHALATION) at 00:27

## 2025-06-01 RX ADMIN — FUROSEMIDE 20 MG: 10 INJECTION, SOLUTION INTRAVENOUS at 01:35

## 2025-06-01 RX ADMIN — ACETAMINOPHEN 650 MG: 325 TABLET, FILM COATED ORAL at 17:12

## 2025-06-01 RX ADMIN — BUDESONIDE 0.5 MG: 0.5 INHALANT ORAL at 00:26

## 2025-06-01 RX ADMIN — APIXABAN 2.5 MG: 2.5 TABLET, FILM COATED ORAL at 21:24

## 2025-06-01 RX ADMIN — HEPARIN SODIUM 14 UNITS/HR: 10000 INJECTION, SOLUTION INTRAVENOUS at 05:10

## 2025-06-01 RX ADMIN — PIPERACILLIN AND TAZOBACTAM 2250 MG: 2; .25 INJECTION, POWDER, FOR SOLUTION INTRAVENOUS at 21:22

## 2025-06-01 RX ADMIN — IPRATROPIUM BROMIDE AND ALBUTEROL SULFATE 3 ML: 2.5; .5 SOLUTION RESPIRATORY (INHALATION) at 00:27

## 2025-06-01 RX ADMIN — Medication 2 L/MIN: at 00:27

## 2025-06-01 RX ADMIN — Medication 25 MCG: at 08:42

## 2025-06-01 RX ADMIN — PIPERACILLIN AND TAZOBACTAM 2.25 G: 2; .25 INJECTION, POWDER, FOR SOLUTION INTRAVENOUS at 08:42

## 2025-06-01 RX ADMIN — METOPROLOL SUCCINATE 100 MG: 100 TABLET, EXTENDED RELEASE ORAL at 08:42

## 2025-06-01 RX ADMIN — APIXABAN 2.5 MG: 2.5 TABLET, FILM COATED ORAL at 11:36

## 2025-06-01 RX ADMIN — BUDESONIDE 0.5 MG: 0.5 INHALANT RESPIRATORY (INHALATION) at 00:26

## 2025-06-01 RX ADMIN — MORPHINE SULFATE 1 MG: 2 INJECTION, SOLUTION INTRAMUSCULAR; INTRAVENOUS at 19:15

## 2025-06-01 RX ADMIN — PIPERACILLIN AND TAZOBACTAM 2.25 G: 2; .25 INJECTION, POWDER, FOR SOLUTION INTRAVENOUS at 15:44

## 2025-06-01 RX ADMIN — PIPERACILLIN AND TAZOBACTAM 2.25 G: 2; .25 INJECTION, POWDER, FOR SOLUTION INTRAVENOUS at 02:37

## 2025-06-01 ASSESSMENT — PAIN SCALES - GENERAL
PAINLEVEL_OUTOF10: 3
PAINLEVEL_OUTOF10: 3
PAINLEVEL_OUTOF10: 0 - NO PAIN
PAINLEVEL_OUTOF10: 0 - NO PAIN
PAINLEVEL_OUTOF10: 2
PAINLEVEL_OUTOF10: 3
PAINLEVEL_OUTOF10: 3
PAINLEVEL_OUTOF10: 0 - NO PAIN

## 2025-06-01 ASSESSMENT — PAIN - FUNCTIONAL ASSESSMENT
PAIN_FUNCTIONAL_ASSESSMENT: 0-10

## 2025-06-01 ASSESSMENT — ENCOUNTER SYMPTOMS
FEVER: 0
ABDOMINAL PAIN: 0
NAUSEA: 1
VOMITING: 1
CHILLS: 1

## 2025-06-01 ASSESSMENT — PAIN DESCRIPTION - DESCRIPTORS
DESCRIPTORS: ACHING
DESCRIPTORS: ACHING

## 2025-06-01 ASSESSMENT — PAIN DESCRIPTION - LOCATION: LOCATION: BACK

## 2025-06-01 NOTE — CARE PLAN
"The patient's goals for the shift include \"get some sleep\"    The clinical goals for the shift include Pt will have improved electrolytes and creatine level by end of shift AEB morning labs. Pt will remain HDS and free from s/s of hyponatremia.      "

## 2025-06-01 NOTE — CARE PLAN
"The patient's goals for the shift include \"get some sleep\"    The clinical goals for the shift include Patient not need oxygen this shift    Over the shift, patient weaned off oxygen, continues with IV fluids and IV antibiotics, and bridged to PO eliquis. Patient's urinary output has improved. Patient continues with frequent BMP draws. See sodium levels.     "

## 2025-06-01 NOTE — NURSING NOTE
0700: assumed care of patient  0730: Patient sating at 100% on 2L of oxygen via nasal cannula, O2 removed. Patient tolerating well, see flowsheet   0815: Sandy Danielle CNP at bedside for rounds   0847: Patient continues with Q4H BMP levels. Sodium level increased to 114. BUN and creatinine continue to climb. Snady Danielle CNP aware of same.   0916: Heparin assay at 0.1, bolus given and heparin drip increased to 1500units/hr per nomogram. See MAR for changes. Patient tolerating well.  1052: RT steroid treatments discontinued at this time. Lungs clear to auscultation at this time.   1132: Verified with Sandy Danielle CNP, okay to get patient up to chair for meals. Neuro status intact, sodium levels and urine output improving. Patient to bridge to PO Eliquis, stop heparin drip two hours after administration of eliquis per Sandy.

## 2025-06-01 NOTE — PROGRESS NOTES
CRITICAL CARE PROGRESS NOTE      Date of Service 2025    Reason for consult: Hyponatremia / Enteritis / TAMMY / A-Fib       Hospital Day # 1      Mireya BREEZY Nava  Primary Care Physician: Makenzie Esparza MD      Subjective       HPI/Hospital Course   74F with HTN, anemia, detached retina s/p repair presented with nausea, vomiting, decreased appetite x 7 days.  Patient reports 7 days ago, she ate a sandwich with sausage, spinach and since then, she has had nausea and vomiting, decreased appetite.  Last BM 1-2 days ago. Reports associated chills, low UOP, poor appetite.  She denies chest pain, shortness of breath, blood in her urine, black or bloody stools, lower extremity edema.  CT AP with non-specific fluid filled SB ?enteritis.       > worsening TAMMY > pending transfer for nephrology consult, Na improving slowly, trending Na q4h, powell in place > good UOP, A-Fib rate controlled > metoprolol restarted, hep gtt ongoing, surgery following > non-op management, blood cx +E.COLI, 1L bolus given   > ongoing hypertonic 3% infusion @ 15, trending Na q4h, hep gtt ongoing for afib, beta blocker held 2/2 marginal BP, CARDS consult & ECHO PENDING, Surgery consult PENDING          Past history: reviewed as below    Medical History[1]  Surgical History[2]  Social History[3]  Family History[4]        Objective         Vitals for last 24 hours Temperature Ins/Outs Weight   Temp:  [36.5 °C (97.7 °F)-36.6 °C (97.9 °F)] 36.5 °C (97.7 °F)  Heart Rate:  [61-99] 84  Resp:  [14-33] 21  BP: ()/(50-72) 106/60 Temp (24hrs), Av.6 °C (97.8 °F), Min:36.5 °C (97.7 °F), Max:36.6 °C (97.9 °F)     Intake/Output Summary (Last 24 hours) at 2025  Last data filed at 2025  Gross per 24 hour   Intake 3513.55 ml   Output 745 ml   Net 2768.55 ml    Wt Readings from Last 7 Encounters:   25 71.7 kg (158 lb 1.1 oz)   25 68.5 kg (151 lb)   24 65.8 kg (145 lb)   06/10/24 66.7 kg (147 lb)   23  "66.2 kg (146 lb)   08/18/23 66.5 kg (146 lb 9.6 oz)   06/05/23 64.9 kg (143 lb)    Body mass index is 27.13 kg/m².   Sats Hemodynamics Cumulative I/O Vent Settings   SpO2 Readings from Last 3 Encounters:   05/31/25 94%   05/27/25 96%   06/10/24 97%          [unfilled]   @[facility]@     Exam:    Review of Systems      Physical Exam        Drains Urethral Catheter 16 Fr. (Active)   Present on Admission to Healthcare Facility N 05/30/25 1707   Site Assessment Clean;Skin intact 05/31/25 1644   Collection Container Standard drainage bag 05/31/25 0600   Securement Method Securing device (Describe) 05/31/25 0600   Reason for Continuing Urinary Catheterization acute urinary retention 05/31/25 0828   Output (mL) 10 mL 05/31/25 1900   Number of days: 1          Medications     Scheduled Meds:  Scheduled Medications[5]    Continuous Infusions:  Continuous Medications[6]      Laboratory Data     ID/Cultures:    Date Result Date  Result   Blood 5/30 +E.COLI     Respiratory       Urine 5/30 NGTD     C. Diff       Flu/RSV/COVID 5/30 NEG     Other    No results found for: \"HIV1X2\"  No results found for: \"SK7IKAHY\"       Hematology  Results from last 7 days   Lab Units 05/31/25  0721 05/30/25  2325 05/30/25  1119   WBC AUTO x10*3/uL 19.3* 18.8* 19.8*   RBC AUTO x10*6/uL 2.67* 2.97* 3.51*   HEMOGLOBIN g/dL 8.2* 9.2* 10.8*   HEMATOCRIT % 21.9* 24.0* 28.8*   PLATELETS AUTO x10*3/uL 151 155 186       Chemistry         Results from last 7 days   Lab Units 05/31/25  2005 05/31/25  1610 05/31/25  1219 05/31/25  0721 05/30/25  1642 05/30/25  1119   SODIUM mmol/L 114* 110* 112* 112*  112*   < > 109*   POTASSIUM mmol/L 3.9 3.7 4.0 3.6  3.6   < > 3.9   CHLORIDE mmol/L 86* 84* 83* 85*  85*   < > 76*   CO2 mmol/L 17* 17* 18* 18*  18*   < > 20*   BUN mg/dL 54* 51* 52* 51*  51*   < > 52*   CREATININE mg/dL 3.19* 2.88* 2.98* 2.81*  2.81*   < > 2.68*   CALCIUM mg/dL 6.8* 7.1* 7.4* 6.9*  6.9*   < > 8.5*   ALBUMIN g/dL  --   --   -- " " 2.2*  --  3.3*   PROTEIN TOTAL g/dL  --   --   --   --   --  6.9   BILIRUBIN TOTAL mg/dL  --   --   --   --   --  1.0   PHOSPHORUS mg/dL  --   --   --  2.5  --   --    ALT U/L  --   --   --   --   --  33   AST U/L  --   --   --   --   --  28    < > = values in this interval not displayed.     Results from last 7 days   Lab Units 05/30/25  1215   LACTATE mmol/L 1.0     No lab exists for component: \"SCVO2\"      Blood Gases        No lab exists for component: \"DBL6TOKDG\", \"GD5UDPWB\", \"BEDEFICIT\"       Data/Imaging     The following data have been personally reviewed by me on 5/31/2025 and are summarized below:     Date Result   EKG/Tele  No results found for this or any previous visit (from the past 4464 hours).     Echo  No echocardiogram results found for the past 12 months   CXR       CT  === 05/30/25 ===    CT CHEST ABDOMEN PELVIS WO CONTRAST    - Impression -  Limited examination without intravenous contrast.    The appendix is not visualized, although right lower quadrant  inflammation and free fluid centered around the cecum raise concern  for possible acute appendicitis. No organized fluid collection.  Please correlate with right lower quadrant tenderness.    Multiple fluid-filled small bowel within the pelvis could represent  nonspecific enteritis. No bowel obstruction.    Small left pleural effusion without definite evidence of pneumonia.    4 cm ascending thoracic aortic aneurysm.    MACRO  Shanel Valencia discussed the significance and urgency of this  critical finding by Epic secure chat with  DARA WELCH on 5/30/2025  at 2:03 pm.  (**-RCF-**) Findings:  See findings.    Signed by: Shanel Valencia 5/30/2025 2:05 PM  Dictation workstation:   [id number]       Other     PFT's         Assessment/Plan     Problem List[7]    Neuro: GCS 15, neuro exams q2hrs, consider STAT CT head PRN for acute AMS, no sedative gtts  Cards: continuous cardiac monitoring, vasopressors PRN to maintain MAP > 65, 2D echo PENDING, " trend BNP PRN, diuretics as tolerated   Pulm: continuous pulse oximetry, supplemental O2 PRN, maintain SpO2 > 92%, trend CXR/ABG PRN; nebs, inhaled corticosteroids PRN  FEN/GI: diet per RD/SLP recommendations, trend prealbumin qweekly, bowel regimen, replete electrolytes PRN keep K > 4, Mg > 2, Phos > 3  Renal: strict I/Os, trend BUN/Cr, transfer pending for renal consult, 3% completed, NS infusion ongoing, trend Na q4h  ID: trend fever curve, CBC; blood cx +E.COLI, IV abx ongoing, repeat blood cx until neg, consider ID consult  Endo: insulin sliding scale PRN, hemoglobin A1c 5.6  Heme: trend and transfuse PRN to keep hgb > 7, plts > 50, INR < 1.8  Prophylaxis: SCDs, PPI  Dispo: critically ill, continue ICU care, Full Code, PT/OT    Attestation    Care was discussed with multidisciplinary staff.  I am located in [place] and the patient is located in [place].  After verbal consent, I have personally examined this critically ill patient at bedside (with the assistance of nursing staff via secured HIPAA-complaint Tele-Medicine cart), reviewed the medical record, flow sheets, laboratory data, and pertinent imaging studies.  Critical care time does not overlap with any other provider.  My personal critical care time spent does not include procedures and/or teaching.    Total Critical Care Time Spent (excluding procedures):  43 minutes.    ___________________________________  Carlos Alberto Noel MD, FACS  Attending, Trauma/Critical Care  [facility]         [1]   Past Medical History:  Diagnosis Date    Asymptomatic menopausal state 06/03/2022    Encounter for screening mammogram for malignant neoplasm of breast 01/12/2021    Seasonal allergies 08/19/2019   [2] History reviewed. No pertinent surgical history.  [3]   Social History  Socioeconomic History    Marital status:    Tobacco Use    Smoking status: Never    Smokeless tobacco: Never   Substance and Sexual Activity    Alcohol use: Not Currently     Comment:  rare    Drug use: Never     Social Drivers of Health     Financial Resource Strain: Low Risk  (5/30/2025)    Overall Financial Resource Strain (CARDIA)     Difficulty of Paying Living Expenses: Not hard at all   Food Insecurity: No Food Insecurity (5/30/2025)    Hunger Vital Sign     Worried About Running Out of Food in the Last Year: Never true     Ran Out of Food in the Last Year: Never true   Transportation Needs: No Transportation Needs (5/30/2025)    PRAPARE - Transportation     Lack of Transportation (Medical): No     Lack of Transportation (Non-Medical): No   Physical Activity: Sufficiently Active (5/30/2025)    Exercise Vital Sign     Days of Exercise per Week: 7 days     Minutes of Exercise per Session: 60 min   Stress: No Stress Concern Present (5/30/2025)    [employer] - Occupational Stress Questionnaire     Feeling of Stress : Only a little   Social Connections: Socially Isolated (5/30/2025)    Social Connection and Isolation Panel [NHANES]     Frequency of Communication with Friends and Family: More than three times a week     Frequency of Social Gatherings with Friends and Family: More than three times a week     Attends Jewish Services: Never     Active Member of Clubs or Organizations: No     Attends Club or Organization Meetings: Never     Marital Status:    Intimate Partner Violence: Not At Risk (5/30/2025)    Humiliation, Afraid, Rape, and Kick questionnaire     Fear of Current or Ex-Partner: No     Emotionally Abused: No     Physically Abused: No     Sexually Abused: No   Housing Stability: Low Risk  (5/30/2025)    Housing Stability Vital Sign     Unable to Pay for Housing in the Last Year: No     Number of Times Moved in the Last Year: 0     Homeless in the Last Year: No   [4] No family history on file.  [5] [Held by provider] amLODIPine, 10 mg, oral, Daily  cholecalciferol, 25 mcg, oral, Daily  insulin lispro, 0-10 Units, subcutaneous, TID AC  [Held by  provider] losartan, 100 mg, oral, Daily  metoprolol succinate XL, 100 mg, oral, Daily  piperacillin-tazobactam (Zosyn) 2.25 g in dextrose 5% 50 mL IV, 2.25 g, intravenous, q6h  [6] heparin, 0-4,500 Units/hr, Last Rate: 1,300 Units/hr (05/31/25 1325)  sodium chloride 0.9%, 200 mL/hr, Last Rate: 200 mL/hr (05/31/25 1957)  [7]   Patient Active Problem List  Diagnosis    Primary hypertension    Hyperlipidemia    Slow transit constipation    Overweight with body mass index (BMI) 25.0-29.9    Chronic cough    Aortic valve regurgitation    Polymyalgia rheumatica (Multi)    Anemia    Adjustment disorder, unspecified    Generalized weakness    Hypo-osmolar hyponatremia

## 2025-06-01 NOTE — PROGRESS NOTES
Mireya Nava is a 74 y.o. female on day 2 of admission presenting with Generalized weakness.    Subjective   She is resting in bed this morning, states she feels better but still has a lot of weakness.  Her appetite is improving.  We discussed her kidney function, sodium levels and atrial fibrillation.  She received IV fluids overnight and her sodium levels are slowly improving.  She denies dizziness at this time.  Will continue to monitor her labs and cardiac function.  All questions answered.     Objective     Physical Exam  Constitutional:       General: She is not in acute distress.     Appearance: Normal appearance. She is not toxic-appearing.   HENT:      Head: Normocephalic and atraumatic.      Mouth/Throat:      Mouth: Mucous membranes are moist.   Eyes:      Extraocular Movements: Extraocular movements intact.      Pupils: Pupils are equal, round, and reactive to light.   Cardiovascular:      Rate and Rhythm: Normal rate. Rhythm irregular.      Pulses: Normal pulses.      Heart sounds: Normal heart sounds. No murmur heard.     No gallop.   Pulmonary:      Effort: Pulmonary effort is normal. No respiratory distress.      Breath sounds: Normal breath sounds. No wheezing, rhonchi or rales.   Abdominal:      General: Bowel sounds are normal. There is no distension.      Palpations: Abdomen is soft.      Tenderness: There is no abdominal tenderness. There is no guarding or rebound.   Musculoskeletal:         General: No swelling, tenderness, deformity or signs of injury. Normal range of motion.      Cervical back: Normal range of motion and neck supple.   Skin:     General: Skin is warm and dry.      Capillary Refill: Capillary refill takes less than 2 seconds.      Coloration: Skin is not jaundiced.      Findings: No bruising or rash.   Neurological:      General: No focal deficit present.      Mental Status: She is alert and oriented to person, place, and time. Mental status is at baseline.      Cranial  "Nerves: No cranial nerve deficit.      Sensory: No sensory deficit.      Motor: No weakness.      Gait: Gait normal.   Psychiatric:         Mood and Affect: Mood normal.         Behavior: Behavior normal.         Thought Content: Thought content normal.         Judgment: Judgment normal.         Last Recorded Vitals  Blood pressure 109/62, pulse 75, temperature 36.5 °C (97.7 °F), temperature source Temporal, resp. rate 19, height 1.626 m (5' 4\"), weight 78.2 kg (172 lb 6.4 oz), SpO2 96%.  Intake/Output last 3 Shifts:  I/O last 3 completed shifts:  In: 8088.2 (103.4 mL/kg) [P.O.:1984; I.V.:4405.3 (56.3 mL/kg); IV Piggyback:1698.9]  Out: 1285 (16.4 mL/kg) [Urine:1285 (0.5 mL/kg/hr)]  Weight: 78.2 kg     Scheduled medications  Scheduled Medications[1]  Continuous medications  Continuous Medications[2]  PRN medications  PRN Medications[3]    Relevant Results  XR chest 1 view  Result Date: 6/1/2025  1.  Cardiomegaly. Mild interstitial edema. Small left pleural effusion with airspace opacity at the left lung base, may be secondary to atelectasis or pneumonia.       MACRO: None.   Signed by: Myrtle Duque 6/1/2025 3:24 AM Dictation workstation:   [id number]    CT chest abdomen pelvis wo IV contrast  Result Date: 5/30/2025  Limited examination without intravenous contrast.   The appendix is not visualized, although right lower quadrant inflammation and free fluid centered around the cecum raise concern for possible acute appendicitis. No organized fluid collection. Please correlate with right lower quadrant tenderness.   Multiple fluid-filled small bowel within the pelvis could represent nonspecific enteritis. No bowel obstruction.   Small left pleural effusion without definite evidence of pneumonia.   4 cm ascending thoracic aortic aneurysm.   MACRO Shanel Valencia discussed the significance and urgency of this critical finding by Epic secure chat with  DARA WELCH on 5/30/2025 at 2:03 pm.  (**-RCF-**) Findings:  See " findings.   Signed by: Shanel Valencia 5/30/2025 2:05 PM Dictation workstation:   [id number]     Latest Reference Range & Units 05/31/25 20:05 06/01/25 00:18 06/01/25 04:17 06/01/25 08:27   GLUCOSE 74 - 99 mg/dL 114 (H) 109 (H) 113 (H) 98   SODIUM 136 - 145 mmol/L 114 (LL) 113 (LL) 112 (LL) 114 (LL)   POTASSIUM 3.5 - 5.3 mmol/L 3.9 3.8 3.7 3.7   CHLORIDE 98 - 107 mmol/L 86 (L) 86 (L) 86 (L) 86 (L)   Bicarbonate 21 - 32 mmol/L 17 (L) 18 (L) 17 (L) 18 (L)   Anion Gap 10 - 20 mmol/L 15 13 13 14   Blood Urea Nitrogen 6 - 23 mg/dL 54 (H) 51 (H) 54 (H) 52 (H)   Creatinine 0.50 - 1.05 mg/dL 3.19 (H) 2.98 (H) 3.08 (H) 3.07 (H)   EGFR >60 mL/min/1.73m*2 15 (L) 16 (L) 15 (L) 15 (L)   Calcium 8.6 - 10.3 mg/dL 6.8 (L) 6.7 (L) 6.9 (L) 7.2 (L)   Albumin 3.4 - 5.0 g/dL   2.2 (L)    Alkaline Phosphatase 33 - 136 U/L   137 (H)    ALT 7 - 45 U/L   16    AST 9 - 39 U/L   9    Bilirubin Total 0.0 - 1.2 mg/dL   0.6    Total Protein 6.4 - 8.2 g/dL   4.8 (L)    BNP 0 - 99 pg/mL   1,057 (H)       Latest Reference Range & Units 06/01/25 04:17   WBC 4.4 - 11.3 x10*3/uL 19.9 (H)   nRBC 0.0 - 0.0 /100 WBCs 0.0   RBC 4.00 - 5.20 x10*6/uL 2.65 (L)   HEMOGLOBIN 12.0 - 16.0 g/dL 8.2 (L)   HEMATOCRIT 36.0 - 46.0 % 21.5 (L)   MCV 80 - 100 fL 81   MCH 26.0 - 34.0 pg 30.9   MCHC 32.0 - 36.0 g/dL 38.1 (H)   RED CELL DISTRIBUTION WIDTH 11.5 - 14.5 % 14.0   Platelets 150 - 450 x10*3/uL 158     Assessment & Plan  Primary hypertension    Hyperlipidemia    Hypo-osmolar hyponatremia    TAMMY (acute kidney injury)    Atrial fibrillation (Multi)    UTI (urinary tract infection)    Acute urinary retention      Severe hypo-osmolar hyponatremia  - Given IV fluids with normal saline in the ER of 1 L LR and repeat sodium is unchanged at 109.  - 5/30 serum osmol 251  - TSH 0.58  - urine Na 22 - consistent with hypovolemic hyponatremia, likely secondary to dehydration based on her history.  - Patient is not on any obvious home medications to cause hyponatremia.  -  Sodium was 130 on 5/30/2024.  - 3% hypertonic saline at 15 mL/h for 4 hours x 2  - Na 109>111>112>114  - ICU monitoring  - Patient is alert and oriented to self, birthday, president and year and appears mentating well.  She does have some generalized weakness but no obvious gross focal neurologic deficits.  - cortisol 28.5  - Goal to achieve a 24-hour increase in serum sodium of 4 to 6 mEq/L.   - Placed on seizure precautions.  - Consult critical care.  - Q6 BMP  - transfer if renal function worsening     Acute kidney injury  - Likely secondary to dehydration with decreased oral intake.  - urine creat 71.1 > 34.8  - urine sodium 23, Fena calculated showing likely pre-renal cause  - Patient given 1 L of IV fluids in the ER.  As above, will place on hypertonic saline at 15 mL/h for 4 hours and check BMPs every 4 hours (complete)  - 5/31 received D5 .45 overnight - bolus 100ml 3% NS this am and then 0.9NaCl @125ml/hr and monitor q4hr BMP - making urine approx. 50ml/hr - encourage po intake  - CT scan of the abdomen pelvis with no signs of hydronephrosis, but patient apparently had some urinary retention, Campbell catheter was placed.  - Hold home medication of losartan with acute kidney injury.  - Monitor.     Nonspecific enteritis  - Dr. Garner contacted by the ER provider for a consult, appreciate recs, feels no surgical need at this time   - Will place on clinical diet at this time and monitor.  - WBC 19.8>18.8>19.3>19.9  - blood culture  gram (-) neg bacilli aerobic and anaerobic bottles - change to zosyn     New onset atrial fibrillation  - Patient is on metoprolol  mg at home, which we will continue.  - Place on heparin drip.  - Check echo.  - Consult cardiology, appreciate recs     UTI  - Place on Rocephin and follow urine culture, changed to zosyn  - no nitrites in the urine - unlikely source   - consult ID, appreciate recs      Urinary retention  - Campbell catheter placed in the ER.    - strict IO      Hypertension  - Hold home medication of losartan with acute kidney injury.  - Continue home medications of metoprolol  mg daily and will hold amlodipine 10 mg daily his BP on the low normal side.       - 4 cm ascending thoracic aortic aneurysm found incidentally on CT scan  - Recommend follow-up with primary care provider and cardiothoracic surgery as an outpatient.     GI ppx: PPI  DVT ppx: heparin gtt  Fluids: 50 ml/hr  Electrolytes: replace as needed  Nutrition: reg diet  Adjuncts: PIV  Code Status: full  Pt requires inpatient stay at this time.    Sandy Danielle, APRN-CNP         [1] [Held by provider] amLODIPine, 10 mg, oral, Daily  budesonide, 0.5 mg, nebulization, q12h  cholecalciferol, 25 mcg, oral, Daily  insulin lispro, 0-10 Units, subcutaneous, TID AC  [Held by provider] losartan, 100 mg, oral, Daily  metoprolol succinate XL, 100 mg, oral, Daily  piperacillin-tazobactam (Zosyn) 2.25 g in dextrose 5% 50 mL IV, 2.25 g, intravenous, q6h  [2] heparin, 0-4,500 Units/hr, Last Rate: 1,500 Units/hr (06/01/25 0916)  sodium chloride 0.9%, 50 mL/hr, Last Rate: 50 mL/hr (06/01/25 0638)  [3] PRN medications: acetaminophen **OR** [DISCONTINUED] acetaminophen **OR** [DISCONTINUED] acetaminophen, dextromethorphan-guaifenesin, heparin, ipratropium-albuteroL, morphine, oxygen

## 2025-06-01 NOTE — PROGRESS NOTES
"Mireya Nava is a 74 y.o. female on day 2 of admission presenting with Generalized weakness.  Hyponatremia and abnormal CT scan.  Subjective   Overnight had some difficulty breathing.  Now being fluid restricted.  Had flash pulm edema per nurse.  In A-fib.  Creatinine worse.  Not having any abdominal pain       Objective     Physical Exam  Constitutional:       Appearance: Normal appearance.   Abdominal:      Palpations: Abdomen is soft. There is no mass.      Tenderness: There is no abdominal tenderness. There is no guarding.         Last Recorded Vitals  Blood pressure 106/61, pulse 61, temperature 36.4 °C (97.5 °F), temperature source Temporal, resp. rate 14, height 1.626 m (5' 4\"), weight 78.2 kg (172 lb 6.4 oz), SpO2 99%.  Intake/Output last 3 Shifts:  I/O last 3 completed shifts:  In: 4255.6 (59.4 mL/kg) [P.O.:1164; I.V.:2641.6 (36.8 mL/kg); IV Piggyback:450]  Out: 1360 (19 mL/kg) [Urine:1360 (0.5 mL/kg/hr)]  Weight: 71.7 kg     Relevant Results  ontains critical data Comprehensive Metabolic Panel  Order: [id number]   Status: Final result    Test Result Released: No (inaccessible in [facility])    0 Result Notes            Component  Ref Range & Units 04:17  (6/1/25) 00:18  (6/1/25) 1 d ago  (5/31/25) 1 d ago  (5/31/25) 1 d ago  (5/31/25) 1 d ago  (5/31/25) 1 d ago  (5/31/25)   Glucose  74 - 99 mg/dL 113 High  109 High  114 High  118 High  101 High  127 High  127 High    Sodium  136 - 145 mmol/L 112 Low Panic  113 Low Panic  114 Low Panic  110 Low Panic  112 Low Panic  112 Low Panic  112 Low Panic    Potassium  3.5 - 5.3 mmol/L 3.7 3.8 3.9 3.7 4.0 3.6 3.6   Chloride  98 - 107 mmol/L 86 Low  86 Low  86 Low  84 Low  83 Low  85 Low  85 Low    Bicarbonate  21 - 32 mmol/L 17 Low  18 Low  17 Low  17 Low  18 Low  18 Low  18 Low    Anion Gap  10 - 20 mmol/L 13 13 15 13 15 13 13   Urea Nitrogen  6 - 23 mg/dL 54 High  51 High  54 High  51 High  52 High  51 High  51 High    Creatinine  0.50 - 1.05 mg/dL 3.08 High  " 2.98 High  3.19 High  2.88 High  2.98 High  2.81 High  2.81 High    eGFR  >60 mL/min/1.73m*2 15 Low  16 Low  CM 15 Low  CM 16 Low  CM 16 Low  CM 17 Low  CM 17 Low  CM        Assessment & Plan  Generalized weakness    Primary hypertension    Hyperlipidemia    Hypo-osmolar hyponatremia    Plan-no abdominal pathology at this time.  Does have atrial fibrillation, hypovolemic hyponatremia, acute renal injury.  Will require likely nephrology input would recommend transfer to tertiary care center for management of worsening kidney function      Leon Garner MD

## 2025-06-02 ENCOUNTER — APPOINTMENT (OUTPATIENT)
Dept: CARDIOLOGY | Facility: HOSPITAL | Age: 75
End: 2025-06-02
Payer: MEDICARE

## 2025-06-02 ENCOUNTER — APPOINTMENT (OUTPATIENT)
Dept: RADIOLOGY | Facility: HOSPITAL | Age: 75
DRG: 871 | End: 2025-06-02
Payer: MEDICARE

## 2025-06-02 ENCOUNTER — APPOINTMENT (OUTPATIENT)
Dept: CARDIOLOGY | Facility: HOSPITAL | Age: 75
DRG: 871 | End: 2025-06-02
Payer: MEDICARE

## 2025-06-02 VITALS
HEART RATE: 62 BPM | DIASTOLIC BLOOD PRESSURE: 64 MMHG | BODY MASS INDEX: 29.43 KG/M2 | TEMPERATURE: 96.8 F | OXYGEN SATURATION: 96 % | SYSTOLIC BLOOD PRESSURE: 110 MMHG | HEIGHT: 64 IN | WEIGHT: 172.4 LBS | RESPIRATION RATE: 18 BRPM

## 2025-06-02 LAB
ALBUMIN SERPL BCP-MCNC: 2.2 G/DL (ref 3.4–5)
ALP SERPL-CCNC: 175 U/L (ref 33–136)
ALT SERPL W P-5'-P-CCNC: 15 U/L (ref 7–45)
ANION GAP SERPL CALC-SCNC: 13 MMOL/L (ref 10–20)
ANION GAP SERPL CALC-SCNC: 15 MMOL/L (ref 10–20)
ANION GAP SERPL CALC-SCNC: 15 MMOL/L (ref 10–20)
ANION GAP SERPL CALC-SCNC: 17 MMOL/L (ref 10–20)
AORTIC VALVE PEAK VELOCITY: 1.8 M/S
AST SERPL W P-5'-P-CCNC: 11 U/L (ref 9–39)
ATRIAL RATE: 69 BPM
AV PEAK GRADIENT: 13 MMHG
AVA (PEAK VEL): 1.95 CM2
BACTERIA BLD AEROBE CULT: ABNORMAL
BACTERIA BLD CULT: ABNORMAL
BACTERIA UR CULT: ABNORMAL
BILIRUB SERPL-MCNC: 0.7 MG/DL (ref 0–1.2)
BNP SERPL-MCNC: 1041 PG/ML (ref 0–99)
BUN SERPL-MCNC: 54 MG/DL (ref 6–23)
BUN SERPL-MCNC: 54 MG/DL (ref 6–23)
BUN SERPL-MCNC: 56 MG/DL (ref 6–23)
BUN SERPL-MCNC: 57 MG/DL (ref 6–23)
CALCIUM SERPL-MCNC: 6.9 MG/DL (ref 8.6–10.3)
CALCIUM SERPL-MCNC: 7.2 MG/DL (ref 8.6–10.3)
CALCIUM SERPL-MCNC: 7.6 MG/DL (ref 8.6–10.3)
CALCIUM SERPL-MCNC: 7.8 MG/DL (ref 8.6–10.3)
CHLORIDE SERPL-SCNC: 87 MMOL/L (ref 98–107)
CHLORIDE SERPL-SCNC: 87 MMOL/L (ref 98–107)
CHLORIDE SERPL-SCNC: 88 MMOL/L (ref 98–107)
CHLORIDE SERPL-SCNC: 88 MMOL/L (ref 98–107)
CO2 SERPL-SCNC: 15 MMOL/L (ref 21–32)
CO2 SERPL-SCNC: 16 MMOL/L (ref 21–32)
CO2 SERPL-SCNC: 17 MMOL/L (ref 21–32)
CO2 SERPL-SCNC: 18 MMOL/L (ref 21–32)
CREAT SERPL-MCNC: 3.21 MG/DL (ref 0.5–1.05)
CREAT SERPL-MCNC: 3.23 MG/DL (ref 0.5–1.05)
CREAT SERPL-MCNC: 3.24 MG/DL (ref 0.5–1.05)
CREAT SERPL-MCNC: 3.25 MG/DL (ref 0.5–1.05)
EGFRCR SERPLBLD CKD-EPI 2021: 14 ML/MIN/1.73M*2
EJECTION FRACTION APICAL 4 CHAMBER: 70.8
EJECTION FRACTION: 68 %
ERYTHROCYTE [DISTWIDTH] IN BLOOD BY AUTOMATED COUNT: 14.6 % (ref 11.5–14.5)
GLUCOSE BLD MANUAL STRIP-MCNC: 100 MG/DL (ref 74–99)
GLUCOSE BLD MANUAL STRIP-MCNC: 85 MG/DL (ref 74–99)
GLUCOSE BLD MANUAL STRIP-MCNC: 86 MG/DL (ref 74–99)
GLUCOSE SERPL-MCNC: 104 MG/DL (ref 74–99)
GLUCOSE SERPL-MCNC: 84 MG/DL (ref 74–99)
GLUCOSE SERPL-MCNC: 86 MG/DL (ref 74–99)
GLUCOSE SERPL-MCNC: 95 MG/DL (ref 74–99)
GRAM STN SPEC: ABNORMAL
GRAM STN SPEC: ABNORMAL
HCT VFR BLD AUTO: 22 % (ref 36–46)
HGB BLD-MCNC: 8.2 G/DL (ref 12–16)
HOLD SPECIMEN: NORMAL
LEFT ATRIUM VOLUME AREA LENGTH INDEX BSA: 33.1 ML/M2
LEFT VENTRICLE INTERNAL DIMENSION DIASTOLE: 3.69 CM (ref 3.5–6)
LEFT VENTRICULAR OUTFLOW TRACT DIAMETER: 2 CM
MCH RBC QN AUTO: 30.6 PG (ref 26–34)
MCHC RBC AUTO-ENTMCNC: 37.3 G/DL (ref 32–36)
MCV RBC AUTO: 82 FL (ref 80–100)
NRBC BLD-RTO: 0 /100 WBCS (ref 0–0)
P AXIS: 49 DEGREES
P OFFSET: 171 MS
P ONSET: 122 MS
PLATELET # BLD AUTO: 185 X10*3/UL (ref 150–450)
POTASSIUM SERPL-SCNC: 3.7 MMOL/L (ref 3.5–5.3)
POTASSIUM SERPL-SCNC: 3.8 MMOL/L (ref 3.5–5.3)
POTASSIUM SERPL-SCNC: 3.9 MMOL/L (ref 3.5–5.3)
POTASSIUM SERPL-SCNC: 4 MMOL/L (ref 3.5–5.3)
PR INTERVAL: 186 MS
PROT SERPL-MCNC: 5 G/DL (ref 6.4–8.2)
Q ONSET: 215 MS
Q ONSET: 217 MS
QRS COUNT: 11 BEATS
QRS COUNT: 16 BEATS
QRS DURATION: 90 MS
QRS DURATION: 92 MS
QT INTERVAL: 322 MS
QT INTERVAL: 404 MS
QTC CALCULATION(BAZETT): 404 MS
QTC CALCULATION(BAZETT): 432 MS
QTC FREDERICIA: 375 MS
QTC FREDERICIA: 423 MS
R AXIS: 14 DEGREES
R AXIS: 8 DEGREES
RBC # BLD AUTO: 2.68 X10*6/UL (ref 4–5.2)
RIGHT VENTRICLE FREE WALL PEAK S': 13.3 CM/S
RIGHT VENTRICLE PEAK SYSTOLIC PRESSURE: 30 MMHG
SODIUM SERPL-SCNC: 113 MMOL/L (ref 136–145)
SODIUM SERPL-SCNC: 114 MMOL/L (ref 136–145)
SODIUM SERPL-SCNC: 116 MMOL/L (ref 136–145)
SODIUM SERPL-SCNC: 117 MMOL/L (ref 136–145)
T AXIS: 29 DEGREES
T AXIS: 34 DEGREES
T OFFSET: 378 MS
T OFFSET: 417 MS
TRICUSPID ANNULAR PLANE SYSTOLIC EXCURSION: 2.4 CM
VENTRICULAR RATE: 69 BPM
VENTRICULAR RATE: 95 BPM
WBC # BLD AUTO: 18.6 X10*3/UL (ref 4.4–11.3)

## 2025-06-02 PROCEDURE — 2500000004 HC RX 250 GENERAL PHARMACY W/ HCPCS (ALT 636 FOR OP/ED): Mod: JZ,IPSPLIT | Performed by: SURGERY

## 2025-06-02 PROCEDURE — 80048 BASIC METABOLIC PNL TOTAL CA: CPT | Mod: CCI,IPSPLIT | Performed by: NURSE PRACTITIONER

## 2025-06-02 PROCEDURE — 2500000004 HC RX 250 GENERAL PHARMACY W/ HCPCS (ALT 636 FOR OP/ED): Mod: IPSPLIT | Performed by: NURSE PRACTITIONER

## 2025-06-02 PROCEDURE — 93010 ELECTROCARDIOGRAM REPORT: CPT | Performed by: INTERNAL MEDICINE

## 2025-06-02 PROCEDURE — 82947 ASSAY GLUCOSE BLOOD QUANT: CPT | Mod: IPSPLIT

## 2025-06-02 PROCEDURE — 2500000001 HC RX 250 WO HCPCS SELF ADMINISTERED DRUGS (ALT 637 FOR MEDICARE OP): Mod: IPSPLIT | Performed by: NURSE PRACTITIONER

## 2025-06-02 PROCEDURE — 74018 RADEX ABDOMEN 1 VIEW: CPT | Mod: IPSPLIT

## 2025-06-02 PROCEDURE — 94760 N-INVAS EAR/PLS OXIMETRY 1: CPT | Mod: IPSPLIT

## 2025-06-02 PROCEDURE — 93005 ELECTROCARDIOGRAM TRACING: CPT | Mod: IPSPLIT

## 2025-06-02 PROCEDURE — 74018 RADEX ABDOMEN 1 VIEW: CPT | Performed by: STUDENT IN AN ORGANIZED HEALTH CARE EDUCATION/TRAINING PROGRAM

## 2025-06-02 PROCEDURE — 93306 TTE W/DOPPLER COMPLETE: CPT | Mod: IPSPLIT

## 2025-06-02 PROCEDURE — 97161 PT EVAL LOW COMPLEX 20 MIN: CPT | Mod: GP,IPSPLIT | Performed by: PHYSICAL THERAPIST

## 2025-06-02 PROCEDURE — 93306 TTE W/DOPPLER COMPLETE: CPT | Performed by: INTERNAL MEDICINE

## 2025-06-02 PROCEDURE — 97165 OT EVAL LOW COMPLEX 30 MIN: CPT | Mod: GO,IPSPLIT | Performed by: OCCUPATIONAL THERAPIST

## 2025-06-02 PROCEDURE — 99232 SBSQ HOSP IP/OBS MODERATE 35: CPT | Performed by: NURSE PRACTITIONER

## 2025-06-02 PROCEDURE — 83880 ASSAY OF NATRIURETIC PEPTIDE: CPT | Mod: IPSPLIT | Performed by: NURSE PRACTITIONER

## 2025-06-02 PROCEDURE — 36415 COLL VENOUS BLD VENIPUNCTURE: CPT | Mod: IPSPLIT | Performed by: NURSE PRACTITIONER

## 2025-06-02 PROCEDURE — 2500000004 HC RX 250 GENERAL PHARMACY W/ HCPCS (ALT 636 FOR OP/ED): Mod: JZ,IPSPLIT | Performed by: NURSE PRACTITIONER

## 2025-06-02 PROCEDURE — 99233 SBSQ HOSP IP/OBS HIGH 50: CPT | Performed by: NURSE PRACTITIONER

## 2025-06-02 PROCEDURE — 2020000001 HC ICU ROOM DAILY: Mod: IPSPLIT

## 2025-06-02 PROCEDURE — 2500000004 HC RX 250 GENERAL PHARMACY W/ HCPCS (ALT 636 FOR OP/ED): Mod: IPSPLIT | Performed by: HOSPITALIST

## 2025-06-02 PROCEDURE — 84075 ASSAY ALKALINE PHOSPHATASE: CPT | Mod: IPSPLIT | Performed by: NURSE PRACTITIONER

## 2025-06-02 PROCEDURE — 80048 BASIC METABOLIC PNL TOTAL CA: CPT | Mod: IPSPLIT | Performed by: NURSE PRACTITIONER

## 2025-06-02 PROCEDURE — 85027 COMPLETE CBC AUTOMATED: CPT | Mod: IPSPLIT | Performed by: NURSE PRACTITIONER

## 2025-06-02 PROCEDURE — 2500000001 HC RX 250 WO HCPCS SELF ADMINISTERED DRUGS (ALT 637 FOR MEDICARE OP): Mod: IPSPLIT | Performed by: INTERNAL MEDICINE

## 2025-06-02 RX ORDER — SODIUM BICARBONATE 650 MG/1
650 TABLET ORAL 3 TIMES DAILY
Status: DISCONTINUED | OUTPATIENT
Start: 2025-06-02 | End: 2025-06-09 | Stop reason: HOSPADM

## 2025-06-02 RX ORDER — AMOXICILLIN AND CLAVULANATE POTASSIUM 500; 125 MG/1; MG/1
1 TABLET, FILM COATED ORAL 2 TIMES DAILY
Status: DISCONTINUED | OUTPATIENT
Start: 2025-06-02 | End: 2025-06-07

## 2025-06-02 RX ORDER — ADHESIVE BANDAGE
30 BANDAGE TOPICAL DAILY PRN
Status: DISCONTINUED | OUTPATIENT
Start: 2025-06-02 | End: 2025-06-02 | Stop reason: CLARIF

## 2025-06-02 RX ORDER — AMOXICILLIN 250 MG
2 CAPSULE ORAL 2 TIMES DAILY
Status: DISCONTINUED | OUTPATIENT
Start: 2025-06-02 | End: 2025-06-09 | Stop reason: HOSPADM

## 2025-06-02 RX ORDER — SODIUM CHLORIDE 9 MG/ML
50 INJECTION, SOLUTION INTRAVENOUS CONTINUOUS
Status: DISCONTINUED | OUTPATIENT
Start: 2025-06-02 | End: 2025-06-02

## 2025-06-02 RX ORDER — SODIUM CHLORIDE 9 MG/ML
10 INJECTION, SOLUTION INTRAVENOUS CONTINUOUS PRN
Status: DISCONTINUED | OUTPATIENT
Start: 2025-06-02 | End: 2025-06-09 | Stop reason: HOSPADM

## 2025-06-02 RX ORDER — OXYCODONE HYDROCHLORIDE 5 MG/1
5 TABLET ORAL EVERY 4 HOURS PRN
Refills: 0 | Status: DISCONTINUED | OUTPATIENT
Start: 2025-06-02 | End: 2025-06-09 | Stop reason: HOSPADM

## 2025-06-02 RX ADMIN — AMOXICILLIN AND CLAVULANATE POTASSIUM 1 TABLET: 500; 125 TABLET, FILM COATED ORAL at 13:08

## 2025-06-02 RX ADMIN — SENNOSIDES AND DOCUSATE SODIUM 2 TABLET: 50; 8.6 TABLET ORAL at 21:48

## 2025-06-02 RX ADMIN — METOPROLOL SUCCINATE 100 MG: 100 TABLET, EXTENDED RELEASE ORAL at 08:43

## 2025-06-02 RX ADMIN — AMOXICILLIN AND CLAVULANATE POTASSIUM 1 TABLET: 500; 125 TABLET, FILM COATED ORAL at 21:47

## 2025-06-02 RX ADMIN — MORPHINE SULFATE 1 MG: 2 INJECTION, SOLUTION INTRAMUSCULAR; INTRAVENOUS at 00:08

## 2025-06-02 RX ADMIN — SODIUM BICARBONATE 650 MG: 650 TABLET ORAL at 16:18

## 2025-06-02 RX ADMIN — PIPERACILLIN AND TAZOBACTAM 2.25 G: 2; .25 INJECTION, POWDER, FOR SOLUTION INTRAVENOUS at 03:18

## 2025-06-02 RX ADMIN — PIPERACILLIN AND TAZOBACTAM 2.25 G: 2; .25 INJECTION, POWDER, FOR SOLUTION INTRAVENOUS at 08:56

## 2025-06-02 RX ADMIN — APIXABAN 2.5 MG: 2.5 TABLET, FILM COATED ORAL at 08:43

## 2025-06-02 RX ADMIN — APIXABAN 2.5 MG: 2.5 TABLET, FILM COATED ORAL at 21:47

## 2025-06-02 RX ADMIN — SODIUM CHLORIDE 50 ML/HR: 0.9 INJECTION, SOLUTION INTRAVENOUS at 05:52

## 2025-06-02 RX ADMIN — SODIUM BICARBONATE 650 MG: 650 TABLET ORAL at 21:47

## 2025-06-02 RX ADMIN — Medication 25 MCG: at 08:43

## 2025-06-02 ASSESSMENT — COGNITIVE AND FUNCTIONAL STATUS - GENERAL
DRESSING REGULAR UPPER BODY CLOTHING: A LITTLE
MOVING FROM LYING ON BACK TO SITTING ON SIDE OF FLAT BED WITH BEDRAILS: A LITTLE
PERSONAL GROOMING: A LITTLE
TOILETING: TOTAL
TURNING FROM BACK TO SIDE WHILE IN FLAT BAD: A LITTLE
MOBILITY SCORE: 17
STANDING UP FROM CHAIR USING ARMS: A LITTLE
CLIMB 3 TO 5 STEPS WITH RAILING: A LOT
DRESSING REGULAR LOWER BODY CLOTHING: A LITTLE
MOVING TO AND FROM BED TO CHAIR: A LITTLE
WALKING IN HOSPITAL ROOM: A LITTLE
DAILY ACTIVITIY SCORE: 16
HELP NEEDED FOR BATHING: A LOT

## 2025-06-02 ASSESSMENT — ACTIVITIES OF DAILY LIVING (ADL)
BATHING_ASSISTANCE: MODERATE
ADL_ASSISTANCE: INDEPENDENT
LACK_OF_TRANSPORTATION: NO

## 2025-06-02 ASSESSMENT — PAIN SCALES - GENERAL
PAINLEVEL_OUTOF10: 0 - NO PAIN
PAINLEVEL_OUTOF10: 3

## 2025-06-02 ASSESSMENT — PAIN - FUNCTIONAL ASSESSMENT
PAIN_FUNCTIONAL_ASSESSMENT: 0-10

## 2025-06-02 ASSESSMENT — PAIN DESCRIPTION - ORIENTATION: ORIENTATION: LOWER

## 2025-06-02 ASSESSMENT — PAIN DESCRIPTION - LOCATION: LOCATION: BACK

## 2025-06-02 ASSESSMENT — PAIN DESCRIPTION - DESCRIPTORS: DESCRIPTORS: ACHING;DISCOMFORT

## 2025-06-02 NOTE — DISCHARGE INSTR - OTHER ORDERS
Thank you for choosing [facility] for your Health Care needs.  Also, thank you for allowing us to take you and your families preferences into account when determining your discharge plan.  Stay well!     You may receive a survey in the mail within the next couple weeks. Please take the time to complete it and return it. Your input is ALWAYS important to us. Thank you!  Your Care Transition Team - Ingris Cantrell  & Beatrice      For questions about your medications listed on your discharge instructions, please call the Nurses Station at [phone].

## 2025-06-02 NOTE — CARE PLAN
"The patient's goals for the shift include \"get some sleep\"    The clinical goals for the shift include Pt will remain HDS for entire shift. Pt will sleep 6 hours consecutively during this shift. Pt will have controlled pain for entire shift.        "

## 2025-06-02 NOTE — PROGRESS NOTES
Physical Therapy    Physical Therapy Evaluation    Patient Name: Mireya Nava  MRN: [id number]  Department: [facility] ICU  Room: 01/01-A  Today's Date: 6/2/2025   Time Calculation  Start Time: 1148  Stop Time: 1203  Time Calculation (min): 15 min    Assessment/Plan   PT Assessment  PT Assessment Results: Decreased strength, Impaired balance, Decreased mobility, Decreased endurance  Rehab Prognosis: Good  Barriers to Discharge Home: Caregiver assistance, Physical needs  Caregiver Assistance: Patient lives alone and/or does not have reliable caregiver assistance  Physical Needs: 24hr mobility assistance needed  Evaluation/Treatment Tolerance: Patient tolerated treatment well  Medical Staff Made Aware: Yes  Strengths: Ability to acquire knowledge  Barriers to Participation:  (n/a)  End of Session Communication: Bedside nurse  Assessment Comment: Pleasant 74 y.o presents with weakness and impaired mobility. Pt. lives alone and is normally IND. Pt. currently requires CGA with WW and would benefit from additional PT to address above noted limitations and prevent further decline.  End of Session Patient Position: Alarm on, Up in chair  IP OR SWING BED PT PLAN  Inpatient or Swing Bed: Inpatient  PT Plan  Treatment/Interventions: Transfer training, Bed mobility, Gait training, Stair training, Balance training, Strengthening, Endurance training, Therapeutic exercise, Therapeutic activity, Home exercise program, Neuromuscular re-education  PT Plan: Ongoing PT  PT Frequency: 3 times per week  PT Discharge Recommendations: Moderate intensity level of continued care  Equipment Recommended upon Discharge: Wheeled walker  PT Recommended Transfer Status: Assist x1  PT - OK to Discharge: Yes Based on completed evaluation and care plan recommendations, no barriers to discharge to next site of care       Subjective     PT Visit Info:  PT Received On: 06/02/25  General Visit Information:  General  Reason for Referral: impaired mobility,  generalized weakness  Referred By: Jung Esparza DO  Past Medical History Relevant to Rehab: HTN, anemia, detached retina s/p repair  Family/Caregiver Present: Yes  Prior to Session Communication: Bedside nurse  Patient Position Received: Up in chair, Alarm on  General Comment: masimo, pulse ox, andre olsen  Home Living:  Home Living  Type of Home: Mobile home  Lives With: Alone  Home Layout: One level  Home Access: Stairs to enter with rails  Entrance Stairs-Rails: Both  Entrance Stairs-Number of Steps: 3  Bathroom Shower/Tub: Walk-in shower  Bathroom Toilet: Handicapped height  Bathroom Equipment: Grab bars in shower, Shower chair with back  Prior Level of Function:  Prior Function Per Pt/Caregiver Report  Level of [place]: Independent with ADLs and functional transfers, Independent with homemaking with ambulation  Ambulatory Assistance: Independent  Prior Function Comments: +drives  Precautions:  Precautions  Medical Precautions: Fall precautions      Date/Time Vitals Session Patient Position Pulse Resp SpO2 BP MAP (mmHg)    06/02/25 1100 --  --  67  17  99 %  96/53  66     06/02/25 1148 --  --  --  --  94 %  96/53  --     06/02/25 1200 --  --  80  21  89 %  161/68  86              Objective   Pain:  Pain Assessment  Pain Assessment: 0-10  0-10 (Numeric) Pain Score: 0 - No pain  Cognition:  Cognition  Overall Cognitive Status: Within Functional Limits  Arousal/Alertness: Appropriate responses to stimuli    General Assessments:     Activity Tolerance  Endurance: Decreased tolerance for upright activites (Spo2 dropped to 88% with amb.)    Sensation  Sensation Comment: denies deficits    Strength  Strength Comments: BLE: grossly 4-/5; unable to complete STS without UE support  Coordination  Movements are Fluid and Coordinated: Yes      Static Standing Balance  Static Standing-Comment/Number of Minutes: good-; recommend AD at this time  Dynamic Standing Balance  Dynamic Standing-Comments: F+; with BUE support;  mild sway with eyes closed, feet together and tandem stance  Functional Assessments:  Bed Mobility  Bed Mobility: No    Transfers  Transfer: Yes  Transfer 1  Technique 1: Sit to stand, Stand to sit  Transfer Device 1: Walker  Transfer Level of Assistance 1: Contact guard, Minimal verbal cues    Ambulation/Gait Training  Ambulation/Gait Training Performed: Yes  Ambulation/Gait Training 2  Device 2: Rolling walker  Assistance 2: Contact guard  Quality of Gait 2: Decreased step length  Comments/Distance (ft) 2: 10'  Extremity/Trunk Assessments:  Defer to OT for UE detail   RLE   RLE : Within Functional Limits  LLE   LLE : Within Functional Limits  Outcome Measures:  [facility] Basic Mobility  Turning from your back to your side while in a flat bed without using bedrails: A little  Moving from lying on your back to sitting on the side of a flat bed without using bedrails: A little  Moving to and from bed to chair (including a wheelchair): A little  Standing up from a chair using your arms (e.g. wheelchair or bedside chair): A little  To walk in hospital room: A little  Climbing 3-5 steps with railing: A lot  Basic Mobility - Total Score: 17    FSS-ICU  Ambulation: Walks <50 feet with any assistance x1 or walks any distance with assistance x2 people  Rolling: Modified independence, requires use of assistive device  Sitting: Modified independence, requires use of assistive device  Transfer Sit-to-Stand: Supervision or set-up only  Transfer Supine-to-Sit: Supervision or set-up only  Total Score: 23      Other Measures  5x Sit to Stand: unable to complete without UE support    Encounter Problems       Encounter Problems (Active)       Balance       STG - Maintains dynamic standing balance without upper extremity support with normal balance        Start:  06/02/25    Expected End:  06/16/25               Mobility       LTG - Patient will ambulate community distance DEMAR with LRD        Start:  06/02/25    Expected End:  06/16/25             LTG - Patient will navigate 3 steps with 2 rails/device IND       Start:  06/02/25    Expected End:  06/16/25               PT Transfers       STG - Patient will perform bed mobility IND       Start:  06/02/25    Expected End:  06/16/25            STG - Patient will transfer sit to and from stand DEMAR with LRD        Start:  06/02/25    Expected End:  06/16/25            Pt. will complete 5 STS without UE support in <15 seconds        Start:  06/02/25    Expected End:  06/16/25               Pain - Adult              Education Documentation  Mobility Training, taught by Christy Gudino, PT at 6/2/2025 12:25 PM.  Learner: Patient  Readiness: Acceptance  Method: Explanation  Response: Verbalizes Understanding  Comment: Educated pt. on PT POC    Education Comments  No comments found.

## 2025-06-02 NOTE — CONSULTS
Nutrition Diet Education  Provider consult order (Education)     Visited patient at bedside while her daughter was present. This consult was requested for nutrition education. Per provider communication via secure chat, the patient had not been eating well prior to admission but has since returned to her baseline intake.    During the visit, the patient reported prior concerns about prediabetes but stated she is now feeling better and has no current nutrition concerns. She shared that she cares for a son at home who has diabetes and feels comfortable managing nutrition-related needs. The patient was friendly and conversational but declined nutrition education at this time.    Education Documentation  Nutrition Related Education, taught by Queta Newman RDN, ELICIA at 6/2/2025 11:06 AM.  Learner: Family, Patient  Readiness: Acceptance  Method: Explanation  Response: Verbalizes Understanding      Time Spent (min): 15 minutes

## 2025-06-02 NOTE — PROGRESS NOTES
Mireya Nava is a 74 y.o. female on day 3 of admission presenting with Generalized weakness.    Subjective   Interval History:   Afebrile, no chills  No chest pain or shortness of breath  No nausea vomiting or diarrhea     Review of Systems   All other systems reviewed and are negative.      Objective   Range of Vitals (last 24 hours)  Heart Rate:  [62-86]   Temp:  [35.9 °C (96.6 °F)-37.1 °C (98.8 °F)]   Resp:  [13-27]   BP: ()/(45-80)   Weight:  [79.1 kg (174 lb 6.1 oz)]   SpO2:  [92 %-100 %]   Daily Weight  06/02/25 : 79.1 kg (174 lb 6.1 oz)    Body mass index is 29.93 kg/m².    Physical Exam  Constitutional:       Appearance: Normal appearance.   HENT:      Head: Normocephalic and atraumatic.      Right Ear: External ear normal.      Left Ear: External ear normal.      Nose: Nose normal.   Eyes:      General: No scleral icterus.     Extraocular Movements: Extraocular movements intact.      Conjunctiva/sclera: Conjunctivae normal.   Cardiovascular:      Rate and Rhythm: Normal rate and regular rhythm.   Musculoskeletal:      Cervical back: Normal range of motion and neck supple.   Neurological:      Mental Status: She is alert.     Antibiotics  piperacillin-tazobactam (Zosyn) IV 2.25 g in 50 mL D5W (VBS)    Relevant Results  Labs  Results from last 72 hours   Lab Units 06/02/25  0534 06/01/25  0417 05/31/25  0721 05/30/25  2325 05/30/25  1119   WBC AUTO x10*3/uL 18.6* 19.9* 19.3*   < > 19.8*   HEMOGLOBIN g/dL 8.2* 8.2* 8.2*   < > 10.8*   HEMATOCRIT % 22.0* 21.5* 21.9*   < > 28.8*   PLATELETS AUTO x10*3/uL 185 158 151   < > 186   NEUTROS PCT AUTO %  --   --   --   --  93.2   LYMPHS PCT AUTO %  --   --   --   --  1.1   MONOS PCT AUTO %  --   --   --   --  3.1   EOS PCT AUTO %  --   --   --   --  0.1    < > = values in this interval not displayed.     Results from last 72 hours   Lab Units 06/02/25  0534 06/02/25  0017 06/01/25  1803 05/31/25  1219 05/31/25  0721   SODIUM mmol/L 113* 114* 112*   < > 112*   "112*   POTASSIUM mmol/L 3.7 3.8 4.0   < > 3.6  3.6   CHLORIDE mmol/L 87* 88* 85*   < > 85*  85*   CO2 mmol/L 15* 17* 16*   < > 18*  18*   BUN mg/dL 54* 54* 55*   < > 51*  51*   CREATININE mg/dL 3.21* 3.25* 3.18*   < > 2.81*  2.81*   GLUCOSE mg/dL 84 86 111*   < > 127*  127*   CALCIUM mg/dL 7.2* 6.9* 7.3*   < > 6.9*  6.9*   ANION GAP mmol/L 15 13 15   < > 13  13   EGFR mL/min/1.73m*2 14* 14* 15*   < > 17*  17*   PHOSPHORUS mg/dL  --   --   --   --  2.5    < > = values in this interval not displayed.     Results from last 72 hours   Lab Units 06/02/25  0534 06/01/25  0417 05/31/25  0721 05/30/25  1119   ALK PHOS U/L 175* 137*  --  178*   BILIRUBIN TOTAL mg/dL 0.7 0.6  --  1.0   PROTEIN TOTAL g/dL 5.0* 4.8*  --  6.9   ALT U/L 15 16  --  33   AST U/L 11 9  --  28   ALBUMIN g/dL 2.2* 2.2* 2.2* 3.3*     Estimated Creatinine Clearance: 15.2 mL/min (A) (by C-G formula based on SCr of 3.21 mg/dL (H)).  No results found for: \"CRP\"  Microbiology  Susceptibility data from last 14 days.  Collected Specimen Info Organism Ampicillin Cefazolin Cefazolin (uncomplicated UTIs only) Ciprofloxacin Gentamicin Levofloxacin Nitrofurantoin Piperacillin/Tazobactam Trimethoprim/Sulfamethoxazole   05/30/25 Urine from Clean Catch/Voided Escherichia coli  S  S  S  S  S  S  S  S  S   05/30/25 Blood culture from Peripheral Venipuncture Escherichia coli            05/30/25 Blood culture from Peripheral Venipuncture Escherichia coli  S  S   S  S  S   S  S       Imaging  XR abdomen 1 view  Result Date: 6/2/2025  Interpreted By:  Aaron Hanna, STUDY: XR ABDOMEN 1 VIEW;  6/2/2025 4:19 pm   INDICATION: Signs/Symptoms:n/v.     COMPARISON: CT chest abdomen and pelvis 05/30/2020   ACCESSION NUMBER(S): [id number]   ORDERING CLINICIAN: LUKE YEUNG   FINDINGS: Nonobstructive bowel gas pattern. Limited evaluation of pneumoperitoneum on supine imaging, however no gross evidence of free air is noted.   Visualized lungs are clear.   Osseous " structures demonstrate no acute bony changes.       1.  Unremarkable exam.   MACRO: None   Signed by: Aaron Ferrarasdon 6/2/2025 4:30 PM Dictation workstation:   [id number]    Electrocardiogram, 12-lead PRN ACS symptoms  Result Date: 6/2/2025  Sinus rhythm with Premature atrial complexes Otherwise normal ECG When compared with ECG of 30-MAY-2025 11:11, (unconfirmed) Sinus rhythm has replaced Atrial fibrillation    ECG 12 lead  Result Date: 6/2/2025  Atrial fibrillation Abnormal ECG When compared with ECG of 27-DEC-2005 11:33, Atrial fibrillation has replaced Sinus rhythm Vent. rate has increased BY  31 BPM Nonspecific T wave abnormality now evident in Anterior leads    XR chest 1 view  Result Date: 6/1/2025  Interpreted By:  Myrtle Duque, STUDY: XR CHEST 1 VIEW;  6/1/2025 1:37 am   INDICATION: Signs/Symptoms:change in breathing     COMPARISON: CT chest, abdomen, pelvis 05/30/2025.   ACCESSION NUMBER(S): [id number]   ORDERING CLINICIAN: ALEXA PROCTOR   TECHNIQUE: Portable upright frontal view of the chest was obtained .   FINDINGS: Monitoring leads are overlying the patient.   The heart is enlarged. There is mild interstitial edema.   There is a small left pleural effusion with airspace opacity at the left lung base. No pneumothorax.       1.  Cardiomegaly. Mild interstitial edema. Small left pleural effusion with airspace opacity at the left lung base, may be secondary to atelectasis or pneumonia.       MACRO: None.   Signed by: Myrtle Duque 6/1/2025 3:24 AM Dictation workstation:   [id number]    CT chest abdomen pelvis wo IV contrast  Result Date: 5/30/2025  Interpreted By:  Shanel Valencia, STUDY: CT CHEST ABDOMEN PELVIS WO CONTRAST;  5/30/2025 12:50 pm   INDICATION: Signs/Symptoms:cough, abd pain.   COMPARISON: None.   ACCESSION NUMBER(S): [id number]   ORDERING CLINICIAN: DARA WELCH   TECHNIQUE: CT of the chest, abdomen, and pelvis was performed without intravenous contrast.   FINDINGS:    CHEST:   Lines/Devices: None   Lungs: The trachea and central airways are patent without endobronchial lesions. There is a small left pleural effusion with adjacent compressive atelectasis. No focal consolidation, pulmonary edema, pneumothorax or suspicious nodule.   Vasculature: Mild dilation of the thoracic aorta in the ascending segment measuring 4 cm. The main pulmonary artery is normal in size. Mild coronary artery calcifications noted in the LAD.   Heart: Heart size is normal. No pericardial effusion.   Mediastinum and lyudmila: No pathologically enlarged thoracic lymphadenopathy. The esophagus is unremarkable.   Chest wall and lower neck: No significant chest wall soft tissue abnormalities. The visualized thyroid is normal.   ABDOMEN/PELVIS:   Liver: No mass. Hepatomegaly measuring 20 cm in the craniocaudal dimension.   Biliary: No intrahepatic or extrahepatic bile duct dilation. The gallbladder is collapsed and limited for evaluation.   Spleen: No mass. No splenomegaly.   Pancreas: No mass, main duct dilation or acute inflammation.   Adrenals: Normal.   Kidneys: No calculus or hydronephrosis.   GI tract: There is a small amount of free fluid and fat stranding centered around the cecum. The appendix is not identified. No pericecal free air or organized fluid collection. There are multiple loops of fluid-filled, nondilated small bowel in the pelvis. No bowel obstruction or bowel wall thickening otherwise.   Lymph nodes: No abdominopelvic lymphadenopathy.   Mesentery/peritoneum: No free air or fluid collection. There is mild generalized haziness involving the peritoneum.   Vasculature: Moderate abdominal aortic atherosclerotic calcifications without aneurysmal dilation.   Pelvis: No free air or fluid collection. Trace free fluid in the dependent pelvis. The bladder is moderate distended but otherwise normal-appearing. The uterus appears grossly unremarkable.   Bones/Soft tissues: Mild levocurvature of the lumbar  spine with multilevel thoracolumbar degenerative disc disease. Moderate to severe bilateral hip osteoarthritis. Mild abdominal wall edema.         Limited examination without intravenous contrast.   The appendix is not visualized, although right lower quadrant inflammation and free fluid centered around the cecum raise concern for possible acute appendicitis. No organized fluid collection. Please correlate with right lower quadrant tenderness.   Multiple fluid-filled small bowel within the pelvis could represent nonspecific enteritis. No bowel obstruction.   Small left pleural effusion without definite evidence of pneumonia.   4 cm ascending thoracic aortic aneurysm.   MACRO Shanel Valencia discussed the significance and urgency of this critical finding by Epic secure chat with  DARA WELCH on 5/30/2025 at 2:03 pm.  (**-RCF-**) Findings:  See findings.   Signed by: Shanel Valencia 5/30/2025 2:05 PM Dictation workstation:   [id number]      Assessment/Plan     Sepsis secondary to E. coli bacteremia  E. coli bacteremic, possibly secondary to urinary tract infection  E. coli urinary tract infection  Acute kidney injury  Leukocytosis, multifactorial     Discontinue Zosyn  Augmentin  Monitor renal function  Supportive care  Monitor temperature and WBC  Long-term plan is total of 14 days of antibiotic therapy  Plan discussed with primary team    This is a complex infectious disease issue and the following was performed today (for more details please see the above note): Management decisions reflecting the added complexity (e.g., changes in antimicrobial therapy, infection control strategies).     Nemesio Hansen MD

## 2025-06-02 NOTE — CONSULTS
Consults    This is a virtual consult.  A secure audio communication was established the patient.  Patient consented for this visit.    Chief complaint: Generalized weakness    History Of Present Illness:    Mireya Nava is a 74 y.o. female presenting with lethargy/nonspecific generalized weakness.  She was found to be in AF with a controlled ventricular response but more importantly in significant hyponatremia sodium down to 112.  She has been improving with initial hypertonic saline followed by normal saline infusion.  She is in TAMMY, creatinine in 2-3's.  1 year ago, her creatinine was normal.  No other significant prior cardiac complaints.     Last Recorded Vitals:  Vitals:    06/01/25 1800 06/01/25 1900 06/01/25 2000 06/01/25 2100   BP: 92/56 103/56 109/80 96/60   BP Location: Right arm      Patient Position: Lying      Pulse: 74 77 76 74   Resp: 18 21 (!) 27 18   Temp:       TempSrc:       SpO2: 97% 96% 93% 94%   Weight:       Height:           Last Labs:  CBC - 6/1/2025:  4:17 AM  19.9 8.2 158    21.5      CMP - 6/1/2025:  6:03 PM  7.3 4.8 9 --- 0.6   2.5 2.2 16 137      PTT - No results in last year.  _   _ _     BNP   Date/Time Value Ref Range Status   06/01/2025 04:17 AM 1,057 (H) 0 - 99 pg/mL Final     Hemoglobin A1C   Date/Time Value Ref Range Status   05/30/2025 11:25 PM 5.7 (H) See comment % Final   05/30/2024 07:59 AM 5.6 see below % Final     LDL Calculated   Date/Time Value Ref Range Status   05/30/2024 07:59  (H) <=99 mg/dL Final     Comment:                                 Near   Borderline      AGE      Desirable  Optimal    High     High     Very High     0-19 Y     0 - 109     ---    110-129   >/= 130     ----    20-24 Y     0 - 119     ---    120-159   >/= 160     ----      >24 Y     0 -  99   100-129  130-159   160-189     >/=190     05/23/2023 08:29  65 - 130 MG/DL Final   05/25/2022 08:21  65 - 130 MG/DL Final   01/04/2021 07:28  65 - 130 MG/DL Final     VLDL    Date/Time Value Ref Range Status   05/30/2024 07:59 AM 20 0 - 40 mg/dL Final      Last I/O:  I/O last 3 completed shifts:  In: 7645.8 (97.8 mL/kg) [P.O.:2500; I.V.:3446.9 (44.1 mL/kg); IV Piggyback:1698.9]  Out: 1775 (22.7 mL/kg) [Urine:1775 (0.6 mL/kg/hr)]  Weight: 78.2 kg       Past Medical History:  She has a past medical history of Asymptomatic menopausal state (06/03/2022), Encounter for screening mammogram for malignant neoplasm of breast (01/12/2021), and Seasonal allergies (08/19/2019).    Past Surgical History:  She has no past surgical history on file.      Social History:  She reports that she has never smoked. She has never used smokeless tobacco. She reports that she does not currently use alcohol. She reports that she does not use drugs.    Family History:  Family History[1]     Allergies:  Patient has no known allergies.    Inpatient Medications:  Scheduled Medications[2]  PRN Medications[3]  Continuous Medications[4]  Outpatient Medications:  Current Outpatient Medications   Medication Instructions    amLODIPine (NORVASC) 10 mg, oral, Daily    calcium carbonate-vitamin D3 (Os-Jovany 500 + D3) 500 mg-5 mcg (200 unit) tablet Take by mouth.    cholecalciferol (VITAMIN D3) 25 mcg, Daily    losartan (COZAAR) 100 mg, oral, Daily    metoprolol succinate XL (TOPROL-XL) 100 mg, oral, Daily     Assessment/Plan   AF with a controlled ventricular response  Severe hyponatremia, being corrected  TAMMY    Recommend:  -Continue management of hyponatremia.  May consider tolvaptan starting at 15 mg daily, if the patient is clinically hypervolemic and having decent urine output.  - Oral anticoagulation  - Continue metoprolol for rate control  - Echo  -Elective outpatient cardioversion after 1 month of oral anticoagulation, if she does not revert spontaneously back to sinus rhythm.    Thanks for the consult  Please call with any questions  Peripheral IV 05/30/25 20 G Right Antecubital (Active)   Site Assessment  Clean;Dry;Intact 06/01/25 2100   Dressing Status Clean;Dry 06/01/25 2100   Number of days: 2       Peripheral IV 05/30/25 20 G Anterior;Left Forearm (Active)   Site Assessment Clean;Dry;Intact 06/01/25 2100   Dressing Status Clean;Dry 06/01/25 2100   Number of days: 2       Peripheral IV 05/30/25 20 G Left Antecubital (Active)   Site Assessment Clean;Dry;Intact 06/01/25 2100   Dressing Status Clean;Occlusive;Dry 06/01/25 2100   Number of days: 2       Urethral Catheter 16 Fr. (Active)   Output (mL) 60 mL 06/01/25 1800   Number of days: 2       Code Status:  Full Code    I spent 40 minutes in the professional and overall care of this patient.        Rosanna Arevalo MD       [1] No family history on file.  [2]   Scheduled medications   Medication Dose Route Frequency    [Held by provider] amLODIPine  10 mg oral Daily    apixaban  2.5 mg oral BID    cholecalciferol  25 mcg oral Daily    insulin lispro  0-10 Units subcutaneous TID AC    [Held by provider] losartan  100 mg oral Daily    metoprolol succinate XL  100 mg oral Daily    piperacillin-tazobactam (Zosyn) 2.25 g in dextrose 5% 50 mL IV  2.25 g intravenous q6h   [3]   PRN medications   Medication    acetaminophen    dextromethorphan-guaifenesin    ipratropium-albuteroL    morphine    oxygen   [4]   Continuous Medications   Medication Dose Last Rate    sodium chloride 0.9%  50 mL/hr 50 mL/hr (06/01/25 0638)

## 2025-06-02 NOTE — CARE PLAN
"The patient's goals for the shift include \"get some sleep\"    The clinical goals for the shift include Patient will remain hemodynamically stable his shift.    Pt was able to eat and drink for breakfast, had chicken noodle soup for lunch, was maintaining her vitals except pulse ox would drop with exertion but would recover with rest. IV fluids were stopped along with IV antibiotics, pt was started on oral antibiotics. Pt had one episode of vomiting after drinking water while her family was visiting at the bedside, pt was cleaned and changed, no further emesis.    "

## 2025-06-02 NOTE — CARE PLAN
"The patient's goals for the shift include \"get some sleep\"    The clinical goals for the shift include Pt will remain hemodynamically stable this shift.        "

## 2025-06-02 NOTE — PROGRESS NOTES
Cardiology Progress Note  Patient: Mireya Nava  Unit/Bed: 01/01-A  YOB: 1950    Admitting Diagnosis: Urinary retention [R33.9]  Hyponatremia [E87.1]  Generalized abdominal pain [R10.84]  New onset atrial fibrillation (Multi) [I48.91]  Generalized weakness [R53.1]  Atrial fibrillation, persistent (Multi) [I48.19]  Nausea and vomiting, unspecified vomiting type [R11.2]  Date:  5/30/2025  Attending: Isabella Walker MD      Hospital Day: 3    SUBJECTIVE:   Sitting up in the chair, notes mild SOB but improving, feeling slightly stronger and not as weak.   Converted to NSR this AM. No acute distress.      OBJECTIVE  Vitals:   Visit Vitals  /68 (BP Location: Left arm, Patient Position: Sitting)   Pulse 80   Temp 36.3 °C (97.3 °F) (Temporal)   Resp 21        Wt Readings from Last 5 Encounters:   06/02/25 79.1 kg (174 lb 6.1 oz)   05/27/25 68.5 kg (151 lb)   06/21/24 65.8 kg (145 lb)   06/10/24 66.7 kg (147 lb)   09/29/23 66.2 kg (146 lb)       INTAKE/ OUTPUT:   I/O last 3 completed shifts:  In: 6668.6 (84.3 mL/kg) [P.O.:2180; I.V.:3039.7 (38.4 mL/kg); IV Piggyback:1448.9]  Out: 2155 (27.2 mL/kg) [Urine:2155 (0.8 mL/kg/hr)]  Weight: 79.1 kg      TELEMETRY MONITORING : SR 70s    PHYSICAL EXAMINATION:    Vitals reviewed.   Constitutional:       General: Awake.      Appearance: Normal appearance. Well-developed and not in distress.   Eyes:      General: Lids are normal.      Pupils: Pupils are equal, round, and reactive to light.   HENT:      Right Ear: External ear normal.      Left Ear: External ear normal.      Nose: Nose normal.    Mouth/Throat:      Lips: Pink.      Mouth: Mucous membranes are moist.   Pulmonary:      Effort: Pulmonary effort is normal.      Breath sounds: Normal air entry.      Comments: Fine crackles in the bases  Cardiovascular:      PMI at left midclavicular line. Normal rate. Regular rhythm. Normal S1. Normal S2.       Murmurs: There is no murmur.      Comments: Race  edema in the joints of the hands  Edema:     Peripheral edema absent.   Abdominal:      General: Bowel sounds are normal.      Palpations: Abdomen is soft.      Tenderness: There is no abdominal tenderness.   Musculoskeletal:      Cervical back: Full passive range of motion without pain, normal range of motion and neck supple. Skin:     General: Skin is warm and dry.   Neurological:      General: No focal deficit present.      Mental Status: Alert, oriented to person, place, and time and oriented to person, place and time. Mental status is at baseline.   Psychiatric:         Attention and Perception: Attention normal.         Mood and Affect: Mood normal.         Speech: Speech normal.         Behavior: Behavior is cooperative.        LABS:   CMP:   Recent Labs     06/02/25  0534 06/02/25  0017 06/01/25  1803 06/01/25  1225 06/01/25  0827 06/01/25  0417 06/01/25  0018 05/31/25  2005 05/31/25  1610 05/31/25  1219 05/31/25  0721   * 114* 112* 113* 114* 112* 113* 114* 110* 112* 112*  112*   K 3.7 3.8 4.0 3.8 3.7 3.7 3.8 3.9 3.7 4.0 3.6  3.6   CL 87* 88* 85* 86* 86* 86* 86* 86* 84* 83* 85*  85*   CO2 15* 17* 16* 16* 18* 17* 18* 17* 17* 18* 18*  18*   ANIONGAP 15 13 15 15 14 13 13 15 13 15 13  13   BUN 54* 54* 55* 53* 52* 54* 51* 54* 51* 52* 51*  51*   CREATININE 3.21* 3.25* 3.18* 3.00* 3.07* 3.08* 2.98* 3.19* 2.88* 2.98* 2.81*  2.81*   EGFR 14* 14* 15* 16* 15* 15* 16* 15* 16* 16* 17*  17*   MG  --   --   --   --   --   --   --   --   --   --  1.75     LIVER ENZYMES:   Recent Labs     06/02/25  0534 06/01/25  0417 05/31/25  0721 05/30/25  1119 05/30/24  0759 05/23/23  0829 05/25/22  0821 01/04/21  0728 06/11/19  0821   ALBUMIN 2.2* 2.2* 2.2* 3.3* 4.7 4.3 4.5 4.5 4.7   ALT 15 16  --  33 13 13 15 13 15   AST 11 9  --  28 18 21 21 23 26   BILITOT 0.7 0.6  --  1.0 0.4 0.3 0.3 0.2 0.4     CBC:  Recent Labs     06/02/25  0534 06/01/25  0417 05/31/25  0721 05/30/25  2325 05/30/25  1119 05/30/24  0759  "05/23/23  0829 05/25/22  0821   WBC 18.6* 19.9* 19.3* 18.8* 19.8* 7.0 6.3 4.9   HGB 8.2* 8.2* 8.2* 9.2* 10.8* 11.2* 11.3* 11.4*   HCT 22.0* 21.5* 21.9* 24.0* 28.8* 33.2* 33.9* 34.4*    158 151 155 186 304 294 296   MCV 82 81 82 81 82 92 91.6 91.7     COAG: No results for input(s): \"PTT\", \"INR\", \"ARIXTRA\" in the last 41452 hours.  ABO: No results for input(s): \"ABO\" in the last 09176 hours.  HEME/ENDO:  Recent Labs     05/31/25  0721 05/30/25  2325 05/30/24  0759 05/25/22  0821 01/04/21  0728   TSH 0.58  --   --   --  1.18   HGBA1C  --  5.7* 5.6   < > 5.6    < > = values in this interval not displayed.      CARDIAC:   Recent Labs     06/01/25  0417   BNP 1,057*       Lipid Panel:  No results found for: \"HDL\", \"CHHDL\", \"VLDL\", \"TRIG\", \"NHDL\"     BNP:  BNP   Date Value Ref Range Status   06/01/2025 1,057 (H) 0 - 99 pg/mL Final     TSH  Lab Results   Component Value Date    TSH 0.58 05/31/2025     Hemoglobin A1C   Date Value Ref Range Status   05/30/2025 5.7 (H) See comment % Final      CURRENT MEDICATIONS:   Infusions:  sodium chloride 0.9%, Last Rate: 50 mL/hr (06/02/25 0800)      Scheduled:  [Held by provider] amLODIPine, 10 mg, Daily  apixaban, 2.5 mg, BID  cholecalciferol, 25 mcg, Daily  insulin lispro, 0-10 Units, TID AC  [Held by provider] losartan, 100 mg, Daily  metoprolol succinate XL, 100 mg, Daily  piperacillin-tazobactam (Zosyn) 2.25 g in dextrose 5% 50 mL IV, 2.25 g, q6h      PRN:  acetaminophen, 650 mg, q4h PRN  dextromethorphan-guaifenesin, 5 mL, q4h PRN  ipratropium-albuteroL, 3 mL, 4x daily PRN  oxyCODONE, 5 mg, q4h PRN  oxygen, , Continuous PRN - O2/gases      IMAGING REPORTS INDEPENDENTLY REVIEWED:   CXR 6/1/2025  IMPRESSION:  1.  Cardiomegaly. Mild interstitial edema. Small left pleural  effusion with airspace opacity at the left lung base, may be  secondary to atelectasis or pneumonia.    CT chest abdomen pelvis 5/30/2025  IMPRESSION:      Limited examination without intravenous contrast.    "   The appendix is not visualized, although right lower quadrant  inflammation and free fluid centered around the cecum raise concern  for possible acute appendicitis. No organized fluid collection.  Please correlate with right lower quadrant tenderness.      Multiple fluid-filled small bowel within the pelvis could represent  nonspecific enteritis. No bowel obstruction.    Small left pleural effusion without definite evidence of pneumonia.    4 cm ascending thoracic aortic aneurysm.    CARDIOVASCULAR STUDIES:     Echocardiogram 6/2/2025---pending    ASSESSMENT:    73 yo F admitted with AF with a controlled ventricular response  Severe hyponatremia, being corrected  TAMMY     Plan  Continue management of hyponatremia.  May consider tolvaptan starting at 15 mg daily, if the patient is clinically hypervolemic and having decent urine output.  Continue Oral anticoagulation   Continue metoprolol for rate control  Echocardiogram to assess LVEF and structural heart   Elective outpatient cardioversion after 1 month of oral anticoagulation, if she does not revert spontaneously back to sinus rhythm.  Spontaneously converted 6/2/2025         Thank you  for the consult   Will follow peripherally   Please call or message with questions, concerns or changes in clinical condition   The case was discussed with GOMEZ Schultz       Electronically signed by GOMEZ Bill on 6/2/2025 at 12:16 PM

## 2025-06-02 NOTE — PROGRESS NOTES
06/02/25 1212   Discharge Planning   Living Arrangements Alone   Support Systems Children;Family members   Assistance Needed Patient lives in the senior mobile home community alone.  She has someone cut her grass.  Her son or daughter help with things as needed.  She says she is indepednent with ADLs, IADLs, ambulation and drives locally.  No DME-no home oxygen.    Confirmed address, pharmacy, and PCP is [facility].   Type of Residence Private residence  (Mobile home)   Number of Stairs to Enter Residence 3   Number of Stairs Within Residence 0   Do you have animals or pets at home? No   Home or Post Acute Services None   Expected Discharge Disposition Home   Does the patient need discharge transport arranged? No   Financial Resource Strain   How hard is it for you to pay for the very basics like food, housing, medical care, and heating? Not hard   Housing Stability   In the last 12 months, was there a time when you were not able to pay the mortgage or rent on time? N   In the past 12 months, how many times have you moved where you were living? 0   At any time in the past 12 months, were you homeless or living in a shelter (including now)? N   Transportation Needs   In the past 12 months, has lack of transportation kept you from medical appointments or from getting medications? no   In the past 12 months, has lack of transportation kept you from meetings, work, or from getting things needed for daily living? No   Stroke Family Assessment   Stroke Family Assessment Needed No   Intensity of Service   Intensity of Service 0-30 min        06/02/25 1438   Discharge Planning   Home or Post Acute Services Post acute facilities (Rehab/SNF/etc)   Type of Post Acute Facility Services Skilled nursing   Expected Discharge Disposition SNF  (Patient aware of MOD intensity recommendation- choiced 2 SNFs and referrals sent- no barriers with insurance;)      Patient informed of recommendation for MOD level of therapy/Skilled  Nursing Facility upon discharge.  Printed insurance choice list for patient.  SNF choice (s) is/are:   1. [facility]  2. [facility]  Referrals sent via [facility].  Patient admitted 5/30 inpatient-no barriers with insurance for discharge.  Patient does have some concerns regarding not being able to see her disabled son who resides in a .         06/02/25 7417   Discharge Planning   Home or Post Acute Services Post acute facilities (Rehab/SNF/etc)   Type of Post Acute Facility Services Skilled nursing   Expected Discharge Disposition SNF  (Acceped at [facility] SN[facility]-patient aware; no barriers with insurance on dc)   Does the patient need discharge transport arranged? Yes   RoundTrip coordination needed? Yes   Has discharge transport been arranged? No

## 2025-06-02 NOTE — PROGRESS NOTES
"Hospital day: 2    24 hour events: no acute events, urine output has increased     HPI/Hospital Course   74F with HTN, anemia, detached retina s/p repair presented with nausea, vomiting, decreased appetite x 7 days.  Patient reports 7 days ago, she ate a sandwich with sausage, spinach and since then, she has had nausea and vomiting, decreased appetite.  Last BM 1-2 days ago. Reports associated chills, low UOP, poor appetite.  She denies chest pain, shortness of breath, blood in her urine, black or bloody stools, lower extremity edema.  CT AP with non-specific fluid filled SB ?enteritis.      5/31 > worsening TAMMY > pending transfer for nephrology consult, Na improving slowly, trending Na q4h, powell in place > good UOP, A-Fib rate controlled > metoprolol restarted, hep gtt ongoing, surgery following > non-op management, blood cx +E.COLI, 1L bolus given  5/30 > ongoing hypertonic 3% infusion @ 15, trending Na q4h, hep gtt ongoing for afib, beta blocker held 2/2 marginal BP, CARDS consult & ECHO            Subjective/ Objective : she continues to be in Atrial fibrillation, c/o back pain, urine output has increased     Visit Vitals  /75 (BP Location: Left arm, Patient Position: Lying)   Pulse 75   Temp 36 °C (96.8 °F) (Temporal)   Resp 21   Ht 1.626 m (5' 4\")   Wt 78.2 kg (172 lb 6.4 oz)   SpO2 95%   BMI 29.59 kg/m²   OB Status Postmenopausal   Smoking Status Never   BSA 1.88 m²       Physicial examination is limited by the televisit. This is based on the input from the nursing team at bedside.   HEENT: PERRLA, EOM intact  CVS-S1S2 heard, no rubs or gallops  RS: Clear bilaterally  Abd: no guarding or rigidity  CNS: no new deficits   Ext: no cyanosis/clubbing or edema    Lab Results   Component Value Date    GLUCOSE 111 (H) 06/01/2025    CALCIUM 7.3 (L) 06/01/2025     (LL) 06/01/2025    K 4.0 06/01/2025    CO2 16 (L) 06/01/2025    CL 85 (L) 06/01/2025    BUN 55 (H) 06/01/2025    CREATININE 3.18 (H) 06/01/2025 "     Lab Results   Component Value Date    WBC 19.9 (H) 06/01/2025    HGB 8.2 (L) 06/01/2025    HCT 21.5 (L) 06/01/2025    MCV 81 06/01/2025     06/01/2025     Current Medications[1]    XR chest 1 view 06/01/2025    Narrative  Interpreted By:  Myrtle Duque,  STUDY:  XR CHEST 1 VIEW;  6/1/2025 1:37 am    INDICATION:  Signs/Symptoms:change in breathing      COMPARISON:  CT chest, abdomen, pelvis 05/30/2025.    ACCESSION NUMBER(S):  [id number]    ORDERING CLINICIAN:  ALEXA PROCTOR    TECHNIQUE:  Portable upright frontal view of the chest was obtained .    FINDINGS:  Monitoring leads are overlying the patient.    The heart is enlarged. There is mild interstitial edema.    There is a small left pleural effusion with airspace opacity at the  left lung base. No pneumothorax.    Impression  1.  Cardiomegaly. Mild interstitial edema. Small left pleural  effusion with airspace opacity at the left lung base, may be  secondary to atelectasis or pneumonia.       Assessment and Plan:     Problem list:  Generalized weakness/ Fall   UTI- On antibiotics  Hyponatremia- hyperosmolar/hypovolemic  Atrial fibrillation on anticoagulation- new onset  TAMMY  Non specific gastroenteritis    Plan:  Continue with the antibiotics  Continue to monitor sodium  Monitor urine output  Telemonitoring   Continue heparin drip  Appreciate cardiology input       Time spent: 30 minutes spent to round on patients/ nursing team and the documentation    The entirety of this visit history, physical exam and diagnostic interpretation was done via telemedicine.  This telemedicine visit format was completed with a combination of audio and visual capability with onsite assistance and input from the bedside registered nurse.  The patient is located at [facility] and I am currently located in [place], [place].         [1]   Current Facility-Administered Medications:     acetaminophen (Tylenol) tablet 650 mg, 650 mg, oral, q4h PRN, 650 mg  at 06/01/25 1712 **OR** [DISCONTINUED] acetaminophen (Tylenol) oral liquid 650 mg, 650 mg, nasogastric tube, q4h PRN **OR** [DISCONTINUED] acetaminophen (Tylenol) suppository 650 mg, 650 mg, rectal, q4h PRN, Carlos Alberto Noel MD    [Held by provider] amLODIPine (Norvasc) tablet 10 mg, 10 mg, oral, Daily, Jung Esparza DO    apixaban (Eliquis) tablet 2.5 mg, 2.5 mg, oral, BID, Sandy Danielle, ARNULFO-CNP, 2.5 mg at 06/01/25 2124    cholecalciferol (Vitamin D-3) tablet 25 mcg, 25 mcg, oral, Daily, GOMEZ Fuller, 25 mcg at 06/01/25 0842    dextromethorphan-guaifenesin (Robitussin DM)  mg/5 mL oral liquid 5 mL, 5 mL, oral, q4h PRN, Carlos Alberto Noel MD    insulin lispro injection 0-10 Units, 0-10 Units, subcutaneous, TID AC, GOMEZ Fuller    ipratropium-albuteroL (Duo-Neb) 0.5-2.5 mg/3 mL nebulizer solution 3 mL, 3 mL, nebulization, 4x daily PRN, Carlos Alberto Noel MD, 3 mL at 06/01/25 0027    [Held by provider] losartan (Cozaar) tablet 100 mg, 100 mg, oral, Daily, Jung Esparza DO    metoprolol succinate XL (Toprol-XL) 24 hr tablet 100 mg, 100 mg, oral, Daily, Jung Esparza DO, 100 mg at 06/01/25 0842    morphine injection 1 mg, 1 mg, intravenous, q3h PRN, Carlos Alberto Noel MD, 1 mg at 06/01/25 1915    oxygen (O2) therapy, , inhalation, Continuous PRN - O2/gases, Carlos Alberto Noel MD, 2 L/min at 06/01/25 0027    piperacillin-tazobactam (Zosyn) 2.25 g in dextrose 5% 50 mL IV, 2.25 g, intravenous, q6h, GOMEZ Fuller, 2.25 g at 06/01/25 1544    sodium chloride 0.9% infusion, 50 mL/hr, intravenous, Continuous, Sandy Danielle APRN-CNP, Last Rate: 50 mL/hr at 06/01/25 0638, 50 mL/hr at 06/01/25 0638

## 2025-06-02 NOTE — PROGRESS NOTES
Mireya Nava is a 74 y.o. female on day 3 of admission presenting with Generalized weakness.    Subjective   She is resting in bed this morning.  Alert and oriented x 3, mildly forgetful at times.  Encouraged her to get out of bed and up to a chair as often as possible.  We discussed her sodium levels, her energy levels and her kidney function.  Will continue to monitor closely, all questions answered.     Objective     Physical Exam  Constitutional:       General: She is not in acute distress.     Appearance: Normal appearance. She is not toxic-appearing.   HENT:      Head: Normocephalic and atraumatic.      Mouth/Throat:      Mouth: Mucous membranes are moist.   Eyes:      Extraocular Movements: Extraocular movements intact.      Pupils: Pupils are equal, round, and reactive to light.   Cardiovascular:      Rate and Rhythm: Normal rate. Rhythm irregular.      Pulses: Normal pulses.      Heart sounds: Normal heart sounds. No murmur heard.     No gallop.   Pulmonary:      Effort: Pulmonary effort is normal. No respiratory distress.      Breath sounds: Normal breath sounds. No wheezing, rhonchi or rales.   Abdominal:      General: Bowel sounds are normal. There is no distension.      Palpations: Abdomen is soft.      Tenderness: There is no abdominal tenderness. There is no guarding or rebound.   Musculoskeletal:         General: No swelling, tenderness, deformity or signs of injury. Normal range of motion.      Cervical back: Normal range of motion and neck supple.   Skin:     General: Skin is warm and dry.      Capillary Refill: Capillary refill takes less than 2 seconds.      Coloration: Skin is not jaundiced.      Findings: No bruising or rash.   Neurological:      General: No focal deficit present.      Mental Status: She is alert and oriented to person, place, and time. Mental status is at baseline.      Cranial Nerves: No cranial nerve deficit.      Sensory: No sensory deficit.      Motor: No weakness.     "  Gait: Gait normal.   Psychiatric:         Mood and Affect: Mood normal.         Behavior: Behavior normal.         Thought Content: Thought content normal.         Judgment: Judgment normal.         Last Recorded Vitals  Blood pressure 124/67, pulse 78, temperature 36.7 °C (98.1 °F), temperature source Temporal, resp. rate 18, height 1.626 m (5' 4\"), weight 79.1 kg (174 lb 6.1 oz), SpO2 100%.  Intake/Output last 3 Shifts:  I/O last 3 completed shifts:  In: 6668.6 (84.3 mL/kg) [P.O.:2180; I.V.:3039.7 (38.4 mL/kg); IV Piggyback:1448.9]  Out: 2155 (27.2 mL/kg) [Urine:2155 (0.8 mL/kg/hr)]  Weight: 79.1 kg     Scheduled medications  Scheduled Medications[1]  Continuous medications  Continuous Medications[2]  PRN medications  PRN Medications[3]    Relevant Results  XR chest 1 view  Result Date: 6/1/2025  1.  Cardiomegaly. Mild interstitial edema. Small left pleural effusion with airspace opacity at the left lung base, may be secondary to atelectasis or pneumonia.       MACRO: None.   Signed by: Myrtle Duque 6/1/2025 3:24 AM Dictation workstation:   [id number]    CT chest abdomen pelvis wo IV contrast  Result Date: 5/30/2025  Limited examination without intravenous contrast.   The appendix is not visualized, although right lower quadrant inflammation and free fluid centered around the cecum raise concern for possible acute appendicitis. No organized fluid collection. Please correlate with right lower quadrant tenderness.   Multiple fluid-filled small bowel within the pelvis could represent nonspecific enteritis. No bowel obstruction.   Small left pleural effusion without definite evidence of pneumonia.   4 cm ascending thoracic aortic aneurysm.   MACRO Shanel Valencia discussed the significance and urgency of this critical finding by Epic secure chat with  DARA WELCH on 5/30/2025 at 2:03 pm.  (**-RCF-**) Findings:  See findings.   Signed by: Shanel Valencia 5/30/2025 2:05 PM Dictation workstation:   [id number]   " Latest Reference Range & Units 06/01/25 12:25 06/01/25 18:03 06/02/25 00:17 06/02/25 05:34   GLUCOSE 74 - 99 mg/dL 110 (H) 111 (H) 86 84   SODIUM 136 - 145 mmol/L 113 (LL) 112 (LL) 114 (LL) 113 (LL)   POTASSIUM 3.5 - 5.3 mmol/L 3.8 4.0 3.8 3.7   CHLORIDE 98 - 107 mmol/L 86 (L) 85 (L) 88 (L) 87 (L)   Bicarbonate 21 - 32 mmol/L 16 (L) 16 (L) 17 (L) 15 (L)   Anion Gap 10 - 20 mmol/L 15 15 13 15   Blood Urea Nitrogen 6 - 23 mg/dL 53 (H) 55 (H) 54 (H) 54 (H)   Creatinine 0.50 - 1.05 mg/dL 3.00 (H) 3.18 (H) 3.25 (H) 3.21 (H)   EGFR >60 mL/min/1.73m*2 16 (L) 15 (L) 14 (L) 14 (L)   Calcium 8.6 - 10.3 mg/dL 7.2 (L) 7.3 (L) 6.9 (L) 7.2 (L)      Latest Reference Range & Units 05/30/25 11:19 05/30/25 23:25 05/31/25 07:21 06/01/25 04:17   WBC 4.4 - 11.3 x10*3/uL 19.8 (H) 18.8 (H) 19.3 (H) 19.9 (H)   nRBC 0.0 - 0.0 /100 WBCs 0.0 0.0 0.0 0.0   RBC 4.00 - 5.20 x10*6/uL 3.51 (L) 2.97 (L) 2.67 (L) 2.65 (L)   HEMOGLOBIN 12.0 - 16.0 g/dL 10.8 (L) 9.2 (L) 8.2 (L) 8.2 (L)   HEMATOCRIT 36.0 - 46.0 % 28.8 (L) 24.0 (L) 21.9 (L) 21.5 (L)   MCV 80 - 100 fL 82 81 82 81   MCH 26.0 - 34.0 pg 30.8 31.0 30.7 30.9   MCHC 32.0 - 36.0 g/dL 37.5 (H) 38.3 (H) 37.4 (H) 38.1 (H)   RED CELL DISTRIBUTION WIDTH 11.5 - 14.5 % 13.8 13.4 13.8 14.0   Platelets 150 - 450 x10*3/uL 186 155 151 158      [facility] Reference Range & Units 05/30/25 11:19 05/30/25 23:25 05/31/25 07:21 06/01/25 04:17   WBC 4.4 - 11.3 x10*3/uL 19.8 (H) 18.8 (H) 19.3 (H) 19.9 (H)   nRBC 0.0 - 0.0 /100 WBCs 0.0 0.0 0.0 0.0   RBC 4.00 - 5.20 x10*6/uL 3.51 (L) 2.97 (L) 2.67 (L) 2.65 (L)   HEMOGLOBIN 12.0 - 16.0 g/dL 10.8 (L) 9.2 (L) 8.2 (L) 8.2 (L)   HEMATOCRIT 36.0 - 46.0 % 28.8 (L) 24.0 (L) 21.9 (L) 21.5 (L)   MCV 80 - 100 fL 82 81 82 81   MCH 26.0 - 34.0 pg 30.8 31.0 30.7 30.9   MCHC 32.0 - 36.0 g/dL 37.5 (H) 38.3 (H) 37.4 (H) 38.1 (H)   RED CELL DISTRIBUTION WIDTH 11.5 - 14.5 % 13.8 13.4 13.8 14.0   Platelets 150 - 450 x10*3/uL 186 155 151 158     Assessment & Plan  Primary  hypertension    Hyperlipidemia    Hypo-osmolar hyponatremia    TAMMY (acute kidney injury)    Atrial fibrillation (Multi)    UTI (urinary tract infection)    Acute urinary retention      Severe hypo-osmolar hyponatremia  - Given IV fluids with normal saline in the ER of 1 L LR and repeat sodium is unchanged at 109.  - 5/30 serum osmol 251  - TSH 0.58  - urine Na 22 - consistent with hypovolemic hyponatremia, likely secondary to dehydration based on her history.  - Patient is not on any obvious home medications to cause hyponatremia.  - Sodium was 130 on 5/30/2024.  - 3% hypertonic saline at 15 mL/h for 4 hours x 2  - Na 109>111>112>114>113  - ICU monitoring  - Patient is alert and oriented to self, birthday, president and year and appears mentating well.  She does have some generalized weakness but no obvious gross focal neurologic deficits.  - cortisol 28.5  - Goal to achieve a 24-hour increase in serum sodium of 4 to 6 mEq/L.   - Placed on seizure precautions.  - Consult critical care.  - Q6 BMP  - transfer if renal function worsening     Acute kidney injury  - Likely secondary to dehydration with decreased oral intake.  -creat 2.68 > 2.88 > 3.25 > 3.21  - urine creat 71.1 > 34.8  - urine sodium 23, Fena calculated showing likely pre-renal cause  - Patient given 1 L of IV fluids in the ER.  As above, will place on hypertonic saline at 15 mL/h for 4 hours and check BMPs every 4 hours (complete)  - 5/31 received D5 .45 overnight - bolus 100ml 3% NS this am and then 0.9NaCl @125ml/hr and monitor q4hr BMP - making urine approx. 50ml/hr - encourage po intake  - CT scan of the abdomen pelvis with no signs of hydronephrosis, but patient apparently had some urinary retention, Campbell catheter was placed.  - Hold home medication of losartan with acute kidney injury.  - Monitor.     Nonspecific enteritis  - Dr. Garner contacted by the ER provider for a consult, appreciate recs, feels no surgical need at this time   - Will place  on clinical diet at this time and monitor.  - WBC 19.8>18.8>19.3>19.9  - blood culture  gram (-) neg bacilli aerobic and anaerobic bottles - change to zosyn  - ID consult     New onset atrial fibrillation  - continue metoprolol  - heparin gtt > apixaban  - echo pending  - Consult cardiology, appreciate recs     UTI  - Rocephin and follow urine culture, changed to zosyn  - no nitrites in the urine - unlikely source   - consult ID, appreciate recs      Urinary retention  - Campbell catheter   - strict IO     Hypertension  - Hold losartan with acute kidney injury.  - Continue metoprolol  mg daily, hold amlodipine 10 mg daily until normotensive      *4 cm ascending thoracic aortic aneurysm found incidentally on CT scan  - Recommend follow-up with primary care provider and cardiothoracic surgery as an outpatient.     GI ppx: PPI  DVT ppx: heparin gtt > apixaban  Fluids: 50 ml/hr  Electrolytes: replace as needed  Nutrition: reg diet  Adjuncts: PIV  Code Status: full  Pt requires inpatient stay at this time.    Sandy Danielle, APRN-CNP         [1] [Held by provider] amLODIPine, 10 mg, oral, Daily  apixaban, 2.5 mg, oral, BID  cholecalciferol, 25 mcg, oral, Daily  insulin lispro, 0-10 Units, subcutaneous, TID AC  [Held by provider] losartan, 100 mg, oral, Daily  metoprolol succinate XL, 100 mg, oral, Daily  piperacillin-tazobactam (Zosyn) 2.25 g in dextrose 5% 50 mL IV, 2.25 g, intravenous, q6h     [2] sodium chloride 0.9%, 50 mL/hr, Last Rate: 50 mL/hr (06/02/25 0651)     [3] PRN medications: acetaminophen **OR** [DISCONTINUED] acetaminophen **OR** [DISCONTINUED] acetaminophen, dextromethorphan-guaifenesin, ipratropium-albuteroL, oxyCODONE, oxygen

## 2025-06-02 NOTE — PROGRESS NOTES
Occupational Therapy    Evaluation    Patient Name: Mireya Nava  MRN: [id number]  Department: [facility] ICU  Room: 01/01-A  Today's Date: 6/2/2025  Time Calculation  Start Time: 0845  Stop Time: 0902  Time Calculation (min): 17 min    Assessment  IP OT Assessment  OT Assessment: Pt. is a 74 y.o. female referred to occupational therapy for impaired self-care 2/2 admission for generalized weakness. Pt. displays decreased balance, endurance, transfers, self-care, and mobility. Pt. would benefit from skilled OT at MOD intensity to address above deficits and return to PLOF in LRE.  Prognosis: Good  Barriers to Discharge Home: Caregiver assistance, Physical needs  Caregiver Assistance: Patient lives alone and/or does not have reliable caregiver assistance  Physical Needs: Stair navigation into home limited by function/safety, 24hr mobility assistance needed, Intermittent ADL assistance needed, High falls risk due to function or environment  Evaluation/Treatment Tolerance: Patient tolerated treatment well  Medical Staff Made Aware: Yes  End of Session Communication: Bedside nurse  End of Session Patient Position: Alarm on, Up in chair  Plan:  Treatment Interventions: ADL retraining, Functional transfer training, UE strengthening/ROM, Endurance training, Patient/family training, Neuromuscular reeducation, Equipment evaluation/education, Compensatory technique education  OT Frequency: 3 times per week  OT Discharge Recommendations: Moderate intensity level of continued care  OT Recommended Transfer Status: Minimal assist, Assist of 1  OT - OK to Discharge: Yes (Based on completed evaluation and care plan recommendations, no barriers to discharge to next site of care.)    Subjective   Current Problem:  1. Generalized weakness        2. Generalized abdominal pain        3. Nausea and vomiting, unspecified vomiting type        4. Urinary retention        5. Hyponatremia        6. New onset atrial fibrillation (Multi)   Transthoracic Echo Complete    Transthoracic Echo Complete      7. Atrial fibrillation, persistent (Multi)  Transthoracic Echo Complete    Transthoracic Echo Complete        OT Visit Info:  OT Received On: 06/02/25  General Visit Info:  General  Reason for Referral: impaired self-care d/t admission for generalized weakness  Referred By: Jung Esparza DO  Past Medical History Relevant to Rehab: HTN, anemia, detached retina s/p repair  Family/Caregiver Present: No  Prior to Session Communication: Bedside nurse  Patient Position Received: Bed, 2 rail up, Alarm off, caregiver present (RN present passing meds)  General Comment: Pt. was pleasant and cooperative with OT evaluation.  Precautions:  Medical Precautions: Fall precautions  Precautions Comment: masimo, tele, IV, powell     Date/Time Vitals Session Patient Position Pulse Resp SpO2 BP MAP (mmHg)    06/02/25 0845 Pre OT  Sitting  86  --  98 %  --  --     06/02/25 0900 --  --  78  18  100 %  124/67  84     06/02/25 1000 --  --  71  19  99 %  80/58  65           Pain:  Pain Assessment  Pain Assessment: 0-10  0-10 (Numeric) Pain Score: 0 - No pain    Objective   Cognition:  Overall Cognitive Status: Within Functional Limits  Arousal/Alertness: Appropriate responses to stimuli  Orientation Level: Oriented X4  Home Living:  Type of Home: Mobile home  Lives With: Alone  Home Layout: One level  Home Access: Stairs to enter with rails  Entrance Stairs-Rails: Both  Entrance Stairs-Number of Steps: 3  Bathroom Shower/Tub: Walk-in shower  Bathroom Toilet: Handicapped height  Bathroom Equipment: Grab bars in shower, Built-in shower seat, Grab bars around toilet   Prior Function:  Level of [place]: Independent with ADLs and functional transfers, Independent with homemaking with ambulation  ADL Assistance: Independent  Homemaking Assistance: Independent  Ambulatory Assistance: Independent  Hand Dominance: Right  IADL History:  Homemaking Responsibilities: Yes  Meal Prep  Responsibility: Primary  Laundry Responsibility: Primary  Cleaning Responsibility: Primary  Bill Paying/Finance Responsibility: Primary  Shopping Responsibility: Primary  Current License: Yes  Leisure and Hobbies: [employer] workout videos  ADL:  Eating Assistance: Independent (anticipated)  Grooming Assistance: Minimal (anticipated)  Bathing Assistance: Moderate (anticipated)  UE Dressing Assistance: Stand by (anticipated)  LE Dressing Assistance: Stand by  LE Dressing Deficit: Don/doff R sock, Don/doff L sock  Toileting Assistance with Device: Total  Toileting Deficit: Urinary Catheter  Activity Tolerance:  Endurance: Decreased tolerance for upright activites (noted SOB with talking and ambulation)  Bed Mobility/Transfers: Bed Mobility  Bed Mobility: No    Transfers  Transfer: Yes  Transfer 1  Transfer From 1: Bed to  Transfer to 1: Chair with arms  Technique 1: Sit to stand, Stand to sit  Transfer Level of Assistance 1: Contact guard  Functional Mobility:  Functional Mobility  Functional Mobility Performed: Yes  Functional Mobility 1  Surface 1: Level tile  Device 1: No device  Assistance 1: Contact guard  Comments 1: 3 steps to chair; pt. utilized furniture for balance  Sitting Balance:  Static Sitting Balance  Static Sitting-Balance Support: Feet supported  Static Sitting-Level of Assistance: Independent  Dynamic Sitting Balance  Dynamic Sitting-Balance Support: Feet supported  Dynamic Sitting-Level of Assistance: Independent  Dynamic Sitting-Balance: Forward lean  Standing Balance:  Static Standing Balance  Static Standing-Balance Support: Bilateral upper extremity supported  Static Standing-Level of Assistance: Contact guard  Dynamic Standing Balance  Dynamic Standing-Balance Support: Bilateral upper extremity supported  Dynamic Standing-Level of Assistance: Contact guard  Dynamic Standing-Balance: Turning  IADL's:   Homemaking Responsibilities: Yes  Meal Prep Responsibility: Primary  Laundry Responsibility:  Primary  Cleaning Responsibility: Primary  Bill Paying/Finance Responsibility: Primary  Shopping Responsibility: Primary  Current License: Yes  Leisure and Hobbies: [employer] workout videos  Vision: Vision - Basic Assessment  Visual History:  (retinal detachment with repair)  Sensation:  Sensation Comment: denies deficits  Strength:  Strength Comments: BUE: 4-/5 grossly throughout  Coordination:  Movements are Fluid and Coordinated: Yes   Hand Function:  Hand Function  Gross Grasp: Functional  Coordination: Functional  Extremities: RUE   RUE : Within Functional Limits and LUE   LUE: Within Functional Limits    Outcome Measures: [facility] Daily Activity  Putting on and taking off regular lower body clothing: A little  Bathing (including washing, rinsing, drying): A lot  Putting on and taking off regular upper body clothing: A little  Toileting, which includes using toilet, bedpan or urinal: Total  Taking care of personal grooming such as brushing teeth: A little  Eating Meals: None  Daily Activity - Total Score: 16     and OT Adult Other Outcome Measures  4AT: 0  Patient scored 0 on 4AT indicatin considered delirium or severe cognitive impairment unlikely.    This interpretation should not be used as a medical diagnosis and further medical assessment would be required.    Patient's 4AT score:  Alertness: 0 - Normal (fully alert, but not agitated, throughout assessment)   AMT4: 0 - No mistakes   Attention: 0 - Achieves 7 months or more correctly   Acute change or fluctuating course: 0 - No    4 AT Score: 0/12     4 AT is a standardized assessment completed with patient to assess delirium. The 4AT assesses 4 items including alertness, abbreviated mental test (AMT4), Attention, and acute change or fluctuating course.     The test is scored out of 12 points. Score indications are as follows:   0: delirium or severe cognitive impairment unlikely   (delirium is still possible if acute change or fluctuating course information  is incomplete and prior cognitive status is not obtained)  1-3: possible cognitive impairment  4 or above: possible delirium +/- cognitive impairment     The 4AT is scored as follows:   Alertness:   Normal (fully alert, but not agitated, throughout assessment) 0  Mild Sleepiness for <10 seconds after waking, then normal   0  Clearly Abnormal       4    AMT4: (age, date of birth, place (name of hospital or building), current year)  No mistakes     0  1 mistake    1  2 or more mistakes/untestable 2    Attention: (months of they year in backwards order starting with December)  Achieves 7 months or more correctly     0  Starts but scores <7 months/refuses to start    1  Untestable (cannot start because unwell, drowsy, inattentive) 2    Acute change or fluctuating Course: (change in cognition or mental function in last 2 wks and is still evident in last 24 hrs.   No  0  Yes  4        Education Documentation  Body Mechanics, taught by Pam Medrano OT at 6/2/2025 10:38 AM.  Learner: Patient  Readiness: Acceptance  Method: Explanation  Response: Verbalizes Understanding  Comment: Edu on POC and DC rec. Edu on transfers, balance, and safety with mobility and line management.    ADL Training, taught by Pam Medrano OT at 6/2/2025 10:38 AM.  Learner: Patient  Readiness: Acceptance  Method: Explanation  Response: Verbalizes Understanding  Comment: Edu on POC and DC rec. Edu on transfers, balance, and safety with mobility and line management.    Education Comments  No comments found.      Goals:   Encounter Problems       Encounter Problems (Active)       ADLs       Patient will perform UB and LB bathing with stand by assist level of assistance.       Start:  06/02/25    Expected End:  06/16/25            Patient with complete upper body dressing with modified independent level of assistance donning and doffing all UE clothes with PRN adaptive equipment while supported sitting       Start:  06/02/25    Expected End:  06/16/25             Patient with complete lower body dressing with modified independent level of assistance donning and doffing all LE clothes  with PRN adaptive equipment while supported sitting       Start:  06/02/25    Expected End:  06/16/25            Patient will complete daily grooming tasks brushing teeth and washing face/hair with modified independent level of assistance and PRN adaptive equipment while standing.       Start:  06/02/25    Expected End:  06/16/25            Patient will complete toileting including hygiene clothing management/hygiene with minimal assist  level of assistance.       Start:  06/02/25    Expected End:  06/16/25               MOBILITY       Patient will perform Functional mobility mod  Household distances/Community Distances with modified independent level of assistance and least restrictive device in order to improve safety and functional mobility.       Start:  06/02/25    Expected End:  06/16/25               TRANSFERS       Patient will complete functional transfer to chair, bed, commode with least restrictive device with modified independent level of assistance.       Start:  06/02/25    Expected End:  06/16/25

## 2025-06-03 LAB
ANION GAP SERPL CALC-SCNC: 16 MMOL/L (ref 10–20)
ANION GAP SERPL CALC-SCNC: 16 MMOL/L (ref 10–20)
BUN SERPL-MCNC: 58 MG/DL (ref 6–23)
BUN SERPL-MCNC: 60 MG/DL (ref 6–23)
CALCIUM SERPL-MCNC: 7.7 MG/DL (ref 8.6–10.3)
CALCIUM SERPL-MCNC: 7.9 MG/DL (ref 8.6–10.3)
CHLORIDE SERPL-SCNC: 92 MMOL/L (ref 98–107)
CHLORIDE SERPL-SCNC: 92 MMOL/L (ref 98–107)
CO2 SERPL-SCNC: 16 MMOL/L (ref 21–32)
CO2 SERPL-SCNC: 17 MMOL/L (ref 21–32)
CREAT SERPL-MCNC: 3.08 MG/DL (ref 0.5–1.05)
CREAT SERPL-MCNC: 3.2 MG/DL (ref 0.5–1.05)
D DIMER PPP FEU-MCNC: 5335 NG/ML FEU
EGFRCR SERPLBLD CKD-EPI 2021: 14 ML/MIN/1.73M*2
EGFRCR SERPLBLD CKD-EPI 2021: 15 ML/MIN/1.73M*2
ERYTHROCYTE [DISTWIDTH] IN BLOOD BY AUTOMATED COUNT: 14.6 % (ref 11.5–14.5)
GLUCOSE BLD MANUAL STRIP-MCNC: 107 MG/DL (ref 74–99)
GLUCOSE BLD MANUAL STRIP-MCNC: 108 MG/DL (ref 74–99)
GLUCOSE BLD MANUAL STRIP-MCNC: 115 MG/DL (ref 74–99)
GLUCOSE BLD MANUAL STRIP-MCNC: 80 MG/DL (ref 74–99)
GLUCOSE SERPL-MCNC: 106 MG/DL (ref 74–99)
GLUCOSE SERPL-MCNC: 77 MG/DL (ref 74–99)
HCT VFR BLD AUTO: 20.9 % (ref 36–46)
HGB BLD-MCNC: 7.8 G/DL (ref 12–16)
IRON SATN MFR SERPL: 14 % (ref 25–45)
IRON SERPL-MCNC: 25 UG/DL (ref 35–150)
MCH RBC QN AUTO: 30.6 PG (ref 26–34)
MCHC RBC AUTO-ENTMCNC: 37.3 G/DL (ref 32–36)
MCV RBC AUTO: 82 FL (ref 80–100)
NRBC BLD-RTO: 0 /100 WBCS (ref 0–0)
PLATELET # BLD AUTO: 204 X10*3/UL (ref 150–450)
POTASSIUM SERPL-SCNC: 3.8 MMOL/L (ref 3.5–5.3)
POTASSIUM SERPL-SCNC: 4.1 MMOL/L (ref 3.5–5.3)
RBC # BLD AUTO: 2.55 X10*6/UL (ref 4–5.2)
SODIUM SERPL-SCNC: 120 MMOL/L (ref 136–145)
SODIUM SERPL-SCNC: 121 MMOL/L (ref 136–145)
TIBC SERPL-MCNC: 179 UG/DL (ref 240–445)
UIBC SERPL-MCNC: 154 UG/DL (ref 110–370)
WBC # BLD AUTO: 15.1 X10*3/UL (ref 4.4–11.3)

## 2025-06-03 PROCEDURE — 2500000004 HC RX 250 GENERAL PHARMACY W/ HCPCS (ALT 636 FOR OP/ED): Mod: IPSPLIT | Performed by: NURSE PRACTITIONER

## 2025-06-03 PROCEDURE — 85379 FIBRIN DEGRADATION QUANT: CPT | Mod: IPSPLIT | Performed by: NURSE PRACTITIONER

## 2025-06-03 PROCEDURE — 80048 BASIC METABOLIC PNL TOTAL CA: CPT | Mod: IPSPLIT | Performed by: NURSE PRACTITIONER

## 2025-06-03 PROCEDURE — 2500000001 HC RX 250 WO HCPCS SELF ADMINISTERED DRUGS (ALT 637 FOR MEDICARE OP): Mod: IPSPLIT | Performed by: INTERNAL MEDICINE

## 2025-06-03 PROCEDURE — 82947 ASSAY GLUCOSE BLOOD QUANT: CPT | Mod: IPSPLIT

## 2025-06-03 PROCEDURE — 1100000001 HC PRIVATE ROOM DAILY: Mod: IPSPLIT

## 2025-06-03 PROCEDURE — 83540 ASSAY OF IRON: CPT | Mod: IPSPLIT | Performed by: NURSE PRACTITIONER

## 2025-06-03 PROCEDURE — 36415 COLL VENOUS BLD VENIPUNCTURE: CPT | Mod: IPSPLIT | Performed by: NURSE PRACTITIONER

## 2025-06-03 PROCEDURE — 2500000001 HC RX 250 WO HCPCS SELF ADMINISTERED DRUGS (ALT 637 FOR MEDICARE OP): Mod: IPSPLIT | Performed by: NURSE PRACTITIONER

## 2025-06-03 PROCEDURE — 97530 THERAPEUTIC ACTIVITIES: CPT | Mod: GO,IPSPLIT | Performed by: OCCUPATIONAL THERAPIST

## 2025-06-03 PROCEDURE — 97116 GAIT TRAINING THERAPY: CPT | Mod: GP,CQ,IPSPLIT

## 2025-06-03 PROCEDURE — 94760 N-INVAS EAR/PLS OXIMETRY 1: CPT | Mod: IPSPLIT

## 2025-06-03 PROCEDURE — 97535 SELF CARE MNGMENT TRAINING: CPT | Mod: GO,IPSPLIT | Performed by: OCCUPATIONAL THERAPIST

## 2025-06-03 PROCEDURE — 97110 THERAPEUTIC EXERCISES: CPT | Mod: GP,CQ,IPSPLIT

## 2025-06-03 PROCEDURE — 99232 SBSQ HOSP IP/OBS MODERATE 35: CPT | Performed by: NURSE PRACTITIONER

## 2025-06-03 PROCEDURE — 85027 COMPLETE CBC AUTOMATED: CPT | Mod: IPSPLIT | Performed by: NURSE PRACTITIONER

## 2025-06-03 PROCEDURE — 99233 SBSQ HOSP IP/OBS HIGH 50: CPT | Performed by: NURSE PRACTITIONER

## 2025-06-03 RX ORDER — ACETAMINOPHEN 325 MG/1
650 TABLET ORAL EVERY 4 HOURS PRN
Status: DISCONTINUED | OUTPATIENT
Start: 2025-06-03 | End: 2025-06-09 | Stop reason: HOSPADM

## 2025-06-03 RX ORDER — NYSTATIN 100000 [USP'U]/ML
5 SUSPENSION ORAL 4 TIMES DAILY
Status: DISCONTINUED | OUTPATIENT
Start: 2025-06-03 | End: 2025-06-09 | Stop reason: HOSPADM

## 2025-06-03 RX ORDER — CALCIUM CARBONATE 200(500)MG
500 TABLET,CHEWABLE ORAL 3 TIMES DAILY
Status: DISCONTINUED | OUTPATIENT
Start: 2025-06-03 | End: 2025-06-09 | Stop reason: HOSPADM

## 2025-06-03 RX ADMIN — APIXABAN 5 MG: 5 TABLET, FILM COATED ORAL at 20:23

## 2025-06-03 RX ADMIN — OXYCODONE 5 MG: 5 TABLET ORAL at 00:16

## 2025-06-03 RX ADMIN — OXYCODONE 5 MG: 5 TABLET ORAL at 21:55

## 2025-06-03 RX ADMIN — SENNOSIDES AND DOCUSATE SODIUM 2 TABLET: 50; 8.6 TABLET ORAL at 20:23

## 2025-06-03 RX ADMIN — NYSTATIN 500000 UNITS: 100000 SUSPENSION ORAL at 12:22

## 2025-06-03 RX ADMIN — NYSTATIN 500000 UNITS: 100000 SUSPENSION ORAL at 16:06

## 2025-06-03 RX ADMIN — CALCIUM CARBONATE (ANTACID) CHEW TAB 500 MG 1 TABLET: 500 CHEW TAB at 16:06

## 2025-06-03 RX ADMIN — APIXABAN 2.5 MG: 2.5 TABLET, FILM COATED ORAL at 09:08

## 2025-06-03 RX ADMIN — CALCIUM CARBONATE (ANTACID) CHEW TAB 500 MG 1 TABLET: 500 CHEW TAB at 20:23

## 2025-06-03 RX ADMIN — AMOXICILLIN AND CLAVULANATE POTASSIUM 1 TABLET: 500; 125 TABLET, FILM COATED ORAL at 09:08

## 2025-06-03 RX ADMIN — IRON SUCROSE 300 MG: 20 INJECTION, SOLUTION INTRAVENOUS at 10:52

## 2025-06-03 RX ADMIN — SODIUM BICARBONATE 650 MG: 650 TABLET ORAL at 16:06

## 2025-06-03 RX ADMIN — NYSTATIN 500000 UNITS: 100000 SUSPENSION ORAL at 20:23

## 2025-06-03 RX ADMIN — Medication 25 MCG: at 09:08

## 2025-06-03 RX ADMIN — CALCIUM CARBONATE (ANTACID) CHEW TAB 500 MG 1 TABLET: 500 CHEW TAB at 10:51

## 2025-06-03 RX ADMIN — AMOXICILLIN AND CLAVULANATE POTASSIUM 1 TABLET: 500; 125 TABLET, FILM COATED ORAL at 20:24

## 2025-06-03 RX ADMIN — SODIUM BICARBONATE 650 MG: 650 TABLET ORAL at 09:08

## 2025-06-03 RX ADMIN — METOPROLOL SUCCINATE 100 MG: 100 TABLET, EXTENDED RELEASE ORAL at 09:08

## 2025-06-03 RX ADMIN — SODIUM BICARBONATE 650 MG: 650 TABLET ORAL at 20:24

## 2025-06-03 ASSESSMENT — PAIN SCALES - GENERAL
PAINLEVEL_OUTOF10: 0 - NO PAIN
PAINLEVEL_OUTOF10: 0 - NO PAIN
PAINLEVEL_OUTOF10: 7
PAINLEVEL_OUTOF10: 0 - NO PAIN
PAINLEVEL_OUTOF10: 0 - NO PAIN
PAINLEVEL_OUTOF10: 6
PAINLEVEL_OUTOF10: 0 - NO PAIN

## 2025-06-03 ASSESSMENT — COGNITIVE AND FUNCTIONAL STATUS - GENERAL
MOVING TO AND FROM BED TO CHAIR: A LITTLE
MOBILITY SCORE: 19
DRESSING REGULAR LOWER BODY CLOTHING: A LITTLE
DAILY ACTIVITIY SCORE: 16
DRESSING REGULAR UPPER BODY CLOTHING: A LITTLE
STANDING UP FROM CHAIR USING ARMS: A LITTLE
MOVING FROM LYING ON BACK TO SITTING ON SIDE OF FLAT BED WITH BEDRAILS: A LITTLE
TURNING FROM BACK TO SIDE WHILE IN FLAT BAD: A LITTLE
WALKING IN HOSPITAL ROOM: A LITTLE
PERSONAL GROOMING: A LITTLE
TOILETING: TOTAL
HELP NEEDED FOR BATHING: A LOT

## 2025-06-03 ASSESSMENT — PAIN DESCRIPTION - LOCATION
LOCATION: BACK
LOCATION: BACK

## 2025-06-03 ASSESSMENT — PAIN - FUNCTIONAL ASSESSMENT
PAIN_FUNCTIONAL_ASSESSMENT: 0-10

## 2025-06-03 ASSESSMENT — PAIN DESCRIPTION - ORIENTATION
ORIENTATION: LOWER
ORIENTATION: MID

## 2025-06-03 ASSESSMENT — ACTIVITIES OF DAILY LIVING (ADL): HOME_MANAGEMENT_TIME_ENTRY: 15

## 2025-06-03 ASSESSMENT — PAIN DESCRIPTION - DESCRIPTORS
DESCRIPTORS: ACHING
DESCRIPTORS: ACHING

## 2025-06-03 NOTE — PROGRESS NOTES
Mireya Nava is a 74 y.o. female on day 4 of admission presenting with Generalized weakness.    Subjective   She is sitting up in a chair eating breakfast tolerating it well.  She feels good, getting stronger every day.  We discussed her sodium levels and kidney function, will continue to monitor.  All questions answered..     Objective     Physical Exam  Constitutional:       General: She is not in acute distress.     Appearance: Normal appearance. She is not toxic-appearing.   HENT:      Head: Normocephalic and atraumatic.      Mouth/Throat:      Mouth: Mucous membranes are moist.   Eyes:      Extraocular Movements: Extraocular movements intact.      Pupils: Pupils are equal, round, and reactive to light.   Cardiovascular:      Rate and Rhythm: Normal rate. Rhythm irregular.      Pulses: Normal pulses.      Heart sounds: Normal heart sounds. No murmur heard.     No gallop.   Pulmonary:      Effort: Pulmonary effort is normal. No respiratory distress.      Breath sounds: Normal breath sounds. No wheezing, rhonchi or rales.   Abdominal:      General: Bowel sounds are normal. There is no distension.      Palpations: Abdomen is soft.      Tenderness: There is no abdominal tenderness. There is no guarding or rebound.   Musculoskeletal:         General: No swelling, tenderness, deformity or signs of injury. Normal range of motion.      Cervical back: Normal range of motion and neck supple.   Skin:     General: Skin is warm and dry.      Capillary Refill: Capillary refill takes less than 2 seconds.      Coloration: Skin is not jaundiced.      Findings: No bruising or rash.   Neurological:      General: No focal deficit present.      Mental Status: She is alert and oriented to person, place, and time. Mental status is at baseline.      Cranial Nerves: No cranial nerve deficit.      Sensory: No sensory deficit.      Motor: No weakness.      Gait: Gait normal.   Psychiatric:         Mood and Affect: Mood normal.         " Behavior: Behavior normal.         Thought Content: Thought content normal.         Judgment: Judgment normal.       Last Recorded Vitals  Blood pressure 100/57, pulse 84, temperature 36.6 °C (97.9 °F), temperature source Temporal, resp. rate 17, height 1.626 m (5' 4\"), weight 74.9 kg (165 lb 2 oz), SpO2 95%.  Intake/Output last 3 Shifts:  I/O last 3 completed shifts:  In: 2152.5 (28.7 mL/kg) [P.O.:920; I.V.:1132.5 (15.1 mL/kg); IV Piggyback:100]  Out: 4080 (54.5 mL/kg) [Urine:4080 (1.5 mL/kg/hr)]  Weight: 74.9 kg     Scheduled medications  Scheduled Medications[1]  Continuous medications  Continuous Medications[2]  PRN medications  PRN Medications[3]    Relevant Results  XR chest 1 view  Result Date: 6/1/2025  1.  Cardiomegaly. Mild interstitial edema. Small left pleural effusion with airspace opacity at the left lung base, may be secondary to atelectasis or pneumonia.       MACRO: None.   Signed by: Myrtle Duque 6/1/2025 3:24 AM Dictation workstation:   [id number]    CT chest abdomen pelvis wo IV contrast  Result Date: 5/30/2025  Limited examination without intravenous contrast.   The appendix is not visualized, although right lower quadrant inflammation and free fluid centered around the cecum raise concern for possible acute appendicitis. No organized fluid collection. Please correlate with right lower quadrant tenderness.   Multiple fluid-filled small bowel within the pelvis could represent nonspecific enteritis. No bowel obstruction.   Small left pleural effusion without definite evidence of pneumonia.   4 cm ascending thoracic aortic aneurysm.   MACRO Shaenl Valencia discussed the significance and urgency of this critical finding by Epic secure chat with  DARA WELCH on 5/30/2025 at 2:03 pm.  (**-RCF-**) Findings:  See findings.   Signed by: Shanel Valencia 5/30/2025 2:05 PM Dictation workstation:   [id number]     Latest Reference Range & Units 06/02/25 14:12 06/02/25 20:01 06/03/25 04:57   GLUCOSE 74 " - 99 mg/dL 104 (H) 95 77   SODIUM 136 - 145 mmol/L 116 (LL) 117 (LL) 120 (LL)   POTASSIUM 3.5 - 5.3 mmol/L 4.0 3.9 3.8   CHLORIDE 98 - 107 mmol/L 87 (L) 88 (L) 92 (L)   Bicarbonate 21 - 32 mmol/L 18 (L) 16 (L) 16 (L)   Anion Gap 10 - 20 mmol/L 15 17 16   Blood Urea Nitrogen 6 - 23 mg/dL 56 (H) 57 (H) 60 (H)   Creatinine 0.50 - 1.05 mg/dL 3.24 (H) 3.23 (H) 3.20 (H)   EGFR >60 mL/min/1.73m*2 14 (L) 14 (L) 14 (L)   Calcium 8.6 - 10.3 mg/dL 7.8 (L) 7.6 (L) 7.7 (L)   BNP 0 - 99 pg/mL 1,041 (H)     WBC 4.4 - 11.3 x10*3/uL   15.1 (H)   nRBC 0.0 - 0.0 /100 WBCs   0.0   RBC 4.00 - 5.20 x10*6/uL   2.55 (L)   HEMOGLOBIN 12.0 - 16.0 g/dL   7.8 (L)   HEMATOCRIT 36.0 - 46.0 %   20.9 (L)   MCV 80 - 100 fL   82   MCH 26.0 - 34.0 pg   30.6   MCHC 32.0 - 36.0 g/dL   37.3 (H)   RED CELL DISTRIBUTION WIDTH 11.5 - 14.5 %   14.6 (H)   Platelets 150 - 450 x10*3/uL   204     Assessment & Plan  Primary hypertension    Hyperlipidemia    Hypo-osmolar hyponatremia    TAMMY (acute kidney injury)    Atrial fibrillation (Multi)    UTI (urinary tract infection)    Acute urinary retention      Severe hypo-osmolar hyponatremia  - Patient is alert and oriented to self, birthday, president and year and appears mentating well.  She does have some generalized weakness but no obvious gross focal neurologic deficits.  - 5/30 serum osmol 251  - TSH 0.58  - Na 109>111>112>114>113>120  - cortisol 28.5  - urine Na 22 - consistent with hypovolemic hyponatremia, likely secondary to dehydration  - patient is not on any obvious home medications to cause hyponatremia.  - Sodium was 130 on 5/30/2024.  - 3% hypertonic saline at 15 mL/h for 4 hours x 2  - ICU monitoring  - Goal to achieve a 24-hour increase in serum sodium of 4 to 6 mEq/L.   - seizure precautions.  - Q6 BMP  - appetite at baseline     Acute kidney injury  - Likely secondary to dehydration with decreased oral intake.  - creat 2.68 > 2.88 > 3.25 > 3.21 > 3.20  - urine creat 71.1 > 34.8  - urine sodium  23, Fena calculated showing likely pre-renal cause  - Patient given 1 L of IV fluids in the ER.  As above, will place on hypertonic saline at 15 mL/h for 4 hours and check BMPs every 4 hours (complete)  - 5/31 received D5 .45 overnight - bolus 100ml 3% NS this am and then 0.9NaCl @125ml/hr and monitor q4hr BMP   - CT scan of the abdomen pelvis with no signs of hydronephrosis, powell placed for retention  - Hold home medication of losartan with acute kidney injury.  - daily BMP     Nonspecific enteritis  - Dr. Garner consult, appreciate recs, feels no surgical need at this time   - tolerating diet  - WBC 19.8>18.8>19.3>19.9>15.1  - blood culture  gram (-) neg bacilli aerobic and anaerobic bottles - change to zosyn  - ID consult     New onset atrial fibrillation  - continue metoprolol  - heparin gtt > apixaban  - echo: 1. Left ventricular ejection fraction is normal by visual estimate at 65-70%.  2. There is normal right ventricular global systolic function.  3. Mildly enlarged right ventricle.  4. The left atrium is mildly dilated.  5. Moderate mitral valve regurgitation.  6. Moderate to severe tricuspid regurgitation visualized.  7. The doppler estimated RVSP is within normal limits with a right ventricular systolic pressure of 30 mmHg.  8. Mild aortic valve regurgitation.  9. A bubble study using agitated saline was performed. Bubble study is positive. A large PFO (> 20 bubbles) was demonstrated  - Consult cardiology, appreciate recs     UTI  - Rocephin and follow urine culture, changed to zosyn  - no nitrites in the urine - unlikely source   - consult ID, appreciate recs      Urinary retention  - Powell catheter   - strict IO     Hypertension  - Hold losartan 2/2 TAMMY  - Continue metoprolol  mg daily  - hold amlodipine 10 mg daily until normotensive      *4 cm ascending thoracic aortic aneurysm found incidentally on CT scan  - Recommend follow-up with primary care provider and cardiothoracic surgery as an  outpatient.     GI ppx: PPI  DVT ppx: heparin gtt > apixaban  Fluids: PRN  Electrolytes: replace as needed  Nutrition: reg diet  Adjuncts: PIV  Code Status: full  Pt requires inpatient stay at this time.    Sandy Danielle, ARNULFO-CNP         [1] [Held by provider] amLODIPine, 10 mg, oral, Daily  amoxicillin-clavulanate, 1 tablet, oral, BID  apixaban, 2.5 mg, oral, BID  calcium carbonate, 500 mg of calcium carbonate, oral, TID  cholecalciferol, 25 mcg, oral, Daily  insulin lispro, 0-10 Units, subcutaneous, TID AC  iron sucrose, 300 mg, intravenous, Once  [Held by provider] losartan, 100 mg, oral, Daily  metoprolol succinate XL, 100 mg, oral, Daily  nystatin, 5 mL, Swish & Spit, 4x daily  sennosides-docusate sodium, 2 tablet, oral, BID  sodium bicarbonate, 650 mg, oral, TID     [2] sodium chloride 0.9%, 10 mL/hr     [3] PRN medications: acetaminophen **OR** [DISCONTINUED] acetaminophen **OR** [DISCONTINUED] acetaminophen, dextromethorphan-guaifenesin, ipratropium-albuteroL, magnesium hydroxide, oxyCODONE, oxygen, sodium chloride 0.9%

## 2025-06-03 NOTE — PROGRESS NOTES
Mireya Nava is a 74 y.o. female on day 4 of admission presenting with Generalized weakness.    Subjective   Interval History:   Afebrile, no chills  Daughter present  No cough, chest pain or shortness of breath  No nausea vomiting or diarrhea        Review of Systems   All other systems reviewed and are negative.      Objective   Range of Vitals (last 24 hours)  Heart Rate:  [66-85]   Temp:  [36.3 °C (97.3 °F)-36.7 °C (98.1 °F)]   Resp:  [12-24]   BP: ()/(29-97)   Weight:  [74.9 kg (165 lb 2 oz)]   SpO2:  [89 %-100 %]   Daily Weight  06/03/25 : 74.9 kg (165 lb 2 oz)    Body mass index is 28.34 kg/m².    Physical Exam  Constitutional:       Appearance: Normal appearance.   HENT:      Head: Normocephalic and atraumatic.      Right Ear: External ear normal.      Left Ear: External ear normal.      Nose: Nose normal.   Eyes:      General: No scleral icterus.     Extraocular Movements: Extraocular movements intact.      Conjunctiva/sclera: Conjunctivae normal.   Cardiovascular:      Rate and Rhythm: Normal rate and regular rhythm.   Musculoskeletal:      Cervical back: Normal range of motion and neck supple.   Neurological:      Mental Status: She is alert.     Antibiotics  amoxicillin-clavulanate - 500-125 mg  nystatin - 100,000 unit/mL    Relevant Results  Labs  Results from last 72 hours   Lab Units 06/03/25  0457 06/02/25  0534 06/01/25  0417   WBC AUTO x10*3/uL 15.1* 18.6* 19.9*   HEMOGLOBIN g/dL 7.8* 8.2* 8.2*   HEMATOCRIT % 20.9* 22.0* 21.5*   PLATELETS AUTO x10*3/uL 204 185 158     Results from last 72 hours   Lab Units 06/03/25  0457 06/02/25 2001 06/02/25  1412   SODIUM mmol/L 120* 117* 116*   POTASSIUM mmol/L 3.8 3.9 4.0   CHLORIDE mmol/L 92* 88* 87*   CO2 mmol/L 16* 16* 18*   BUN mg/dL 60* 57* 56*   CREATININE mg/dL 3.20* 3.23* 3.24*   GLUCOSE mg/dL 77 95 104*   CALCIUM mg/dL 7.7* 7.6* 7.8*   ANION GAP mmol/L 16 17 15   EGFR mL/min/1.73m*2 14* 14* 14*     Results from last 72 hours   Lab Units  "06/02/25  0534 06/01/25  0417   ALK PHOS U/L 175* 137*   BILIRUBIN TOTAL mg/dL 0.7 0.6   PROTEIN TOTAL g/dL 5.0* 4.8*   ALT U/L 15 16   AST U/L 11 9   ALBUMIN g/dL 2.2* 2.2*     Estimated Creatinine Clearance: 14.8 mL/min (A) (by C-G formula based on SCr of 3.2 mg/dL (H)).  No results found for: \"CRP\"  Microbiology  Susceptibility data from last 14 days.  Collected Specimen Info Organism Ampicillin Cefazolin Cefazolin (uncomplicated UTIs only) Ciprofloxacin Gentamicin Levofloxacin Nitrofurantoin Piperacillin/Tazobactam Trimethoprim/Sulfamethoxazole   05/30/25 Urine from Clean Catch/Voided Escherichia coli  S  S  S  S  S  S  S  S  S   05/30/25 Blood culture from Peripheral Venipuncture Escherichia coli            05/30/25 Blood culture from Peripheral Venipuncture Escherichia coli  S  S   S  S  S   S  S     Imaging  Transthoracic Echo Complete  Result Date: 6/2/2025   [facility], [address] [place] [address]              Tel [phone] and Fax [phone] TRANSTHORACIC ECHOCARDIOGRAM REPORT  Patient Name:       FANNY BREEZY DARLING   Reading Physician:    [id number] Rosanna Arevalo MD Study Date:         6/2/2025            Ordering Provider:    [id number] ELICEO WONG MRN/PID:            [id number]            Fellow: Accession#:         [id number]        Nurse:                Bev Luna RN Date of Birth/Age:  1950 / 74      Sonographer:          Aria Dixon RDCS                     years Gender assigned at  F                   Additional Staff: Birth: Height:             162.56 cm           Admit Date: Weight:             78.93 kg            Admission Status:     Inpatient -                                                               Routine BSA / BMI:          1.84 m2 / 29.87     Encounter#:           [id number]                     kg/m2 Blood Pressure:     108/61 " mmHg         Department Location:  [facility] Study Type:    TRANSTHORACIC ECHO (TTE) COMPLETE Diagnosis/ICD: Unspecified atrial fibrillation-I48.91 Indication:    AFib CPT Code:      Echo Complete w Full Doppler-86301 Patient History: Pertinent History: A-Fib, HTN, Hyperlipidemia and Murmur. Weakness. Study Detail: The following Echo studies were performed: 2D, M-Mode, Doppler and               color flow. Agitated saline used as a contrast agent for               intraseptal flow evaluation. The patient was awake.  PHYSICIAN INTERPRETATION: Left Ventricle: Left ventricular ejection fraction is normal by visual estimate at 65-70%. There are no regional left ventricular wall motion abnormalities. The left ventricular cavity size is normal. There is mildly increased septal and mildly increased posterior left ventricular wall thickness. There is left ventricular concentric remodeling. Spectral Doppler shows a normal pattern of left ventricular diastolic filling. Left Atrium: The left atrium is mildly dilated. The patent foramen ovale was visualized using agitated saline contrast. A bubble study using agitated saline was performed. Bubble study is positive. A large PFO (> 20 bubbles) was demonstrated. Right Ventricle: The right ventricle is mildly enlarged. There is normal right ventricular global systolic function. Right Atrium: The right atrium is mildly dilated. Aortic Valve: There is mild aortic valve regurgitation. Mitral Valve: The mitral valve is normal in structure. There is moderate mitral valve regurgitation. The E Vmax is 1.02 m/s. Tricuspid Valve: The tricuspid valve is structurally normal. There is moderate to severe tricuspid regurgitation. The doppler estimated RVSP is within normal limits with a right ventricular systolic pressure of 30 mmHg. Pulmonic Valve: The pulmonic valve is structurally normal. There is physiologic pulmonic valve regurgitation. Pericardium: There is no pericardial effusion noted.  Aorta: The aortic root is normal.  CONCLUSIONS:  1. Left ventricular ejection fraction is normal by visual estimate at 65-70%.  2. There is normal right ventricular global systolic function.  3. Mildly enlarged right ventricle.  4. The left atrium is mildly dilated.  5. Moderate mitral valve regurgitation.  6. Moderate to severe tricuspid regurgitation visualized.  7. The doppler estimated RVSP is within normal limits with a right ventricular systolic pressure of 30 mmHg.  8. Mild aortic valve regurgitation.  9. A bubble study using agitated saline was performed. Bubble study is positive. A large PFO (> 20 bubbles) was demonstrated. QUANTITATIVE DATA SUMMARY:  2D MEASUREMENTS:          Normal Ranges: Ao Root d:       3.20 cm  (2.0-3.7cm) LAs:             3.90 cm  (2.7-4.0cm) IVSd:            1.20 cm  (0.6-1.1cm) LVPWd:           1.19 cm  (0.6-1.1cm) LVIDd:           3.69 cm  (3.9-5.9cm) LVIDs:           2.39 cm LV Mass Index:   79 g/m2 LVEDV Index:     44 ml/m2 LV % FS          35.2 %  LEFT ATRIUM:                  Normal Ranges: LA Vol A4C:        41.6 ml    (22+/-6mL/m2) LA Vol A2C:        69.7 ml LA Vol BP:         61.0 ml LA Vol Index A4C:  22.6ml/m2 LA Vol Index A2C:  37.8 ml/m2 LA Vol Index BP:   33.1 ml/m2 LA Area A4C:       15.0 cm2 LA Area A2C:       22.0 cm2 LA Major Axis A4C: 4.6 cm LA Major Axis A2C: 5.9 cm  RIGHT ATRIUM:                 Normal Ranges: RA Vol A4C:        31.9 ml    (8.3-19.5ml) RA Vol Index A4C:  17.3 ml/m2 RA Area A4C:       15.0 cm2 RA Major Axis A4C: 6.0 cm  AORTA MEASUREMENTS:         Normal Ranges: Asc Ao, d:          3.50 cm (2.1-3.4cm)  LV SYSTOLIC FUNCTION:                      Normal Ranges: EF-A4C View:    71 % (>=55%) EF-A2C View:    71 % EF-Biplane:     72 % EF-Visual:      68 % LV EF Reported: 68 %  LV DIASTOLIC FUNCTION:            Normal Ranges: MV Peak E:             1.02 m/s   (0.7-1.2 m/s) MV e'                  0.151 m/s  (>8.0) MV lateral e'          0.18 m/s MV  medial e'           0.13 m/s E/e' Ratio:            6.75       (<8.0) PulmV Sys Danny:         38.10 cm/s PulmV Godfrey Danny:        46.90 cm/s PulmV S/D Danny:         0.80  MITRAL VALVE:          Normal Ranges: MV DT:        213 msec (150-240msec)  AORTIC VALVE:            Normal Ranges: AoV Vmax:      1.80 m/s  (<=1.7m/s) AoV Peak P.0 mmHg (<20mmHg) LVOT Max Danny:  1.12 m/s  (<=1.1m/s) LVOT VTI:      19.80 cm LVOT Diameter: 2.00 cm   (1.8-2.4cm) AoV Area,Vmax: 1.95 cm2  (2.5-4.5cm2)  AORTIC INSUFFICIENCY: AI Vmax:       3.18 m/s AI Half-time:  615 msec AI Decel Rate: 152.00 cm/s2  RIGHT VENTRICLE: RV Basal 4.70 cm RV Mid   3.90 cm RV Major 7.9 cm TAPSE:   24.4 mm RV s'    0.13 m/s  TRICUSPID VALVE/RVSP:          Normal Ranges: Peak TR Velocity:     2.58 m/s Est. RA Pressure:     3 RV Syst Pressure:     30       (< 30mmHg) IVC Diam:             1.48 cm  PULMONIC VALVE:          Normal Ranges: PV Max Danny:     1.3 m/s  (0.6-0.9m/s) PV Max P.4 mmHg  PULMONARY VEINS: PulmV Godfrey Danny: 46.90 cm/s PulmV S/D Danny:  0.80 PulmV Sys Danny:  38.10 cm/s  [id number] Rosanna Arevalo MD Electronically signed on 2025 at 9:27:59 PM  ** Final **     XR abdomen 1 view  Result Date: 2025  Interpreted By:  Aaron Hanna, STUDY: XR ABDOMEN 1 VIEW;  2025 4:19 pm   INDICATION: Signs/Symptoms:n/v.     COMPARISON: CT chest abdomen and pelvis 2020   ACCESSION NUMBER(S): [id number]   ORDERING CLINICIAN: LUKE YEUNG   FINDINGS: Nonobstructive bowel gas pattern. Limited evaluation of pneumoperitoneum on supine imaging, however no gross evidence of free air is noted.   Visualized lungs are clear.   Osseous structures demonstrate no acute bony changes.       1.  Unremarkable exam.   MACRO: None   Signed by: Aaron Hanna 2025 4:30 PM Dictation workstation:   [id number]    Electrocardiogram, 12-lead PRN ACS symptoms  Result Date: 2025  Sinus rhythm with Premature atrial complexes Otherwise normal ECG  When compared with ECG of 30-MAY-2025 11:11, (unconfirmed) Sinus rhythm has replaced Atrial fibrillation    ECG 12 lead  Result Date: 6/2/2025  Atrial fibrillation Abnormal ECG When compared with ECG of 27-DEC-2005 11:33, Atrial fibrillation has replaced Sinus rhythm Vent. rate has increased BY  31 BPM Nonspecific T wave abnormality now evident in Anterior leads    XR chest 1 view  Result Date: 6/1/2025  Interpreted By:  Myrtle Duque, STUDY: XR CHEST 1 VIEW;  6/1/2025 1:37 am   INDICATION: Signs/Symptoms:change in breathing     COMPARISON: CT chest, abdomen, pelvis 05/30/2025.   ACCESSION NUMBER(S): [id number]   ORDERING CLINICIAN: ALEXA PROCTOR   TECHNIQUE: Portable upright frontal view of the chest was obtained .   FINDINGS: Monitoring leads are overlying the patient.   The heart is enlarged. There is mild interstitial edema.   There is a small left pleural effusion with airspace opacity at the left lung base. No pneumothorax.       1.  Cardiomegaly. Mild interstitial edema. Small left pleural effusion with airspace opacity at the left lung base, may be secondary to atelectasis or pneumonia.       MACRO: None.   Signed by: Myrtle Duque 6/1/2025 3:24 AM Dictation workstation:   [id number]    CT chest abdomen pelvis wo IV contrast  Result Date: 5/30/2025  Interpreted By:  Shanel Valencia, STUDY: CT CHEST ABDOMEN PELVIS WO CONTRAST;  5/30/2025 12:50 pm   INDICATION: Signs/Symptoms:cough, abd pain.   COMPARISON: None.   ACCESSION NUMBER(S): [id number]   ORDERING CLINICIAN: DARA WELCH   TECHNIQUE: CT of the chest, abdomen, and pelvis was performed without intravenous contrast.   FINDINGS:   CHEST:   Lines/Devices: None   Lungs: The trachea and central airways are patent without endobronchial lesions. There is a small left pleural effusion with adjacent compressive atelectasis. No focal consolidation, pulmonary edema, pneumothorax or suspicious nodule.   Vasculature: Mild dilation of the thoracic aorta  in the ascending segment measuring 4 cm. The main pulmonary artery is normal in size. Mild coronary artery calcifications noted in the LAD.   Heart: Heart size is normal. No pericardial effusion.   Mediastinum and lyudmila: No pathologically enlarged thoracic lymphadenopathy. The esophagus is unremarkable.   Chest wall and lower neck: No significant chest wall soft tissue abnormalities. The visualized thyroid is normal.   ABDOMEN/PELVIS:   Liver: No mass. Hepatomegaly measuring 20 cm in the craniocaudal dimension.   Biliary: No intrahepatic or extrahepatic bile duct dilation. The gallbladder is collapsed and limited for evaluation.   Spleen: No mass. No splenomegaly.   Pancreas: No mass, main duct dilation or acute inflammation.   Adrenals: Normal.   Kidneys: No calculus or hydronephrosis.   GI tract: There is a small amount of free fluid and fat stranding centered around the cecum. The appendix is not identified. No pericecal free air or organized fluid collection. There are multiple loops of fluid-filled, nondilated small bowel in the pelvis. No bowel obstruction or bowel wall thickening otherwise.   Lymph nodes: No abdominopelvic lymphadenopathy.   Mesentery/peritoneum: No free air or fluid collection. There is mild generalized haziness involving the peritoneum.   Vasculature: Moderate abdominal aortic atherosclerotic calcifications without aneurysmal dilation.   Pelvis: No free air or fluid collection. Trace free fluid in the dependent pelvis. The bladder is moderate distended but otherwise normal-appearing. The uterus appears grossly unremarkable.   Bones/Soft tissues: Mild levocurvature of the lumbar spine with multilevel thoracolumbar degenerative disc disease. Moderate to severe bilateral hip osteoarthritis. Mild abdominal wall edema.         Limited examination without intravenous contrast.   The appendix is not visualized, although right lower quadrant inflammation and free fluid centered around the cecum  raise concern for possible acute appendicitis. No organized fluid collection. Please correlate with right lower quadrant tenderness.   Multiple fluid-filled small bowel within the pelvis could represent nonspecific enteritis. No bowel obstruction.   Small left pleural effusion without definite evidence of pneumonia.   4 cm ascending thoracic aortic aneurysm.   MACRO Shanel Valencia discussed the significance and urgency of this critical finding by Epic secure chat with  DARA WELCH on 5/30/2025 at 2:03 pm.  (**-RCF-**) Findings:  See findings.   Signed by: Shanel Valencia 5/30/2025 2:05 PM Dictation workstation:   [id number]     Assessment/Plan   Sepsis secondary to E. coli bacteremia  E. coli bacteremic, possibly secondary to urinary tract infection  E. coli urinary tract infection  Acute kidney injury, marginal decrease  Leukocytosis, multifactorial       Augmentin  Monitor renal function  Supportive care  Monitor temperature and WBC  Long-term plan is total of 14 days of antibiotic therapy  Plan discussed with primary team     This is a complex infectious disease issue and the following was performed today (for more details please see the above note): Management decisions reflecting the added complexity (e.g., changes in antimicrobial therapy, infection control strategies).     Nemesio Hansen MD

## 2025-06-03 NOTE — NURSING NOTE
0730 Assumed nursing care for patient. Patient resting in bed comfortably on room air. Remains in SR.     0800 Patient up to chair for breakfast.     1015 Patient walking in unit with PT.

## 2025-06-03 NOTE — PROGRESS NOTES
Physical Therapy    Physical Therapy Treatment    Patient Name: Mireya Nava  MRN: [id number]  Department: [facility]  Room: 01/01-A  Today's Date: 6/3/2025  Time Calculation  Start Time: 0951  Stop Time: 1023  Time Calculation (min): 32 min         Assessment/Plan   PT Assessment  PT Assessment Results: Decreased strength, Impaired balance, Decreased mobility, Decreased endurance  Rehab Prognosis: Good  Barriers to Discharge Home: Caregiver assistance, Physical needs  Caregiver Assistance: Patient lives alone and/or does not have reliable caregiver assistance  Physical Needs: 24hr mobility assistance needed  Evaluation/Treatment Tolerance: Patient tolerated treatment well, Patient limited by fatigue  Medical Staff Made Aware: Yes  Strengths: Ability to acquire knowledge, Attitude of self, Rehab experience, Support of Caregivers  Barriers to Participation: Comorbidities  End of Session Communication: Bedside nurse  Assessment Comment: Pt cont to improve transfer safety to and from surfaces with cues required for ue sequencing and improved everton noted post cues. Pt demonstrated improved gait mechanics with CGA more than 75% of the time and min a needed during turns for fww.  Pt cont to require skilled PT to improve b le strength and endurnace, improve gait mechanics and safety and reduce risk for falls while preventing furhter decline while here in hospital. Pt left up in chair, alarm on. call bell within reach and all needs addressed.  End of Session Patient Position: Alarm on, Up in chair     PT Plan  Treatment/Interventions: Transfer training, Gait training, Strengthening, Endurance training, Range of motion, Therapeutic exercise, Therapeutic activity  PT Plan: Ongoing PT  PT Frequency: 3 times per week  PT Discharge Recommendations: Moderate intensity level of continued care  Equipment Recommended upon Discharge: Wheeled walker  PT Recommended Transfer Status: Assist x1  PT - OK to Discharge: Yes    PT Visit  Info:  PT Received On: 06/03/25  Response to Previous Treatment: Patient with no complaints from previous session.     General Visit Information:   General  Reason for Referral: impaired mobility, generalized weakness  Referred By: Jung Esparza DO  Past Medical History Relevant to Rehab: HTN, anemia, detached retina s/p repair  Prior to Session Communication: Bedside nurse  Patient Position Received: Up in chair, Alarm on  Preferred Learning Style: written  General Comment: Pt pleasant and agreeable to therapy with no reports of pain just feeling weak.  Pt cleared by nurse prior to session.    Subjective I am feeling much better.   Precautions:  Precautions  Medical Precautions: Fall precautions  Precautions Comment: masimo, tele, IV, powell     Date/Time Vitals Session Patient Position Pulse Resp SpO2 BP MAP (mmHg)    06/03/25 1000 --  --  84  17  95 %  100/57  70     06/03/25 1100 --  --  79  16  100 %  --  --                 Objective   Pain:  Pain Assessment  Pain Assessment: 0-10  0-10 (Numeric) Pain Score: 0 - No pain  Cognition:  Cognition  Overall Cognitive Status: Within Functional Limits  Arousal/Alertness: Appropriate responses to stimuli  Orientation Level: Oriented X4  Coordination:  Movements are Fluid and Coordinated: Yes  Activity Tolerance:  Activity Tolerance  Endurance: Tolerates 10 - 20 min exercise with multiple rests  Treatments:  Therapeutic Exercise  Therapeutic Exercise Performed: Yes  Therapeutic Exercise Activity 1: 20x PF/DF  Therapeutic Exercise Activity 2: 2x10 LAQ  Therapeutic Exercise Activity 3: 2x10 seated hip flex.  Therapeutic Exercise Activity 4: 2x10 hip abd long axis snow angels         Ambulation/Gait Training  Ambulation/Gait Training Performed: Yes  Ambulation/Gait Training 1  Surface 1: Level tile  Device 1: Rolling walker  Gait Support Devices: Gait belt  Assistance 1: Contact guard  Quality of Gait 1: Diminished heel strike, Inconsistent stride length, Decreased step  length, Shuffling gait, Soft knee(s)  Comments/Distance (ft) 1: 50-60 ft gait x 2 trials with CGA needed and min a x 2 during tight spaced turns due to narrow everton compensations.  Improved foot clearing steps noted post cues.  Transfers  Transfer: Yes  Transfer 1  Transfer From 1: Chair with arms to  Transfer to 1: Stand  Technique 1: Sit to stand, Stand to sit, Stand pivot  Transfer Device 1: Walker, Gait belt  Transfer Level of Assistance 1: Contact guard, Minimal verbal cues  Trials/Comments 1: multiple trials with cues for AD safety during turn and ue sequencing to and from surfaces.         Outcome Measures:  [facility] Basic Mobility  Turning from your back to your side while in a flat bed without using bedrails: A little  Moving from lying on your back to sitting on the side of a flat bed without using bedrails: A little  Moving to and from bed to chair (including a wheelchair): A little  Standing up from a chair using your arms (e.g. wheelchair or bedside chair): A little  To walk in hospital room: A little  Climbing 3-5 steps with railing: None  Basic Mobility - Total Score: 19    FSS-ICU  Ambulation: Walks >/ or equal to 150 feet with minimal assistance x1  Rolling: Modified independence, requires use of assistive device  Sitting: Modified independence, requires use of assistive device  Transfer Sit-to-Stand: Supervision or set-up only  Transfer Supine-to-Sit: Supervision or set-up only  Total Score: 26    Education Documentation  Mobility Training, taught by Kathy Colon PTA at 6/3/2025 11:43 AM.  Learner: Patient  Readiness: Acceptance  Method: Explanation  Response: Verbalizes Understanding  Comment: Education for safer UE sequencing and gait mechancis to reduce risk for falls.    Education Comments  No comments found.          Encounter Problems       Encounter Problems (Active)       Balance       STG - Maintains dynamic standing balance without upper extremity support with normal balance   (Progressing)       Start:  06/02/25    Expected End:  06/16/25               Mobility       LTG - Patient will ambulate community distance DEMAR with LRD  (Progressing)       Start:  06/02/25    Expected End:  06/16/25            LTG - Patient will navigate 3 steps with 2 rails/device IND       Start:  06/02/25    Expected End:  06/16/25               PT Transfers       STG - Patient will perform bed mobility IND (Progressing)       Start:  06/02/25    Expected End:  06/16/25            STG - Patient will transfer sit to and from stand DEMAR with LRD  (Progressing)       Start:  06/02/25    Expected End:  06/16/25            Pt. will complete 5 STS without UE support in <15 seconds  (Progressing)       Start:  06/02/25    Expected End:  06/16/25               Pain - Adult

## 2025-06-03 NOTE — PROGRESS NOTES
Occupational Therapy    Occupational Therapy Treatment    Name: Mireya Nava  MRN: [id number]  Department: [facility] ICU  Room: 01/01-A  Date: 06/03/25  Time Calculation  Start Time: 0803  Stop Time: 0828  Time Calculation (min): 25 min    Assessment:  OT Assessment: Pt. displayed improved mobility today with use of FWW. Cueing provided but not fluent. Incraesed standing toleance noted today. Cognition continues to be WFL despite slight hints of confusion noted during session. Continue to recommend MOD intensity OT to maximize function prior to return home.  Prognosis: Good  Barriers to Discharge Home: Caregiver assistance, Physical needs  Caregiver Assistance: Patient lives alone and/or does not have reliable caregiver assistance  Physical Needs: Stair navigation into home limited by function/safety, 24hr mobility assistance needed, Intermittent ADL assistance needed, High falls risk due to function or environment  Evaluation/Treatment Tolerance: Patient tolerated treatment well  Medical Staff Made Aware: Yes  End of Session Communication: Bedside nurse  End of Session Patient Position: Alarm on, Up in chair  Plan:  Treatment Interventions: ADL retraining, Functional transfer training, UE strengthening/ROM, Endurance training, Patient/family training, Equipment evaluation/education, Compensatory technique education, Neuromuscular reeducation  OT Frequency: 3 times per week  OT Discharge Recommendations: Moderate intensity level of continued care  Equipment Recommended upon Discharge: Wheeled walker  OT Recommended Transfer Status: Minimal assist, Assist of 1  OT - OK to Discharge: Yes (Based on completed evaluation and care plan recommendations, no barriers to discharge to next site of care)    Subjective     OT Visit Info:  OT Received On: 06/03/25  General:  General  Reason for Referral: impaired self-care d/t admission for generalized weakness  Referred By: Jung Esparza DO  Past Medical History Relevant to Rehab:  HTN, anemia, detached retina s/p repair  Family/Caregiver Present: No  Prior to Session Communication: Bedside nurse  Patient Position Received: Up in chair, Alarm on  Preferred Learning Style: written  General Comment: Pt. pleasant and cooperative with OT session.  Precautions:  Medical Precautions: Fall precautions  Precautions Comment: pretty goldstein IV, foley,     Date/Time Vitals Session Patient Position Pulse Resp SpO2 BP MAP (mmHg)    06/03/25 1400 --  --  74  14  99 %  120/63  81     06/03/25 1500 --  --  76  21  97 %  127/65  80           Pain Assessment:  Pain Assessment  Pain Assessment: 0-10  0-10 (Numeric) Pain Score: 0 - No pain    Objective   Cognition:  Overall Cognitive Status: Within Functional Limits  Arousal/Alertness: Appropriate responses to stimuli  Orientation Level: Oriented X4  Activities of Daily Living: Grooming  Grooming Level of Assistance: Setup, Close supervision  Grooming Where Assessed: Standing sinkside  Grooming Comments: Pt. completed oral hygiene while standing at the sink. Pt. able to obtain all materials from counter top and engage in task.    Functional Standing Tolerance:  Functional Standing Tolerance  Time: 7 minutes  Activity: oral hygiene standing sink side.  Functional Standing Tolerance Comments: no LOB noted, good tolerance no fatigue  Bed Mobility/Transfers: Bed Mobility  Bed Mobility: No    Transfers  Transfer: Yes  Transfer 1  Transfer From 1: Chair with arms to  Transfer to 1: Sit, Stand  Technique 1: Sit to stand, Stand to sit  Transfer Device 1: Walker  Transfer Level of Assistance 1: Close supervision  Trials/Comments 1: increased time to complete.    Functional Mobility:  Functional Mobility  Functional Mobility Performed: Yes  Functional Mobility 1  Surface 1: Level tile  Device 1: Rolling walker  Assistance 1: Close supervision  Comments 1: cueing to manage objects  and walk with steady pace. Line management provided  by therapist. Pt. reports she felt weird  using walker. Cueing for proper technique.  Sitting Balance:  Static Sitting Balance  Static Sitting-Balance Support: Feet supported  Static Sitting-Level of Assistance: Independent  Dynamic Sitting Balance  Dynamic Sitting-Balance Support: Feet supported  Dynamic Sitting-Level of Assistance: Independent  Dynamic Sitting-Balance: Forward lean  Standing Balance:  Static Standing Balance  Static Standing-Balance Support: Bilateral upper extremity supported  Static Standing-Level of Assistance: Close supervision  Dynamic Standing Balance  Dynamic Standing-Balance Support: Bilateral upper extremity supported  Dynamic Standing-Level of Assistance: Close supervision  Dynamic Standing-Balance: Turning    Outcome Measures:  [facility] Daily Activity  Putting on and taking off regular lower body clothing: A little  Bathing (including washing, rinsing, drying): A lot  Putting on and taking off regular upper body clothing: A little  Toileting, which includes using toilet, bedpan or urinal: Total  Taking care of personal grooming such as brushing teeth: A little  Eating Meals: None  Daily Activity - Total Score: 16     and OT Adult Other Outcome Measures  Short Blessed Test (SBT): 4  Patient scored 4 on SBT (Short Blessed Test) indicatin-4 considered normal cognition.    Deficits noted in the following areas short term memory.     This interpretation should not be used a medical diagnosis and further medical assessment would be required.   Patient engaged in the short blessed test in which they were asked the year, month, to repeat an address after some time, state the time, count backwards from 20-1, and say the months in backwards order. Patient scored a 4 -  Normal Cognition.    Education Documentation  Body Mechanics, taught by Pam Medrano OT at 6/3/2025  3:55 PM.  Learner: Patient  Readiness: Acceptance  Method: Explanation  Response: Verbalizes Understanding  Comment: Edu on POC and walker management.    ADL Training,  taught by Pam Medrano OT at 6/3/2025  3:55 PM.  Learner: Patient  Readiness: Acceptance  Method: Explanation  Response: Verbalizes Understanding  Comment: Edu on POC and walker management.    Education Comments  No comments found.      Goals:  Encounter Problems       Encounter Problems (Active)       ADLs       Patient will perform UB and LB bathing with stand by assist level of assistance.       Start:  06/02/25    Expected End:  06/16/25            Patient with complete upper body dressing with modified independent level of assistance donning and doffing all UE clothes with PRN adaptive equipment while supported sitting       Start:  06/02/25    Expected End:  06/16/25            Patient with complete lower body dressing with modified independent level of assistance donning and doffing all LE clothes  with PRN adaptive equipment while supported sitting       Start:  06/02/25    Expected End:  06/16/25            Patient will complete daily grooming tasks brushing teeth and washing face/hair with modified independent level of assistance and PRN adaptive equipment while standing. (Progressing)       Start:  06/02/25    Expected End:  06/16/25            Patient will complete toileting including hygiene clothing management/hygiene with minimal assist  level of assistance.       Start:  06/02/25    Expected End:  06/16/25               MOBILITY       Patient will perform Functional mobility mod  Household distances/Community Distances with modified independent level of assistance and least restrictive device in order to improve safety and functional mobility. (Progressing)       Start:  06/02/25    Expected End:  06/16/25               TRANSFERS       Patient will complete functional transfer to chair, bed, commode with least restrictive device with modified independent level of assistance. (Progressing)       Start:  06/02/25    Expected End:  06/16/25

## 2025-06-03 NOTE — PROGRESS NOTES
"Hospital day: 3    24 hour events: no acute events     Subjective/ Objective: on po antibiotics, in NSR, started on stool softner     HPI/Hospital Course   74F with HTN, anemia, detached retina s/p repair presented with nausea, vomiting, decreased appetite x 7 days.  Patient reports 7 days ago, she ate a sandwich with sausage, spinach and since then, she has had nausea and vomiting, decreased appetite.  Last BM 1-2 days ago. Reports associated chills, low UOP, poor appetite.  She denies chest pain, shortness of breath, blood in her urine, black or bloody stools, lower extremity edema.  CT AP with non-specific fluid filled SB ?enteritis.      5/31 > worsening TAMMY > pending transfer for nephrology consult, Na improving slowly, trending Na q4h, powell in place > good UOP, A-Fib rate controlled > metoprolol restarted, hep gtt ongoing, surgery following > non-op management, blood cx +E.COLI, 1L bolus given  5/30 > ongoing hypertonic 3% infusion @ 15, trending Na q4h, hep gtt ongoing for afib, beta blocker held 2/2 marginal BP, CARDS consult & ECHO- EF 65%, Large PFO              Visit Vitals  BP (!) 124/97   Pulse 75   Temp 36.7 °C (98.1 °F) (Temporal)   Resp 17   Ht 1.626 m (5' 4\")   Wt 79.1 kg (174 lb 6.1 oz)   SpO2 100%   BMI 29.93 kg/m²   OB Status Postmenopausal   Smoking Status Never   BSA 1.89 m²       Physicial examination is limited by the televisit. This is based on the input from the nursing team at bedside.   HEENT: PERRLA, EOM intact  CVS-S1S2 heard, no rubs or gallops  RS: Clear bilaterally  Abd: no guarding or rigidity  CNS: no new deficits   Ext: no cyanosis/clubbing or edema    Current Medications[1]    Lab Results   Component Value Date    GLUCOSE 95 06/02/2025    CALCIUM 7.6 (L) 06/02/2025     (LL) 06/02/2025    K 3.9 06/02/2025    CO2 16 (L) 06/02/2025    CL 88 (L) 06/02/2025    BUN 57 (H) 06/02/2025    CREATININE 3.23 (H) 06/02/2025     Lab Results   Component Value Date    WBC 18.6 (H) " 06/02/2025    HGB 8.2 (L) 06/02/2025    HCT 22.0 (L) 06/02/2025    MCV 82 06/02/2025     06/02/2025       XR chest 1 view 06/01/2025    Narrative  Interpreted By:  Myrtle Duque,  STUDY:  XR CHEST 1 VIEW;  6/1/2025 1:37 am    INDICATION:  Signs/Symptoms:change in breathing      COMPARISON:  CT chest, abdomen, pelvis 05/30/2025.    ACCESSION NUMBER(S):  [id number]    ORDERING CLINICIAN:  ALEXA PROCTOR    TECHNIQUE:  Portable upright frontal view of the chest was obtained .    FINDINGS:  Monitoring leads are overlying the patient.    The heart is enlarged. There is mild interstitial edema.    There is a small left pleural effusion with airspace opacity at the  left lung base. No pneumothorax.    Impression  1.  Cardiomegaly. Mild interstitial edema. Small left pleural  effusion with airspace opacity at the left lung base, may be  secondary to atelectasis or pneumonia.     TTE: CONCLUSIONS:   1. Left ventricular ejection fraction is normal by visual estimate at 65-70%.   2. There is normal right ventricular global systolic function.   3. Mildly enlarged right ventricle.   4. The left atrium is mildly dilated.   5. Moderate mitral valve regurgitation.   6. Moderate to severe tricuspid regurgitation visualized.   7. The doppler estimated RVSP is within normal limits with a right ventricular systolic pressure of 30 mmHg.   8. Mild aortic valve regurgitation.   9. A bubble study using agitated saline was performed. Bubble study is positive. A large PFO (> 20 bubbles) was demonstrated.    Assessment and Plan:     Problem list:  Generalized weakness/ Fall   UTI- On antibiotics  Hyponatremia- hyperosmolar/hypovolemic  Atrial fibrillation on anticoagulation- new onset- now in NSR  LARGE - PFO ? Needs intervention- pending cardio input  TAMMY  Non specific gastroenteritis     Plan:  Continue with the antibiotics  Continue to monitor sodium- latest levels 117  Diuresis held  Monitor urine output  Telemonitoring    Continue Apixaban  Appreciate cardiology input     Time spent: 30 minutes spent to round on patients/ nursing team and the documentation    The entirety of this visit history, physical exam and diagnostic interpretation was done via telemedicine.  This telemedicine visit format was completed with a combination of audio and visual capability with onsite assistance and input from the bedside registered nurse.  The patient is located at [facility] and I am currently located in [place], [place].        [1]   Current Facility-Administered Medications:     acetaminophen (Tylenol) tablet 650 mg, 650 mg, oral, q4h PRN, 650 mg at 06/01/25 1712 **OR** [DISCONTINUED] acetaminophen (Tylenol) oral liquid 650 mg, 650 mg, nasogastric tube, q4h PRN **OR** [DISCONTINUED] acetaminophen (Tylenol) suppository 650 mg, 650 mg, rectal, q4h PRN, Carlos Alberto Noel MD    [Held by provider] amLODIPine (Norvasc) tablet 10 mg, 10 mg, oral, Daily, Jung Esparza DO    amoxicillin-clavulanate (Augmentin) 500-125 mg per tablet 1 tablet, 1 tablet, oral, BID, GOMEZ Johnston, 1 tablet at 06/02/25 2147    apixaban (Eliquis) tablet 2.5 mg, 2.5 mg, oral, BID, ARNULFO Johnston-CNP, 2.5 mg at 06/02/25 2147    cholecalciferol (Vitamin D-3) tablet 25 mcg, 25 mcg, oral, Daily, GOMEZ Fuller, 25 mcg at 06/02/25 0843    dextromethorphan-guaifenesin (Robitussin DM)  mg/5 mL oral liquid 5 mL, 5 mL, oral, q4h PRN, Carlos Alberto Neol MD    insulin lispro injection 0-10 Units, 0-10 Units, subcutaneous, TID AC, GOMEZ Fuller    ipratropium-albuteroL (Duo-Neb) 0.5-2.5 mg/3 mL nebulizer solution 3 mL, 3 mL, nebulization, 4x daily PRN, Carlos Alberto Noel MD, 3 mL at 06/01/25 0027    [Held by provider] losartan (Cozaar) tablet 100 mg, 100 mg, oral, Daily, Jung Esparza DO    magnesium hydroxide (Milk of Magnesia) 2,400 mg/10 mL suspension 10 mL, 10 mL, oral, Daily PRN, Sandy Danielle, APRN-CNP    metoprolol succinate  XL (Toprol-XL) 24 hr tablet 100 mg, 100 mg, oral, Daily, Jung Esparza DO, 100 mg at 06/02/25 0843    oxyCODONE (Roxicodone) immediate release tablet 5 mg, 5 mg, oral, q4h PRN, Sandy Danielle, APRN-CNP    oxygen (O2) therapy, , inhalation, Continuous PRN - O2/gases, Carlos Alberto Noel MD, 2 L/min at 06/01/25 0027    sennosides-docusate sodium (Ana Maria-Colace) 8.6-50 mg per tablet 2 tablet, 2 tablet, oral, BID, Sandy Foremanasia, APRN-CNP, 2 tablet at 06/02/25 2148    sodium bicarbonate tablet 650 mg, 650 mg, oral, TID, Sandy Danielle, APRN-CNP, 650 mg at 06/02/25 2147    sodium chloride 0.9% infusion, 10 mL/hr, intravenous, Continuous PRN, Isabella Walker MD

## 2025-06-03 NOTE — CARE PLAN
"The patient's goals for the shift include \"get some sleep\"    The clinical goals for the shift include Patient sodium will increase from 120 and patient will remain free of injury this shift 6/3/25 1900.    Patient Na did increase from 120 to 121 and remains injury free this shift. Patient sat up in chair during the day. Family at bedside. Patient walked with PT in the unit with walk and tolerated well. Patient remains in SR this shift.   "

## 2025-06-03 NOTE — PROGRESS NOTES
06/03/25 1229   Discharge Planning   Living Arrangements Alone   Expected Discharge Disposition SNF  (Updates sent to [facility]- patient not medically stable for discharge today.  No insurance barriers when ready.)   Does the patient need discharge transport arranged? Yes   RoundTrip coordination needed? Yes   Has discharge transport been arranged? No   Intensity of Service   Intensity of Service 0-30 min

## 2025-06-03 NOTE — CARE PLAN
"The patient's goals for the shift include \"get some sleep\"    The clinical goals for the shift include Pt sodium will continue to safely and slowly increase with each lab collection. Pt will tolerate bipap for entire night while sleeping.      "

## 2025-06-03 NOTE — PROGRESS NOTES
Cardiology Progress Note  Patient: Mireya Nava  Unit/Bed: 01/01-A  YOB: 1950    Admitting Diagnosis: Urinary retention [R33.9]  Hyponatremia [E87.1]  Generalized abdominal pain [R10.84]  New onset atrial fibrillation (Multi) [I48.91]  Generalized weakness [R53.1]  Atrial fibrillation, persistent (Multi) [I48.19]  Nausea and vomiting, unspecified vomiting type [R11.2]  Date:  5/30/2025  Attending: Isabella Walker MD      Hospital Day: 4    SUBJECTIVE:   Sitting up in the chair visiting with family. Denies CP, SOB or palpitations. Endorses some feeling of swelling.     OBJECTIVE  Vitals:   Visit Vitals  /57 (BP Location: Left arm, Patient Position: Lying)   Pulse 79   Temp 36.6 °C (97.9 °F) (Temporal)   Resp 16        Wt Readings from Last 5 Encounters:   06/03/25 74.9 kg (165 lb 2 oz)   05/27/25 68.5 kg (151 lb)   06/21/24 65.8 kg (145 lb)   06/10/24 66.7 kg (147 lb)   09/29/23 66.2 kg (146 lb)       INTAKE/ OUTPUT:   I/O last 3 completed shifts:  In: 2152.5 (28.7 mL/kg) [P.O.:920; I.V.:1132.5 (15.1 mL/kg); IV Piggyback:100]  Out: 4080 (54.5 mL/kg) [Urine:4080 (1.5 mL/kg/hr)]  Weight: 74.9 kg      TELEMETRY MONITORING : SR 70s    PHYSICAL EXAMINATION:    Vitals reviewed.   Constitutional:       General: Awake.      Appearance: Normal appearance. Well-developed and not in distress.   Eyes:      General: Lids are normal.      Pupils: Pupils are equal, round, and reactive to light.   HENT:      Right Ear: External ear normal.      Left Ear: External ear normal.      Nose: Nose normal.    Mouth/Throat:      Lips: Pink.      Mouth: Mucous membranes are moist.   Pulmonary:      Effort: Pulmonary effort is normal.      Breath sounds: Normal air entry.      Comments: Fine crackles in the bases  Cardiovascular:      PMI at left midclavicular line. Normal rate. Regular rhythm. Normal S1. Normal S2.       Murmurs: There is no murmur.      Comments: + 1 BLE edema  Edema:     Peripheral edema absent.    Abdominal:      General: Bowel sounds are normal.      Palpations: Abdomen is soft.      Tenderness: There is no abdominal tenderness.   Musculoskeletal:      Cervical back: Full passive range of motion without pain, normal range of motion and neck supple. Skin:     General: Skin is warm and dry.   Neurological:      General: No focal deficit present.      Mental Status: Alert, oriented to person, place, and time and oriented to person, place and time. Mental status is at baseline.   Psychiatric:         Attention and Perception: Attention normal.         Mood and Affect: Mood normal.         Speech: Speech normal.         Behavior: Behavior is cooperative.        LABS:   CMP:   Recent Labs     06/03/25  0457 06/02/25 2001 06/02/25  1412 06/02/25  0534 06/02/25  0017 06/01/25  1803 06/01/25  1225 06/01/25  0827 06/01/25  0417 06/01/25  0018 05/31/25  1219 05/31/25  0721   * 117* 116* 113* 114* 112* 113* 114* 112* 113*   < > 112*  112*   K 3.8 3.9 4.0 3.7 3.8 4.0 3.8 3.7 3.7 3.8   < > 3.6  3.6   CL 92* 88* 87* 87* 88* 85* 86* 86* 86* 86*   < > 85*  85*   CO2 16* 16* 18* 15* 17* 16* 16* 18* 17* 18*   < > 18*  18*   ANIONGAP 16 17 15 15 13 15 15 14 13 13   < > 13  13   BUN 60* 57* 56* 54* 54* 55* 53* 52* 54* 51*   < > 51*  51*   CREATININE 3.20* 3.23* 3.24* 3.21* 3.25* 3.18* 3.00* 3.07* 3.08* 2.98*   < > 2.81*  2.81*   EGFR 14* 14* 14* 14* 14* 15* 16* 15* 15* 16*   < > 17*  17*   MG  --   --   --   --   --   --   --   --   --   --   --  1.75    < > = values in this interval not displayed.     LIVER ENZYMES:   Recent Labs     06/02/25  0534 06/01/25  0417 05/31/25  0721 05/30/25  1119 05/30/24  0759 05/23/23  0829 05/25/22  0821 01/04/21  0728 06/11/19  0821   ALBUMIN 2.2* 2.2* 2.2* 3.3* 4.7 4.3 4.5 4.5 4.7   ALT 15 16  --  33 13 13 15 13 15   AST 11 9  --  28 18 21 21 23 26   BILITOT 0.7 0.6  --  1.0 0.4 0.3 0.3 0.2 0.4     CBC:  Recent Labs     06/03/25  0457 06/02/25  0534 06/01/25  0417  "05/31/25  0721 05/30/25  2325 05/30/25  1119 05/30/24  0759 05/23/23  0829   WBC 15.1* 18.6* 19.9* 19.3* 18.8* 19.8* 7.0 6.3   HGB 7.8* 8.2* 8.2* 8.2* 9.2* 10.8* 11.2* 11.3*   HCT 20.9* 22.0* 21.5* 21.9* 24.0* 28.8* 33.2* 33.9*    185 158 151 155 186 304 294   MCV 82 82 81 82 81 82 92 91.6     COAG: No results for input(s): \"PTT\", \"INR\", \"ARIXTRA\" in the last 34568 hours.  ABO: No results for input(s): \"ABO\" in the last 08166 hours.  HEME/ENDO:  Recent Labs     06/03/25  0457 05/31/25  0721 05/30/25 2325 05/30/24  0759 05/25/22  0821 01/04/21  0728   IRONSAT 14*  --   --   --   --   --    TSH  --  0.58  --   --   --  1.18   HGBA1C  --   --  5.7* 5.6   < > 5.6    < > = values in this interval not displayed.      CARDIAC:   Recent Labs     06/02/25  1412 06/01/25  0417   BNP 1,041* 1,057*       Lipid Panel:  No results found for: \"HDL\", \"CHHDL\", \"VLDL\", \"TRIG\", \"NHDL\"     BNP:  BNP   Date Value Ref Range Status   06/02/2025 1,041 (H) 0 - 99 pg/mL Final     TSH  Lab Results   Component Value Date    TSH 0.58 05/31/2025     Hemoglobin A1C   Date Value Ref Range Status   05/30/2025 5.7 (H) See comment % Final      CURRENT MEDICATIONS:   Infusions:  sodium chloride 0.9%      Scheduled:  [Held by provider] amLODIPine, 10 mg, Daily  amoxicillin-clavulanate, 1 tablet, BID  apixaban, 5 mg, BID  calcium carbonate, 500 mg of calcium carbonate, TID  cholecalciferol, 25 mcg, Daily  insulin lispro, 0-10 Units, TID AC  iron sucrose, 300 mg, Once  [Held by provider] losartan, 100 mg, Daily  metoprolol succinate XL, 100 mg, Daily  nystatin, 5 mL, 4x daily  sennosides-docusate sodium, 2 tablet, BID  sodium bicarbonate, 650 mg, TID      PRN:  acetaminophen, 650 mg, q4h PRN  dextromethorphan-guaifenesin, 5 mL, q4h PRN  ipratropium-albuteroL, 3 mL, 4x daily PRN  magnesium hydroxide, 10 mL, Daily PRN  oxyCODONE, 5 mg, q4h PRN  oxygen, , Continuous PRN - O2/gases  sodium chloride 0.9%, 10 mL/hr, Continuous PRN      IMAGING REPORTS " INDEPENDENTLY REVIEWED:   CXR 6/1/2025  IMPRESSION:  1.  Cardiomegaly. Mild interstitial edema. Small left pleural  effusion with airspace opacity at the left lung base, may be  secondary to atelectasis or pneumonia.    CT chest abdomen pelvis 5/30/2025  IMPRESSION:      Limited examination without intravenous contrast.      The appendix is not visualized, although right lower quadrant  inflammation and free fluid centered around the cecum raise concern  for possible acute appendicitis. No organized fluid collection.  Please correlate with right lower quadrant tenderness.      Multiple fluid-filled small bowel within the pelvis could represent  nonspecific enteritis. No bowel obstruction.    Small left pleural effusion without definite evidence of pneumonia.    4 cm ascending thoracic aortic aneurysm.    CARDIOVASCULAR STUDIES:     Echocardiogram 6/2/2025---pending    ASSESSMENT:    73 yo F admitted with AF with a controlled ventricular response  Severe hyponatremia, being corrected  TAMMY  Moderate to Severe TR  + PFO     Plan  Continue management of hyponatremia.    Closely monitor for volume overload, increased swelling to BLE  Continue Oral anticoagulation   Continue metoprolol for rate control  Echocardiogram with preserved LVEF, moderate to severe TR, mild PH, +PFO  Recommend r/o obstruction as cause of TR in the setting of PH  NO further intervention for PFO at this time  Recommend TR surveillance in 6 months with Echo  Elective outpatient cardioversion after 1 month of oral anticoagulation, if she does not revert spontaneously back to sinus rhythm.  Spontaneously converted 6/2/2025       Thank you  for the consult   Will follow peripherally   Please call or message with questions, concerns or changes in clinical condition   The case was discussed with GOMEZ Schultz  and Dr. Arevalo       Electronically signed by GOMEZ Bill on 6/3/2025 at 11:14 AM

## 2025-06-04 ENCOUNTER — APPOINTMENT (OUTPATIENT)
Dept: RADIOLOGY | Facility: HOSPITAL | Age: 75
DRG: 871 | End: 2025-06-04
Payer: MEDICARE

## 2025-06-04 ENCOUNTER — APPOINTMENT (OUTPATIENT)
Dept: CARDIOLOGY | Facility: HOSPITAL | Age: 75
DRG: 871 | End: 2025-06-04
Payer: MEDICARE

## 2025-06-04 LAB
ANION GAP SERPL CALC-SCNC: 13 MMOL/L (ref 10–20)
BACTERIA BLD AEROBE CULT: ABNORMAL
BACTERIA BLD CULT: ABNORMAL
BUN SERPL-MCNC: 49 MG/DL (ref 6–23)
CALCIUM SERPL-MCNC: 8.2 MG/DL (ref 8.6–10.3)
CHLORIDE SERPL-SCNC: 93 MMOL/L (ref 98–107)
CO2 SERPL-SCNC: 22 MMOL/L (ref 21–32)
CREAT SERPL-MCNC: 2.33 MG/DL (ref 0.5–1.05)
EGFRCR SERPLBLD CKD-EPI 2021: 21 ML/MIN/1.73M*2
ERYTHROCYTE [DISTWIDTH] IN BLOOD BY AUTOMATED COUNT: 14.6 % (ref 11.5–14.5)
GLUCOSE BLD MANUAL STRIP-MCNC: 108 MG/DL (ref 74–99)
GLUCOSE BLD MANUAL STRIP-MCNC: 116 MG/DL (ref 74–99)
GLUCOSE BLD MANUAL STRIP-MCNC: 119 MG/DL (ref 74–99)
GLUCOSE BLD MANUAL STRIP-MCNC: 131 MG/DL (ref 74–99)
GLUCOSE SERPL-MCNC: 122 MG/DL (ref 74–99)
GRAM STN SPEC: ABNORMAL
HCT VFR BLD AUTO: 22.2 % (ref 36–46)
HGB BLD-MCNC: 8 G/DL (ref 12–16)
HOLD SPECIMEN: NORMAL
MCH RBC QN AUTO: 30 PG (ref 26–34)
MCHC RBC AUTO-ENTMCNC: 36 G/DL (ref 32–36)
MCV RBC AUTO: 83 FL (ref 80–100)
NRBC BLD-RTO: 0 /100 WBCS (ref 0–0)
PLATELET # BLD AUTO: 261 X10*3/UL (ref 150–450)
POTASSIUM SERPL-SCNC: 3.8 MMOL/L (ref 3.5–5.3)
Q ONSET: 219 MS
QRS COUNT: 14 BEATS
QRS DURATION: 86 MS
QT INTERVAL: 362 MS
QTC CALCULATION(BAZETT): 430 MS
QTC FREDERICIA: 406 MS
R AXIS: 17 DEGREES
RBC # BLD AUTO: 2.67 X10*6/UL (ref 4–5.2)
SODIUM SERPL-SCNC: 124 MMOL/L (ref 136–145)
T AXIS: 24 DEGREES
T OFFSET: 400 MS
VENTRICULAR RATE: 85 BPM
WBC # BLD AUTO: 15.6 X10*3/UL (ref 4.4–11.3)

## 2025-06-04 PROCEDURE — 2500000004 HC RX 250 GENERAL PHARMACY W/ HCPCS (ALT 636 FOR OP/ED): Mod: IPSPLIT | Performed by: HOSPITALIST

## 2025-06-04 PROCEDURE — 93010 ELECTROCARDIOGRAM REPORT: CPT | Performed by: INTERNAL MEDICINE

## 2025-06-04 PROCEDURE — 36415 COLL VENOUS BLD VENIPUNCTURE: CPT | Mod: IPSPLIT | Performed by: NURSE PRACTITIONER

## 2025-06-04 PROCEDURE — 2500000001 HC RX 250 WO HCPCS SELF ADMINISTERED DRUGS (ALT 637 FOR MEDICARE OP): Mod: IPSPLIT | Performed by: NURSE PRACTITIONER

## 2025-06-04 PROCEDURE — 87040 BLOOD CULTURE FOR BACTERIA: CPT | Mod: GENLAB | Performed by: NURSE PRACTITIONER

## 2025-06-04 PROCEDURE — 99232 SBSQ HOSP IP/OBS MODERATE 35: CPT | Performed by: NURSE PRACTITIONER

## 2025-06-04 PROCEDURE — 2500000001 HC RX 250 WO HCPCS SELF ADMINISTERED DRUGS (ALT 637 FOR MEDICARE OP): Mod: IPSPLIT | Performed by: HOSPITALIST

## 2025-06-04 PROCEDURE — 2500000004 HC RX 250 GENERAL PHARMACY W/ HCPCS (ALT 636 FOR OP/ED): Mod: IPSPLIT | Performed by: NURSE PRACTITIONER

## 2025-06-04 PROCEDURE — 82947 ASSAY GLUCOSE BLOOD QUANT: CPT | Mod: IPSPLIT

## 2025-06-04 PROCEDURE — 71045 X-RAY EXAM CHEST 1 VIEW: CPT | Mod: IPSPLIT

## 2025-06-04 PROCEDURE — 80048 BASIC METABOLIC PNL TOTAL CA: CPT | Mod: IPSPLIT | Performed by: NURSE PRACTITIONER

## 2025-06-04 PROCEDURE — 1200000002 HC GENERAL ROOM WITH TELEMETRY DAILY: Mod: IPSPLIT

## 2025-06-04 PROCEDURE — 97110 THERAPEUTIC EXERCISES: CPT | Mod: GP,CQ,IPSPLIT

## 2025-06-04 PROCEDURE — 78803 RP LOCLZJ TUM SPECT 1 AREA: CPT | Mod: IPSPLIT

## 2025-06-04 PROCEDURE — 3430000001 HC RX 343 DIAGNOSTIC RADIOPHARMACEUTICALS: Mod: IPSPLIT | Performed by: INTERNAL MEDICINE

## 2025-06-04 PROCEDURE — 78803 RP LOCLZJ TUM SPECT 1 AREA: CPT | Performed by: INTERNAL MEDICINE

## 2025-06-04 PROCEDURE — 94760 N-INVAS EAR/PLS OXIMETRY 1: CPT | Mod: IPSPLIT

## 2025-06-04 PROCEDURE — 97116 GAIT TRAINING THERAPY: CPT | Mod: GP,CQ,IPSPLIT

## 2025-06-04 PROCEDURE — 2500000001 HC RX 250 WO HCPCS SELF ADMINISTERED DRUGS (ALT 637 FOR MEDICARE OP): Mod: IPSPLIT | Performed by: INTERNAL MEDICINE

## 2025-06-04 PROCEDURE — 93005 ELECTROCARDIOGRAM TRACING: CPT | Mod: IPSPLIT

## 2025-06-04 PROCEDURE — 85027 COMPLETE CBC AUTOMATED: CPT | Mod: IPSPLIT | Performed by: NURSE PRACTITIONER

## 2025-06-04 PROCEDURE — 71045 X-RAY EXAM CHEST 1 VIEW: CPT | Performed by: STUDENT IN AN ORGANIZED HEALTH CARE EDUCATION/TRAINING PROGRAM

## 2025-06-04 PROCEDURE — A9540 TC99M MAA: HCPCS | Mod: IPSPLIT | Performed by: INTERNAL MEDICINE

## 2025-06-04 RX ORDER — ASCORBIC ACID 500 MG
500 TABLET ORAL DAILY
Status: DISCONTINUED | OUTPATIENT
Start: 2025-06-04 | End: 2025-06-09 | Stop reason: HOSPADM

## 2025-06-04 RX ORDER — FERROUS GLUCONATE 325 MG
38 TABLET ORAL
Status: DISCONTINUED | OUTPATIENT
Start: 2025-06-05 | End: 2025-06-09 | Stop reason: HOSPADM

## 2025-06-04 RX ADMIN — SODIUM BICARBONATE 650 MG: 650 TABLET ORAL at 21:26

## 2025-06-04 RX ADMIN — CALCIUM CARBONATE (ANTACID) CHEW TAB 500 MG 1 TABLET: 500 CHEW TAB at 08:43

## 2025-06-04 RX ADMIN — NYSTATIN 500000 UNITS: 100000 SUSPENSION ORAL at 06:50

## 2025-06-04 RX ADMIN — AMOXICILLIN AND CLAVULANATE POTASSIUM 1 TABLET: 500; 125 TABLET, FILM COATED ORAL at 21:26

## 2025-06-04 RX ADMIN — APIXABAN 5 MG: 5 TABLET, FILM COATED ORAL at 08:43

## 2025-06-04 RX ADMIN — CALCIUM CARBONATE (ANTACID) CHEW TAB 500 MG 1 TABLET: 500 CHEW TAB at 14:44

## 2025-06-04 RX ADMIN — APIXABAN 5 MG: 5 TABLET, FILM COATED ORAL at 21:26

## 2025-06-04 RX ADMIN — AMLODIPINE BESYLATE 10 MG: 10 TABLET ORAL at 11:46

## 2025-06-04 RX ADMIN — KIT FOR THE PREPARATION OF TECHNETIUM TC 99M ALBUMIN AGGREGATED 4.3 MILLICURIE: 2.5 INJECTION, POWDER, FOR SOLUTION INTRAVENOUS at 07:50

## 2025-06-04 RX ADMIN — SENNOSIDES AND DOCUSATE SODIUM 2 TABLET: 50; 8.6 TABLET ORAL at 08:43

## 2025-06-04 RX ADMIN — SODIUM BICARBONATE 650 MG: 650 TABLET ORAL at 08:43

## 2025-06-04 RX ADMIN — METOPROLOL SUCCINATE 100 MG: 100 TABLET, EXTENDED RELEASE ORAL at 08:43

## 2025-06-04 RX ADMIN — AMOXICILLIN AND CLAVULANATE POTASSIUM 1 TABLET: 500; 125 TABLET, FILM COATED ORAL at 08:43

## 2025-06-04 RX ADMIN — NYSTATIN 500000 UNITS: 100000 SUSPENSION ORAL at 17:50

## 2025-06-04 RX ADMIN — OXYCODONE HYDROCHLORIDE AND ACETAMINOPHEN 500 MG: 500 TABLET ORAL at 11:46

## 2025-06-04 RX ADMIN — Medication 25 MCG: at 08:43

## 2025-06-04 RX ADMIN — SODIUM BICARBONATE 650 MG: 650 TABLET ORAL at 14:44

## 2025-06-04 RX ADMIN — CALCIUM CARBONATE (ANTACID) CHEW TAB 500 MG 1 TABLET: 500 CHEW TAB at 21:26

## 2025-06-04 RX ADMIN — NYSTATIN 500000 UNITS: 100000 SUSPENSION ORAL at 21:25

## 2025-06-04 RX ADMIN — NYSTATIN 500000 UNITS: 100000 SUSPENSION ORAL at 13:45

## 2025-06-04 ASSESSMENT — COGNITIVE AND FUNCTIONAL STATUS - GENERAL
MOVING TO AND FROM BED TO CHAIR: A LITTLE
TOILETING: A LITTLE
STANDING UP FROM CHAIR USING ARMS: A LITTLE
CLIMB 3 TO 5 STEPS WITH RAILING: A LITTLE
HELP NEEDED FOR BATHING: A LITTLE
MOVING FROM LYING ON BACK TO SITTING ON SIDE OF FLAT BED WITH BEDRAILS: A LITTLE
DAILY ACTIVITIY SCORE: 22
MOVING TO AND FROM BED TO CHAIR: A LITTLE
STANDING UP FROM CHAIR USING ARMS: A LITTLE
MOBILITY SCORE: 19
TURNING FROM BACK TO SIDE WHILE IN FLAT BAD: A LITTLE
MOBILITY SCORE: 21
WALKING IN HOSPITAL ROOM: A LITTLE

## 2025-06-04 ASSESSMENT — PAIN SCALES - GENERAL
PAINLEVEL_OUTOF10: 0 - NO PAIN

## 2025-06-04 ASSESSMENT — PAIN - FUNCTIONAL ASSESSMENT
PAIN_FUNCTIONAL_ASSESSMENT: 0-10

## 2025-06-04 NOTE — CARE PLAN
"The patient's goals for the shift include \"get some sleep\"    The clinical goals for the shift include Patient will not show signs or symptoms of Hyponatremia during this shift    the patient slept well with no adverse events.   "

## 2025-06-04 NOTE — PROGRESS NOTES
06/04/25 1118   Discharge Planning   Living Arrangements Alone   Support Systems Children   Assistance Needed Patient lives in the senior mobile [facility] alone. She has someone cut her grass. Her son or daughter help with things as needed. She says she is indepednent with ADLs, IADLs, ambulation and drives locally. No DME-no home oxygen. Confirmed address, pharmacy, and PCP is Makenzie Esparza.   Type of Residence Private residence   Home or Post Acute Services Post acute facilities (Rehab/SNF/etc)   Type of Post Acute Facility Services Skilled nursing   Expected Discharge Disposition SNF  (Accepted at [facility] when medically cleared;  Reviewed Advance Directives with patient and gave her a copy to complete.)   Does the patient need discharge transport arranged? Yes   RoundTrip coordination needed? Yes   Has discharge transport been arranged? No   What day is the transport expected? 06/05/25   Patient Choice   Provider Choice list and CMS website (https://medicare.gov/care-compare#search) for post-acute Quality and Resource Measure Data were provided and reviewed with: Patient   Stroke Family Assessment   Stroke Family Assessment Needed No   Intensity of Service   Intensity of Service 0-30 min

## 2025-06-04 NOTE — PROGRESS NOTES
Mireya Nava is a 74 y.o. female on day 5 of admission presenting with Generalized weakness.      Subjective   Patient assessed at bedside; sitting up in a chair. She is feeling much better. We discussed the POC; she was told about her bacteremia. She thought she ate something that made her sick. She denies pain, fever, chills, N/V/D/C.       Objective     Last Recorded Vitals  /58 (BP Location: Right arm, Patient Position: Lying)   Pulse 86   Temp 35.7 °C (96.3 °F) (Temporal)   Resp 18   Wt 78.5 kg (173 lb 1 oz)   SpO2 100%   Intake/Output last 3 Shifts:    Intake/Output Summary (Last 24 hours) at 6/4/2025 1037  Last data filed at 6/4/2025 0900  Gross per 24 hour   Intake 1676 ml   Output 3325 ml   Net -1649 ml       Admission Weight  Weight: 65.8 kg (145 lb) (05/30/25 1036)    Daily Weight  06/04/25 : 78.5 kg (173 lb 1 oz)    Image Results      Physical Exam  Vitals reviewed.   Constitutional:       Appearance: Normal appearance. She is normal weight.   HENT:      Head: Normocephalic and atraumatic.      Right Ear: External ear normal.      Left Ear: External ear normal.      Nose: Nose normal.      Mouth/Throat:      Mouth: Mucous membranes are moist.      Pharynx: Oropharynx is clear.   Eyes:      Conjunctiva/sclera: Conjunctivae normal.      Pupils: Pupils are equal, round, and reactive to light.   Cardiovascular:      Rate and Rhythm: Normal rate. Rhythm irregular.      Pulses: Normal pulses.      Heart sounds: Murmur heard.   Pulmonary:      Effort: Pulmonary effort is normal.      Breath sounds: Normal breath sounds.   Abdominal:      General: Bowel sounds are normal.      Palpations: Abdomen is soft.   Musculoskeletal:         General: Normal range of motion.      Cervical back: Normal range of motion and neck supple.   Skin:     General: Skin is warm and dry.   Neurological:      General: No focal deficit present.      Mental Status: She is alert and oriented to person, place, and time.    Psychiatric:         Mood and Affect: Mood normal.         Behavior: Behavior normal.         Relevant Results    Scheduled medications  Scheduled Medications[1]  Continuous medications  Continuous Medications[2]  PRN medications  PRN Medications[3]    Results for orders placed or performed during the hospital encounter of 05/30/25 (from the past 24 hours)   POCT GLUCOSE   Result Value Ref Range    POCT Glucose 107 (H) 74 - 99 mg/dL   Basic metabolic panel   Result Value Ref Range    Glucose 106 (H) 74 - 99 mg/dL    Sodium 121 (L) 136 - 145 mmol/L    Potassium 4.1 3.5 - 5.3 mmol/L    Chloride 92 (L) 98 - 107 mmol/L    Bicarbonate 17 (L) 21 - 32 mmol/L    Anion Gap 16 10 - 20 mmol/L    Urea Nitrogen 58 (H) 6 - 23 mg/dL    Creatinine 3.08 (H) 0.50 - 1.05 mg/dL    eGFR 15 (L) >60 mL/min/1.73m*2    Calcium 7.9 (L) 8.6 - 10.3 mg/dL   D-dimer, VTE Exclusion   Result Value Ref Range    D-Dimer, Quantitative VTE Exclusion 5,335 (H) <=500 ng/mL FEU   POCT GLUCOSE   Result Value Ref Range    POCT Glucose 108 (H) 74 - 99 mg/dL   POCT GLUCOSE   Result Value Ref Range    POCT Glucose 115 (H) 74 - 99 mg/dL   CBC   Result Value Ref Range    WBC 15.6 (H) 4.4 - 11.3 x10*3/uL    nRBC 0.0 0.0 - 0.0 /100 WBCs    RBC 2.67 (L) 4.00 - 5.20 x10*6/uL    Hemoglobin 8.0 (L) 12.0 - 16.0 g/dL    Hematocrit 22.2 (L) 36.0 - 46.0 %    MCV 83 80 - 100 fL    MCH 30.0 26.0 - 34.0 pg    MCHC 36.0 32.0 - 36.0 g/dL    RDW 14.6 (H) 11.5 - 14.5 %    Platelets 261 150 - 450 x10*3/uL   PST Top   Result Value Ref Range    Extra Tube Hold for add-ons.    POCT GLUCOSE   Result Value Ref Range    POCT Glucose 108 (H) 74 - 99 mg/dL                   This patient has a urinary catheter   Reason for the urinary catheter remaining today? Urine catheter unnecessary, will be removed today    Assessment & Plan  Primary hypertension    Hyperlipidemia    Hypo-osmolar hyponatremia    TAMMY (acute kidney injury)    Atrial fibrillation (Multi)    UTI (urinary tract  infection)    Acute urinary retention    Severe hypo-osmolar hyponatremia, improving  - Patient is alert and oriented to self, birthday, president and year and appears mentating well.  She does have some generalized weakness but no obvious gross focal neurologic deficits.  - 5/30 serum osmol 251  - TSH 0.58  - Na 109>111>112>114>113>120 > 121  - cortisol 28.5  - urine Na 22 - consistent with hypovolemic hyponatremia, likely secondary to dehydration  - patient is not on any obvious home medications to cause hyponatremia.  - Sodium was 130 on 5/30/2024.  - 3% hypertonic saline at 15 mL/h for 4 hours x 2  - ICU monitoring  - Goal to achieve a 24-hour increase in serum sodium of 4 to 6 mEq/L.   - seizure precautions.  - Q6 BMP  - appetite at baseline  - continue sodium bicarb 650 mg TID     Acute kidney injury, improving  - Likely secondary to dehydration with decreased oral intake.  - creat 2.68 > 2.88 > 3.25 > 3.21 > 3.20> Today Creatine 3.08  - urine creat 71.1 > 34.8  - urine sodium 23, Fena calculated showing likely pre-renal cause  - Patient given 1 L of IV fluids in the ER.  As above, will place on hypertonic saline at 15 mL/h for 4 hours and check BMPs every 4 hours (complete)  - 5/31 received D5 .45 overnight - bolus 100ml 3% NS this am and then 0.9NaCl @125ml/hr and monitor q4hr BMP   - CT scan of the abdomen pelvis with no signs of hydronephrosis, powell placed for retention  - Hold home medication of losartan with acute kidney injury.  - daily BMP     Nonspecific enteritis  - Dr. Garner consult, appreciate recs, feels no surgical need at this time   - tolerating diet  - WBC 19.8>18.8>19.3>19.9>15.1  - blood culture: grew E. Coli  - continue Augmentin 875 mg BID  - ID consult     New onset atrial fibrillation  Essential HTN  AAA  - continue metoprolol succinate 100 mg daily, amlodipine 10 mg daily  - hold losartan  - discontinued heparin gtt   - continue apixaban 5 mg BID  - echo: normal LVSF with an EF of  65-70%. Moderate mitral regurgitation. Moderate to severe tricuspid regurgitation. Mild aortic  regurgitation.  9. A bubble study using agitated saline was performed. Bubble study is positive. A large PFO (> 20 bubbles) was demonstrated  - Consult cardiology, recommending outpatient cardioversion after being apixaban for 1 month  - cardiac monitoring via telemetry  - 4 cm ascending thoracic aortic aneurysm found incidentally on CT scan  - Recommend follow-up with primary care provider and cardiothoracic surgery as an outpatient.  - patient is aware of findings   - monitor BP and HR     Acute cystitis with hematuria  Bacteremia  - UA 2+ blood; 3+ leukocytes, > 50 WBC; 4+bact  - urine culture grew E. Coli  - Blood cultures 4/4 positive for E. Coli  - Given ceftriaxone and zosyn  - continue augmentin 875 mg BID x 14 days  - consult ID, appreciate recs      Urinary retention  - Campbell catheter   - strict IO  - void trial today; removed Campbell  - Good urine output 10,105 mL in the past 24hr    Iron Deficiency  Normocytic Anemia  - Hb 7.8/MCV 82  - Iron 25; ; % sat 14  - given IV venofer 300 mg once  - started ferrous gluconate 324 mg daily with ascorbic acid 500 mg BID    Vitamin D deficiency  - continue cholecalciferol 25 mcg daily    Oral Candidiasis  - continue nystatin 500,000 units QID            DVT ppx:   - discontinued heparin gtt  - continue apixaban 5 mg BID     Code Status: full    Disposition: Pt requires inpatient stay at this time.           ARNULFO Phelps-CNP           [1] [Held by provider] amLODIPine, 10 mg, oral, Daily  amoxicillin-clavulanate, 1 tablet, oral, BID  apixaban, 5 mg, oral, BID  calcium carbonate, 500 mg of calcium carbonate, oral, TID  cholecalciferol, 25 mcg, oral, Daily  insulin lispro, 0-10 Units, subcutaneous, TID AC  [Held by provider] losartan, 100 mg, oral, Daily  metoprolol succinate XL, 100 mg, oral, Daily  nystatin, 5 mL, Swish & Spit, 4x daily  sennosides-docusate  sodium, 2 tablet, oral, BID  sodium bicarbonate, 650 mg, oral, TID  [2] sodium chloride 0.9%, 10 mL/hr  [3] PRN medications: acetaminophen **OR** [DISCONTINUED] acetaminophen **OR** [DISCONTINUED] acetaminophen, dextromethorphan-guaifenesin, ipratropium-albuteroL, magnesium hydroxide, oxyCODONE, oxygen, sodium chloride 0.9%

## 2025-06-04 NOTE — PROGRESS NOTES
Physical Therapy    Physical Therapy Treatment    Patient Name: Mireya Nava  MRN: [id number]  Department: [facility] ICU  Room: 01/01-A  Today's Date: 6/4/2025  Time Calculation  Start Time: 1147  Stop Time: 1220  Time Calculation (min): 33 min    Assessment/Plan   PT Assessment  PT Assessment Results: Decreased strength, Impaired balance, Decreased mobility, Decreased endurance  Rehab Prognosis: Good  Barriers to Discharge Home: Caregiver assistance, Physical needs  Caregiver Assistance: Patient lives alone and/or does not have reliable caregiver assistance  Physical Needs: 24hr mobility assistance needed  Evaluation/Treatment Tolerance: Patient tolerated treatment well  Medical Staff Made Aware: Yes  Strengths: Premorbid level of function  Barriers to Participation:  (n/a)  End of Session Communication: Bedside nurse  Assessment Comment: Pt was able to amb 2 x 50' using FWW with CGA; pt cued for posture and AD management with good carryover. Pt was able to perform supine to sitting EOB with MI and STS with CGA / min VC to push up with UE. Pt tolerated ther ex fairly well. Pt would continue to benefit from skilled therapy to improve overall strength and gait mechanics with FWW and prevent further decline.  End of Session Patient Position: Alarm on, Up in chair     PT Plan  Treatment/Interventions: Bed mobility, Transfer training, Gait training, Therapeutic exercise  PT Plan: Ongoing PT  PT Frequency: 3 times per week  PT Discharge Recommendations: Moderate intensity level of continued care  Equipment Recommended upon Discharge: Wheeled walker  PT Recommended Transfer Status: Assist x1  PT - OK to Discharge: Yes    PT Visit Info:  PT Received On: 06/04/25     General Visit Information:   General  Reason for Referral: impaired mobility, generalized weakness  Referred By: Jung Esparza DO  Past Medical History Relevant to Rehab: HTN, anemia, detached retina s/p repair  Family/Caregiver Present: No  Patient Position  Received: Bed, 2 rail up, Alarm on  Preferred Learning Style: written  General Comment: Pt was pleasant and agreeable to therapy. Pt was cleared by RN prior to session.    Subjective   Precautions:  Precautions  Medical Precautions: Fall precautions  Precautions Comment: pretty goldstein IV     Date/Time Vitals Session Patient Position Pulse Resp SpO2 BP MAP (mmHg)    06/04/25 1147 Pre PT  Lying  82  --  100 %  135/58  --     06/04/25 1200 --  --  86  13  77 %  --  --     06/04/25 1204 --  --  88  22  96 %  129/84  90     06/04/25 1205 --  --  90  27  92 %  121/104  111               Objective   Pain:  Pain Assessment  Pain Assessment: 0-10  0-10 (Numeric) Pain Score: 0 - No pain  Cognition:  Cognition  Overall Cognitive Status: Within Functional Limits  Arousal/Alertness: Appropriate responses to stimuli  Orientation Level: Oriented X4  Coordination:  Movements are Fluid and Coordinated: Yes  Activity Tolerance:  Activity Tolerance  Endurance: Endurance does not limit participation in activity    Treatments:  Therapeutic Exercise  Therapeutic Exercise Performed: Yes  Therapeutic Exercise Activity 1: 20x PF/DF  Therapeutic Exercise Activity 2: 2x10 LAQ  Therapeutic Exercise Activity 3: 2x10 seated hip flex.  Therapeutic Exercise Activity 4: 2x10 hip abd long axis snow angels    Bed Mobility  Bed Mobility: Yes  Bed Mobility 1  Bed Mobility 1: Supine to sitting  Level of Assistance 1: Modified independent    Ambulation/Gait Training  Ambulation/Gait Training Performed: Yes  Ambulation/Gait Training 1  Surface 1: Level tile  Device 1: Rolling walker  Gait Support Devices: Gait belt  Assistance 1: Contact guard, Minimal verbal cues  Quality of Gait 1: Diminished heel strike, Inconsistent stride length, Decreased step length, Shuffling gait, Soft knee(s)  Comments/Distance (ft) 1: 2 x 50' using FWW with CGA; pt cued to maintain upright posture with good carryover.    Transfers  Transfer: Yes  Transfer 1  Transfer From 1:  Sit to  Transfer to 1: Stand  Technique 1: Sit to stand, Stand to sit  Transfer Device 1: Walker, Gait belt  Transfer Level of Assistance 1: Contact guard, Minimal verbal cues  Trials/Comments 1: multiple trials throughout session from varied surfaces (EOB, chair with arms)    Outcome Measures:  [facility] Basic Mobility  Turning from your back to your side while in a flat bed without using bedrails: A little  Moving from lying on your back to sitting on the side of a flat bed without using bedrails: A little  Moving to and from bed to chair (including a wheelchair): A little  Standing up from a chair using your arms (e.g. wheelchair or bedside chair): A little  To walk in hospital room: A little  Climbing 3-5 steps with railing: None  Basic Mobility - Total Score: 19    FSS-ICU  Ambulation: Walks >/ or equal to 150 feet with minimal assistance x1  Rolling: Modified independence, requires use of assistive device  Sitting: Modified independence, requires use of assistive device  Transfer Sit-to-Stand: Supervision or set-up only  Transfer Supine-to-Sit: Supervision or set-up only  Total Score: 26    Education Documentation  Mobility Training, taught by Rhonda Walker PTA at 6/4/2025 12:58 PM.  Learner: Patient  Readiness: Acceptance  Method: Explanation  Response: Verbalizes Understanding  Comment: transfer technique, AD management    Education Comments  No comments found.      Encounter Problems       Encounter Problems (Active)       Balance       STG - Maintains dynamic standing balance without upper extremity support with normal balance  (Progressing)       Start:  06/02/25    Expected End:  06/16/25               Mobility       LTG - Patient will ambulate community distance DEMAR with LRD  (Progressing)       Start:  06/02/25    Expected End:  06/16/25            LTG - Patient will navigate 3 steps with 2 rails/device IND (Progressing)       Start:  06/02/25    Expected End:  06/16/25               PT Transfers        STG - Patient will perform bed mobility IND (Progressing)       Start:  06/02/25    Expected End:  06/16/25            STG - Patient will transfer sit to and from stand DEMAR with LRD  (Progressing)       Start:  06/02/25    Expected End:  06/16/25            Pt. will complete 5 STS without UE support in <15 seconds  (Progressing)       Start:  06/02/25    Expected End:  06/16/25               Pain - Adult

## 2025-06-04 NOTE — NURSING NOTE
Pt came to med surg floor from ICU. Assessment has been done-only noted change from previous assessments is +2 edema BLLE. Vitals are stable. Pt does not have any pain at this time. Pt has call light within reach and no new needs at this time.

## 2025-06-04 NOTE — CARE PLAN
The patient's goals for the shift include getting OOB more and getting stronger.    The clinical goals for the shift include promote increased activity; remove powell catheter; provide education about bacteremia; repeat blood cultures; improved serum sodium level; promote PO intake; monitor for nausea, vomiting, diarrhea, and diaphoresis; monitor blood glucose; and complete VQ scan to rule-out a pulmonary embolus.

## 2025-06-05 LAB
ANION GAP SERPL CALC-SCNC: 14 MMOL/L (ref 10–20)
ANION GAP SERPL CALC-SCNC: 16 MMOL/L (ref 10–20)
BUN SERPL-MCNC: 42 MG/DL (ref 6–23)
BUN SERPL-MCNC: 45 MG/DL (ref 6–23)
CALCIUM SERPL-MCNC: 8 MG/DL (ref 8.6–10.3)
CALCIUM SERPL-MCNC: 8.2 MG/DL (ref 8.6–10.3)
CHLORIDE SERPL-SCNC: 94 MMOL/L (ref 98–107)
CHLORIDE SERPL-SCNC: 95 MMOL/L (ref 98–107)
CO2 SERPL-SCNC: 20 MMOL/L (ref 21–32)
CO2 SERPL-SCNC: 21 MMOL/L (ref 21–32)
CREAT SERPL-MCNC: 1.74 MG/DL (ref 0.5–1.05)
CREAT SERPL-MCNC: 1.92 MG/DL (ref 0.5–1.05)
EGFRCR SERPLBLD CKD-EPI 2021: 27 ML/MIN/1.73M*2
EGFRCR SERPLBLD CKD-EPI 2021: 30 ML/MIN/1.73M*2
ERYTHROCYTE [DISTWIDTH] IN BLOOD BY AUTOMATED COUNT: 14.5 % (ref 11.5–14.5)
GLUCOSE BLD MANUAL STRIP-MCNC: 103 MG/DL (ref 74–99)
GLUCOSE BLD MANUAL STRIP-MCNC: 118 MG/DL (ref 74–99)
GLUCOSE BLD MANUAL STRIP-MCNC: 121 MG/DL (ref 74–99)
GLUCOSE BLD MANUAL STRIP-MCNC: 133 MG/DL (ref 74–99)
GLUCOSE SERPL-MCNC: 109 MG/DL (ref 74–99)
GLUCOSE SERPL-MCNC: 148 MG/DL (ref 74–99)
HCT VFR BLD AUTO: 20.1 % (ref 36–46)
HGB BLD-MCNC: 7.3 G/DL (ref 12–16)
MCH RBC QN AUTO: 30.3 PG (ref 26–34)
MCHC RBC AUTO-ENTMCNC: 36.3 G/DL (ref 32–36)
MCV RBC AUTO: 83 FL (ref 80–100)
NRBC BLD-RTO: 0 /100 WBCS (ref 0–0)
PLATELET # BLD AUTO: 309 X10*3/UL (ref 150–450)
POTASSIUM SERPL-SCNC: 3.8 MMOL/L (ref 3.5–5.3)
POTASSIUM SERPL-SCNC: 3.8 MMOL/L (ref 3.5–5.3)
RBC # BLD AUTO: 2.41 X10*6/UL (ref 4–5.2)
SODIUM SERPL-SCNC: 125 MMOL/L (ref 136–145)
SODIUM SERPL-SCNC: 127 MMOL/L (ref 136–145)
WBC # BLD AUTO: 15.7 X10*3/UL (ref 4.4–11.3)

## 2025-06-05 PROCEDURE — 2500000001 HC RX 250 WO HCPCS SELF ADMINISTERED DRUGS (ALT 637 FOR MEDICARE OP): Mod: IPSPLIT | Performed by: HOSPITALIST

## 2025-06-05 PROCEDURE — 80048 BASIC METABOLIC PNL TOTAL CA: CPT | Mod: IPSPLIT | Performed by: NURSE PRACTITIONER

## 2025-06-05 PROCEDURE — 97535 SELF CARE MNGMENT TRAINING: CPT | Mod: GO,IPSPLIT

## 2025-06-05 PROCEDURE — 36415 COLL VENOUS BLD VENIPUNCTURE: CPT | Mod: IPSPLIT | Performed by: NURSE PRACTITIONER

## 2025-06-05 PROCEDURE — 99232 SBSQ HOSP IP/OBS MODERATE 35: CPT | Performed by: SURGERY

## 2025-06-05 PROCEDURE — 94760 N-INVAS EAR/PLS OXIMETRY 1: CPT | Mod: IPSPLIT

## 2025-06-05 PROCEDURE — 82374 ASSAY BLOOD CARBON DIOXIDE: CPT | Mod: IPSPLIT | Performed by: NURSE PRACTITIONER

## 2025-06-05 PROCEDURE — 99232 SBSQ HOSP IP/OBS MODERATE 35: CPT | Performed by: NURSE PRACTITIONER

## 2025-06-05 PROCEDURE — 1200000002 HC GENERAL ROOM WITH TELEMETRY DAILY: Mod: IPSPLIT

## 2025-06-05 PROCEDURE — 85027 COMPLETE CBC AUTOMATED: CPT | Mod: IPSPLIT | Performed by: HOSPITALIST

## 2025-06-05 PROCEDURE — 2500000004 HC RX 250 GENERAL PHARMACY W/ HCPCS (ALT 636 FOR OP/ED): Mod: IPSPLIT | Performed by: HOSPITALIST

## 2025-06-05 PROCEDURE — 36415 COLL VENOUS BLD VENIPUNCTURE: CPT | Mod: IPSPLIT | Performed by: HOSPITALIST

## 2025-06-05 PROCEDURE — 82947 ASSAY GLUCOSE BLOOD QUANT: CPT | Mod: IPSPLIT

## 2025-06-05 RX ADMIN — AMLODIPINE BESYLATE 10 MG: 10 TABLET ORAL at 08:12

## 2025-06-05 RX ADMIN — AMOXICILLIN AND CLAVULANATE POTASSIUM 1 TABLET: 500; 125 TABLET, FILM COATED ORAL at 08:11

## 2025-06-05 RX ADMIN — CALCIUM CARBONATE (ANTACID) CHEW TAB 500 MG 1 TABLET: 500 CHEW TAB at 20:36

## 2025-06-05 RX ADMIN — NYSTATIN 500000 UNITS: 100000 SUSPENSION ORAL at 16:37

## 2025-06-05 RX ADMIN — SODIUM BICARBONATE 650 MG: 650 TABLET ORAL at 08:11

## 2025-06-05 RX ADMIN — OXYCODONE HYDROCHLORIDE AND ACETAMINOPHEN 500 MG: 500 TABLET ORAL at 08:12

## 2025-06-05 RX ADMIN — METOPROLOL SUCCINATE 100 MG: 100 TABLET, EXTENDED RELEASE ORAL at 08:12

## 2025-06-05 RX ADMIN — APIXABAN 5 MG: 5 TABLET, FILM COATED ORAL at 20:36

## 2025-06-05 RX ADMIN — SENNOSIDES AND DOCUSATE SODIUM 2 TABLET: 50; 8.6 TABLET ORAL at 08:12

## 2025-06-05 RX ADMIN — AMOXICILLIN AND CLAVULANATE POTASSIUM 1 TABLET: 500; 125 TABLET, FILM COATED ORAL at 20:36

## 2025-06-05 RX ADMIN — SODIUM BICARBONATE 650 MG: 650 TABLET ORAL at 20:36

## 2025-06-05 RX ADMIN — CALCIUM CARBONATE (ANTACID) CHEW TAB 500 MG 1 TABLET: 500 CHEW TAB at 15:59

## 2025-06-05 RX ADMIN — NYSTATIN 500000 UNITS: 100000 SUSPENSION ORAL at 06:05

## 2025-06-05 RX ADMIN — NYSTATIN 500000 UNITS: 100000 SUSPENSION ORAL at 14:20

## 2025-06-05 RX ADMIN — SODIUM BICARBONATE 650 MG: 650 TABLET ORAL at 15:59

## 2025-06-05 RX ADMIN — APIXABAN 5 MG: 5 TABLET, FILM COATED ORAL at 08:12

## 2025-06-05 RX ADMIN — NYSTATIN 500000 UNITS: 100000 SUSPENSION ORAL at 20:36

## 2025-06-05 RX ADMIN — Medication 25 MCG: at 08:12

## 2025-06-05 RX ADMIN — CALCIUM CARBONATE (ANTACID) CHEW TAB 500 MG 1 TABLET: 500 CHEW TAB at 08:12

## 2025-06-05 RX ADMIN — Medication 1 TABLET: at 08:12

## 2025-06-05 ASSESSMENT — COGNITIVE AND FUNCTIONAL STATUS - GENERAL
PERSONAL GROOMING: A LITTLE
MOVING TO AND FROM BED TO CHAIR: A LITTLE
CLIMB 3 TO 5 STEPS WITH RAILING: A LITTLE
STANDING UP FROM CHAIR USING ARMS: A LITTLE
MOBILITY SCORE: 21
TOILETING: A LITTLE
HELP NEEDED FOR BATHING: A LITTLE
DAILY ACTIVITIY SCORE: 22
TOILETING: A LITTLE
MOBILITY SCORE: 21
CLIMB 3 TO 5 STEPS WITH RAILING: A LITTLE
DAILY ACTIVITIY SCORE: 19
HELP NEEDED FOR BATHING: A LITTLE
DRESSING REGULAR LOWER BODY CLOTHING: A LITTLE
STANDING UP FROM CHAIR USING ARMS: A LITTLE
DRESSING REGULAR UPPER BODY CLOTHING: A LITTLE
MOVING TO AND FROM BED TO CHAIR: A LITTLE

## 2025-06-05 ASSESSMENT — PAIN - FUNCTIONAL ASSESSMENT: PAIN_FUNCTIONAL_ASSESSMENT: 0-10

## 2025-06-05 ASSESSMENT — PAIN SCALES - GENERAL
PAINLEVEL_OUTOF10: 0 - NO PAIN

## 2025-06-05 ASSESSMENT — ACTIVITIES OF DAILY LIVING (ADL): HOME_MANAGEMENT_TIME_ENTRY: 30

## 2025-06-05 NOTE — CARE PLAN
"The patient's goals for the shift include \"get some sleep\"    The clinical goals for the shift include Pt will reman free from falls/inury throughout this shift      Problem: Pain - Adult  Goal: Verbalizes/displays adequate comfort level or baseline comfort level  Outcome: Progressing     Problem: Safety - Adult  Goal: Free from fall injury  Outcome: Progressing     Problem: Discharge Planning  Goal: Discharge to home or other facility with appropriate resources  Outcome: Progressing     Problem: Chronic Conditions and Co-morbidities  Goal: Patient's chronic conditions and co-morbidity symptoms are monitored and maintained or improved  Outcome: Progressing     Problem: Nutrition  Goal: Nutrient intake appropriate for maintaining nutritional needs  Outcome: Progressing     Problem: Arrythmia/Dysrhythmia  Goal: Lab values return to normal range  Outcome: Progressing     Problem: Fall/Injury  Goal: Not fall by end of shift  Outcome: Progressing  Goal: Be free from injury by end of the shift  Outcome: Progressing  Goal: Verbalize understanding of personal risk factors for fall in the hospital  Outcome: Progressing  Goal: Verbalize understanding of risk factor reduction measures to prevent injury from fall in the home  Outcome: Progressing  Goal: Use assistive devices by end of the shift  Outcome: Progressing  Goal: Pace activities to prevent fatigue by end of the shift  Outcome: Progressing     Problem: Seizure  Goal: I will remain free from seizures  Outcome: Progressing     Problem: Skin  Goal: Decreased wound size/increased tissue granulation at next dressing change  Outcome: Progressing  Goal: Participates in plan/prevention/treatment measures  Outcome: Progressing  Goal: Prevent/manage excess moisture  Outcome: Progressing  Goal: Prevent/minimize sheer/friction injuries  Outcome: Progressing  Goal: Promote/optimize nutrition  Outcome: Progressing  Goal: Promote skin healing  Outcome: Progressing   Pt has had stable " vitals. No complaints of pain. Pt has had increased leg swelling which was addressed to NP's in IT rounds. Pt has no new needs at this time. Call light within rEACH,.

## 2025-06-05 NOTE — PROGRESS NOTES
Mireya Nava is a 74 y.o. female on day 6 of admission presenting with Generalized weakness.      Subjective   Mrs. Tse is resting sitting up in a chair. She complains of swelling in her legs. . She is feeling much better. We discussed the POC; she was told about her bacteremia. She thought she ate something that made her sick. She denies pain, fever, chills, N/V/D/C. We did discuss her atrial fibrillation today. Sodium has improved.        Objective     Last Recorded Vitals  /59 (BP Location: Right arm)   Pulse 72   Temp 36.9 °C (98.4 °F) (Temporal)   Resp 17   Wt 76.2 kg (167 lb 15.9 oz)   SpO2 100%   Intake/Output last 3 Shifts:    Intake/Output Summary (Last 24 hours) at 6/5/2025 1204  Last data filed at 6/5/2025 0716  Gross per 24 hour   Intake 720 ml   Output 1400 ml   Net -680 ml       Admission Weight  Weight: 65.8 kg (145 lb) (05/30/25 1036)    Daily Weight  06/05/25 : 76.2 kg (167 lb 15.9 oz)    Image Results      Physical Exam  Vitals reviewed.   Constitutional:       Appearance: Normal appearance. She is normal weight.   HENT:      Head: Normocephalic and atraumatic.      Right Ear: External ear normal.      Left Ear: External ear normal.      Nose: Nose normal.      Mouth/Throat:      Mouth: Mucous membranes are moist.      Pharynx: Oropharynx is clear.   Eyes:      Conjunctiva/sclera: Conjunctivae normal.      Pupils: Pupils are equal, round, and reactive to light.   Cardiovascular:      Rate and Rhythm: Normal rate. Rhythm irregular.      Pulses: Normal pulses.      Heart sounds: Murmur heard.   Pulmonary:      Effort: Pulmonary effort is normal.      Breath sounds: Normal breath sounds.   Abdominal:      General: Bowel sounds are normal.      Palpations: Abdomen is soft.   Musculoskeletal:         General: Normal range of motion.      Cervical back: Normal range of motion and neck supple.   Skin:     General: Skin is warm and dry.   Neurological:      General: No focal deficit  present.      Mental Status: She is alert and oriented to person, place, and time.   Psychiatric:         Mood and Affect: Mood normal.         Behavior: Behavior normal.         Relevant Results    Scheduled medications  Scheduled Medications[1]  Continuous medications  Continuous Medications[2]  PRN medications  PRN Medications[3]    Results for orders placed or performed during the hospital encounter of 05/30/25 (from the past 24 hours)   POCT GLUCOSE   Result Value Ref Range    POCT Glucose 116 (H) 74 - 99 mg/dL   Basic metabolic panel   Result Value Ref Range    Glucose 122 (H) 74 - 99 mg/dL    Sodium 124 (L) 136 - 145 mmol/L    Potassium 3.8 3.5 - 5.3 mmol/L    Chloride 93 (L) 98 - 107 mmol/L    Bicarbonate 22 21 - 32 mmol/L    Anion Gap 13 10 - 20 mmol/L    Urea Nitrogen 49 (H) 6 - 23 mg/dL    Creatinine 2.33 (H) 0.50 - 1.05 mg/dL    eGFR 21 (L) >60 mL/min/1.73m*2    Calcium 8.2 (L) 8.6 - 10.3 mg/dL   POCT GLUCOSE   Result Value Ref Range    POCT Glucose 131 (H) 74 - 99 mg/dL   CBC   Result Value Ref Range    WBC 15.7 (H) 4.4 - 11.3 x10*3/uL    nRBC 0.0 0.0 - 0.0 /100 WBCs    RBC 2.41 (L) 4.00 - 5.20 x10*6/uL    Hemoglobin 7.3 (L) 12.0 - 16.0 g/dL    Hematocrit 20.1 (L) 36.0 - 46.0 %    MCV 83 80 - 100 fL    MCH 30.3 26.0 - 34.0 pg    MCHC 36.3 (H) 32.0 - 36.0 g/dL    RDW 14.5 11.5 - 14.5 %    Platelets 309 150 - 450 x10*3/uL   Basic metabolic panel   Result Value Ref Range    Glucose 109 (H) 74 - 99 mg/dL    Sodium 127 (L) 136 - 145 mmol/L    Potassium 3.8 3.5 - 5.3 mmol/L    Chloride 95 (L) 98 - 107 mmol/L    Bicarbonate 20 (L) 21 - 32 mmol/L    Anion Gap 16 10 - 20 mmol/L    Urea Nitrogen 45 (H) 6 - 23 mg/dL    Creatinine 1.92 (H) 0.50 - 1.05 mg/dL    eGFR 27 (L) >60 mL/min/1.73m*2    Calcium 8.2 (L) 8.6 - 10.3 mg/dL   POCT GLUCOSE   Result Value Ref Range    POCT Glucose 103 (H) 74 - 99 mg/dL                   This patient has a urinary catheter   Reason for the urinary catheter remaining today? Urine  catheter unnecessary, will be removed today    Assessment & Plan  Primary hypertension    Hyperlipidemia    Hypo-osmolar hyponatremia    TAMMY (acute kidney injury)    Atrial fibrillation (Multi)    UTI (urinary tract infection)    Acute urinary retention    Severe hypo-osmolar hyponatremia, improving  - Patient is alert and oriented to self, birthday, president and year and appears mentating well.  She does have some generalized weakness but no obvious gross focal neurologic deficits.  - 5/30 serum osmol 251  - TSH 0.58  - Na 109>111>112>114>113>120 > 121>127  - cortisol 28.5  - urine Na 22 - consistent with hypovolemic hyponatremia, likely secondary to dehydration  - patient is not on any obvious home medications to cause hyponatremia.  - Sodium was 130 on 5/30/2024.  - 3% hypertonic saline at 15 mL/h for 4 hours x 2  - ICU monitoring  - Goal to achieve a 24-hour increase in serum sodium of 4 to 6 mEq/L.   - seizure precautions.  - Q6 BMP  - appetite at baseline  - continue sodium bicarb 650 mg TID     Acute kidney injury, improving  - Likely secondary to dehydration with decreased oral intake.  - creat 2.68 > 2.88 > 3.25 > 3.21 > 3.20> Today Creatine 1.92  - urine creat 71.1 > 34.8  - urine sodium 23, Fena calculated showing likely pre-renal cause  - Patient given 1 L of IV fluids in the ER.  As above, will place on hypertonic saline at 15 mL/h for 4 hours and check BMPs every 4 hours (complete)  - 5/31 received D5 .45 overnight - bolus 100ml 3% NS this am and then 0.9NaCl @125ml/hr and monitor q4hr BMP   - CT scan of the abdomen pelvis with no signs of hydronephrosis, powell placed for retention  - Hold home medication of losartan with acute kidney injury.  - daily BMP     Nonspecific enteritis  - Dr. Garner consult, appreciate recs, feels no surgical need at this time   - tolerating diet  - WBC 19.8>18.8>19.3>19.9>15.1>15.7  - blood culture: grew E. Coli  - continue Augmentin 875 mg BID  - ID consult     New onset  atrial fibrillation  Essential HTN  AAA  - continue metoprolol succinate 100 mg daily, amlodipine 10 mg daily  - hold losartan  - discontinued heparin gtt   - continue apixaban 5 mg BID  - echo: normal LVSF with an EF of 65-70%. Moderate mitral regurgitation. Moderate to severe tricuspid regurgitation. Mild aortic  regurgitation.  9. A bubble study using agitated saline was performed. Bubble study is positive. A large PFO (> 20 bubbles) was demonstrated  - Consult cardiology, recommending outpatient cardioversion after being apixaban for 1 month  - cardiac monitoring via telemetry  - 4 cm ascending thoracic aortic aneurysm found incidentally on CT scan  - Recommend follow-up with primary care provider and cardiothoracic surgery as an outpatient.  - patient is aware of findings   - monitor BP and HR     Acute cystitis with hematuria  Bacteremia  - UA 2+ blood; 3+ leukocytes, > 50 WBC; 4+bact  - urine culture grew E. Coli  - Blood cultures 4/4 positive for E. Coli  - Given ceftriaxone and zosyn  - continue augmentin 875 mg BID x 14 days  - consult ID, appreciate recs      Urinary retention  - Campbell catheter   - strict IO  - void trial today; removed Campbell  - Good urine output 10,105 mL in the past 24hr    Iron Deficiency  Normocytic Anemia  - Hb 7.8/MCV 82  - Iron 25; ; % sat 14  - given IV venofer 300 mg once  - started ferrous gluconate 324 mg daily with ascorbic acid 500 mg BID    Vitamin D deficiency  - continue cholecalciferol 25 mcg daily    Oral Candidiasis  - continue nystatin 500,000 units QID            DVT ppx:   - discontinued heparin gtt  - continue apixaban 5 mg BID     Code Status: full    Disposition: Pt requires inpatient stay at this time.      Rose Shaw, APRN-CNP       [1] amLODIPine, 10 mg, oral, Daily  amoxicillin-clavulanate, 1 tablet, oral, BID  apixaban, 5 mg, oral, BID  ascorbic acid, 500 mg, oral, Daily  calcium carbonate, 500 mg of calcium carbonate, oral,  TID  cholecalciferol, 25 mcg, oral, Daily  ferrous gluconate, 38 mg of elemental iron, oral, Daily with breakfast  insulin lispro, 0-10 Units, subcutaneous, TID AC  [Held by provider] losartan, 100 mg, oral, Daily  metoprolol succinate XL, 100 mg, oral, Daily  nystatin, 5 mL, Swish & Spit, 4x daily  sennosides-docusate sodium, 2 tablet, oral, BID  sodium bicarbonate, 650 mg, oral, TID     [2] sodium chloride 0.9%, 10 mL/hr     [3] PRN medications: acetaminophen **OR** [DISCONTINUED] acetaminophen **OR** [DISCONTINUED] acetaminophen, dextromethorphan-guaifenesin, ipratropium-albuteroL, magnesium hydroxide, oxyCODONE, oxygen, sodium chloride 0.9%

## 2025-06-05 NOTE — CARE PLAN
"The patient's goals for the shift include \"get some sleep\"    The clinical goals for the shift include Patient will be safe from fall/injury this shift    Assumed care of patient at 1930. Patient denies pain this shift. Has been ambulating with x1 assist to and from restroom. Purewick placed to for accurate I's+Os overnight, but still encouraged to ambulate to restroom throughout shift. Output thus far is 1,000mLs and Input 240mLs. Has been cooperative with safety measures. Pt's  at HS. Patient resting in bed with call light within reach.  "

## 2025-06-05 NOTE — PROGRESS NOTES
06/05/25 1105   Discharge Planning   Expected Discharge Disposition SNF  (Patient will go to [facility] when medically ready. Provider would like to see sodium improve a bit more prior to discharging. Facility aware. No barriers when medically ready.)   Intensity of Service   Intensity of Service 0-30 min

## 2025-06-05 NOTE — PROGRESS NOTES
Occupational Therapy    Occupational Therapy Treatment    Name: Mireya Nava  MRN: [id number]  Department: [facility]  Room: [address] [place] [address]  Date: 06/05/25  Time Calculation  Start Time: 0900  Stop Time: 0930  Time Calculation (min): 30 min    Assessment:  OT Assessment: Pt is a 75 yo F referred to occupational therapy for impaired self-care and functional mobility 2/2 hospitalization for generalized weakness. Pt demonstrates good progress towards goals this date completing grooming tasks standing sinkside w/ close supervision and toileting w/ close supervision/ CGA. Pt would continue to benefit from OT services at the MOD intensity level to increase safety and functional independence.  Prognosis: Good  Barriers to Discharge Home: Caregiver assistance, Physical needs  Caregiver Assistance: Patient lives alone and/or does not have reliable caregiver assistance  Physical Needs: Stair navigation into home limited by function/safety, 24hr mobility assistance needed, Intermittent ADL assistance needed, High falls risk due to function or environment  Evaluation/Treatment Tolerance: Patient tolerated treatment well  Medical Staff Made Aware: Yes  End of Session Communication: Bedside nurse  End of Session Patient Position: Up in chair, Alarm on  Plan:  Treatment Interventions: ADL retraining, Functional transfer training, Endurance training, Patient/family training, Equipment evaluation/education, Compensatory technique education  OT Frequency: 3 times per week  OT Discharge Recommendations: Moderate intensity level of continued care  Equipment Recommended upon Discharge: Wheeled walker  OT Recommended Transfer Status: Stand by assist, Assist of 1  OT - OK to Discharge: Yes (Based on completed evaluation and care plan recommendations, no barriers to discharge to next site of care)    Subjective     OT Visit Info:  OT Received On: 06/05/25  General:  General  Reason for Referral: Pt is a 75 yo F referred to occupational therapy  for impaired self-care and functional mobility 2/2 hospitalization for generalized weakness.  Referred By: Jung Esparza DO  Past Medical History Relevant to Rehab: HTN, anemia, detached retina s/p repair  Family/Caregiver Present: No  Prior to Session Communication: Bedside nurse  Patient Position Received: Up in chair, Alarm on  Preferred Learning Style: verbal, written  General Comment: Pt pleasant and agreeable to therapy session. Pt cleared by RN prior to session.  Precautions:  Medical Precautions: Fall precautions  Precautions Comment: tele, masimo     Date/Time Vitals Session Patient Position Pulse Resp SpO2 BP MAP (mmHg)    06/05/25 1130 --  --  72  17  100 %  129/59  82           Pain Assessment:  Pain Assessment  Pain Assessment: 0-10  0-10 (Numeric) Pain Score: 0 - No pain    Objective   Cognition:  Overall Cognitive Status: Within Functional Limits  Arousal/Alertness: Appropriate responses to stimuli  Orientation Level: Oriented X4  Activities of Daily Living:    Grooming  Grooming Level of Assistance: Close supervision  Grooming Where Assessed: Standing sinkside  Grooming Comments: Pt completed grooming tasks standing sinkside including washing faec/hands, oral hygiene w/ close supervision. Pt able to obtain all materails from counter with good balance.    Functional Standing Tolerance:  Functional Standing Tolerance  Time: 8 minutes  Activity: Standing ADLs sinkside  Functional Standing Tolerance Comments: Good balance throughout, no LOB noted  Bed Mobility/Transfers:    Transfers  Transfer: Yes  Transfer 1  Transfer From 1: Chair with arms to  Transfer to 1: Stand  Technique 1: Sit to stand, Stand to sit  Transfer Device 1: Walker  Transfer Level of Assistance 1: Close supervision  Transfers 2  Transfer From 2: Toilet to  Transfer to 2: Stand  Technique 2: Sit to stand, Stand to sit  Transfer Device 2: Walker  Transfer Level of Assistance 2: Contact guard    Functional Mobility:  Functional  Mobility  Functional Mobility Performed: Yes  Functional Mobility 1  Surface 1: Level tile  Device 1: Rolling walker  Assistance 1: Close supervision  Comments 1: Short functional mobility in room to and from sink/bathroom  Sitting Balance:  Static Sitting Balance  Static Sitting-Balance Support: Feet supported  Static Sitting-Level of Assistance: Independent  Dynamic Sitting Balance  Dynamic Sitting-Balance Support: Feet supported  Dynamic Sitting-Level of Assistance: Independent  Dynamic Sitting-Balance: Forward lean  Standing Balance:  Static Standing Balance  Static Standing-Balance Support: Bilateral upper extremity supported  Static Standing-Level of Assistance: Close supervision  Dynamic Standing Balance  Dynamic Standing-Balance Support: Bilateral upper extremity supported  Dynamic Standing-Level of Assistance: Close supervision  Dynamic Standing-Balance: Turning, Reaching for objects, Reaching across midline    Strength:  Strength  Strength Comments: BUE grossly 4-/5  RUE: Within Functional Limits   LUE: Within Functional Limits    Outcome Measures:  [facility] Daily Activity  Putting on and taking off regular lower body clothing: A little  Bathing (including washing, rinsing, drying): A little  Putting on and taking off regular upper body clothing: A little  Toileting, which includes using toilet, bedpan or urinal: A little  Taking care of personal grooming such as brushing teeth: A little  Eating Meals: None  Daily Activity - Total Score: 19    OT Adult Other Outcome Measures  4AT: 0/12  Patient scored 0/12 on 4AT indicatin considered delirium or severe cognitive impairment unlikely.    Patient's 4AT score:  Alertness: 0 - Normal (fully alert, but not agitated, throughout assessment)   AMT4: 0 - No mistakes   Attention: 0 - Achieves 7 months or more correctly   Acute change or fluctuating course: 0 - No    4 AT Score: 0/12     4 AT is a standardized assessment completed with patient to assess delirium. The  4AT assesses 4 items including alertness, abbreviated mental test (AMT4), Attention, and acute change or fluctuating course.     This interpretation should not be used as a medical diagnosis and further medical assessment would be required.     Education Documentation  Body Mechanics, taught by Aliza Wing OT at 6/5/2025 11:44 AM.  Learner: Patient  Readiness: Acceptance  Method: Explanation  Response: Verbalizes Understanding  Comment: Pt educated on POC, DC recs, safety and body mechanics when completing transfers and ADLs    ADL Training, taught by Aliza Wing OT at 6/5/2025 11:44 AM.  Learner: Patient  Readiness: Acceptance  Method: Explanation  Response: Verbalizes Understanding  Comment: Pt educated on POC, DC recs, safety and body mechanics when completing transfers and ADLs    Goals:  Encounter Problems       Encounter Problems (Active)       ADLs       Patient will perform UB and LB bathing with stand by assist level of assistance.       Start:  06/02/25    Expected End:  06/16/25            Patient with complete upper body dressing with modified independent level of assistance donning and doffing all UE clothes with PRN adaptive equipment while supported sitting       Start:  06/02/25    Expected End:  06/16/25            Patient with complete lower body dressing with modified independent level of assistance donning and doffing all LE clothes  with PRN adaptive equipment while supported sitting       Start:  06/02/25    Expected End:  06/16/25            Patient will complete daily grooming tasks brushing teeth and washing face/hair with modified independent level of assistance and PRN adaptive equipment while standing. (Progressing)       Start:  06/02/25    Expected End:  06/16/25            Patient will complete toileting including hygiene clothing management/hygiene with minimal assist  level of assistance. (Progressing)       Start:  06/02/25    Expected End:  06/16/25               MOBILITY        Patient will perform Functional mobility mod  Household distances/Community Distances with modified independent level of assistance and least restrictive device in order to improve safety and functional mobility. (Progressing)       Start:  06/02/25    Expected End:  06/16/25               TRANSFERS       Patient will complete functional transfer to chair, bed, commode with least restrictive device with modified independent level of assistance. (Progressing)       Start:  06/02/25    Expected End:  06/16/25

## 2025-06-05 NOTE — PROGRESS NOTES
Mireya Nava is a 74 y.o. female on day 6 of admission presenting with Generalized weakness.    Subjective   Interval History:   Afebrile, no chills  No cough, chest pain or shortness of breath  No nausea vomiting or diarrhea  Feeling better        Review of Systems   All other systems reviewed and are negative.      Objective   Range of Vitals (last 24 hours)  Heart Rate:  [67-95]   Temp:  [35.8 °C (96.4 °F)-37 °C (98.6 °F)]   Resp:  [13-27]   BP: (121-151)/()   Weight:  [76.2 kg (167 lb 15.9 oz)]   SpO2:  [77 %-100 %]   Daily Weight  06/05/25 : 76.2 kg (167 lb 15.9 oz)    Body mass index is 28.84 kg/m².    Physical Exam  Constitutional:       Appearance: Normal appearance.   HENT:      Head: Normocephalic and atraumatic.      Right Ear: External ear normal.      Left Ear: External ear normal.      Nose: Nose normal.   Eyes:      General: No scleral icterus.     Extraocular Movements: Extraocular movements intact.      Conjunctiva/sclera: Conjunctivae normal.   Cardiovascular:      Rate and Rhythm: Normal rate and regular rhythm.   Musculoskeletal:      Cervical back: Normal range of motion and neck supple.   Neurological:      Mental Status: She is alert.     Antibiotics  amoxicillin-clavulanate - 500-125 mg  nystatin - 100,000 unit/mL    Relevant Results  Labs  Results from last 72 hours   Lab Units 06/05/25  0648 06/04/25  0532 06/03/25  0457   WBC AUTO x10*3/uL 15.7* 15.6* 15.1*   HEMOGLOBIN g/dL 7.3* 8.0* 7.8*   HEMATOCRIT % 20.1* 22.2* 20.9*   PLATELETS AUTO x10*3/uL 309 261 204     Results from last 72 hours   Lab Units 06/05/25  0648 06/04/25  1806 06/03/25  1155   SODIUM mmol/L 127* 124* 121*   POTASSIUM mmol/L 3.8 3.8 4.1   CHLORIDE mmol/L 95* 93* 92*   CO2 mmol/L 20* 22 17*   BUN mg/dL 45* 49* 58*   CREATININE mg/dL 1.92* 2.33* 3.08*   GLUCOSE mg/dL 109* 122* 106*   CALCIUM mg/dL 8.2* 8.2* 7.9*   ANION GAP mmol/L 16 13 16   EGFR mL/min/1.73m*2 27* 21* 15*         Estimated Creatinine  "Clearance: 24.9 mL/min (A) (by C-G formula based on SCr of 1.92 mg/dL (H)).  No results found for: \"CRP\"  Microbiology  Susceptibility data from last 14 days.  Collected Specimen Info Organism Ampicillin Cefazolin Cefazolin (uncomplicated UTIs only) Ciprofloxacin Gentamicin Levofloxacin Nitrofurantoin Piperacillin/Tazobactam Trimethoprim/Sulfamethoxazole   05/30/25 Urine from Clean Catch/Voided Escherichia coli  S  S  S  S  S  S  S  S  S   05/30/25 Blood culture from Peripheral Venipuncture Escherichia coli            05/30/25 Blood culture from Peripheral Venipuncture Escherichia coli  S  S   S  S  S   S  S     Imaging  ECG 12 lead  Result Date: 6/4/2025  Atrial fibrillation Cannot rule out Anterior infarct , age undetermined Abnormal ECG When compared with ECG of 02-JUN-2025 11:27, (unconfirmed) Atrial fibrillation has replaced Sinus rhythm    XR chest 1 view  Result Date: 6/4/2025  Interpreted By:  Aaron Hanna, STUDY: XR CHEST 1 VIEW;  6/4/2025 10:51 am   INDICATION: Signs/Symptoms:per nuclear request.     COMPARISON: Chest x-ray 06/01/2025   ACCESSION NUMBER(S): [id number]   ORDERING CLINICIAN: ALEXA PROCTOR   FINDINGS: AP radiograph of the chest was provided.       CARDIOMEDIASTINAL SILHOUETTE: Cardiomediastinal silhouette is normal in size and configuration.   LUNGS: Lungs are clear.   ABDOMEN: No remarkable upper abdominal findings.   BONES: No acute osseous changes.       1.  No evidence of acute cardiopulmonary process.       MACRO: None   Signed by: Aaron Hanna 6/4/2025 4:09 PM Dictation workstation:   [id number]    NM Lung perfusion with spect  Result Date: 6/4/2025  Interpreted By:  Floyd Donnelly and Korakavi Nisha STUDY: NM LUNG PERFUSION WITH SPECT;  6/4/2025 9:22 am   INDICATION: Signs/Symptoms:elevated d dimer.     COMPARISON: None.   ACCESSION NUMBER(S): [id number]   ORDERING CLINICIAN: LUKE YEUNG   TECHNIQUE: DIVISION OF NUCLEAR MEDICINE VENTILATION/PERFUSION LUNG " SCANS   Perfusion images of the lungs were then acquired after the intravenous administration of  4.3 mCi of Tc-99m macroaggregated albumin (MAA). Additionally, SPECT images were obtained.   FINDINGS: Perfusion images demonstrate a normal distribution of radiopharmaceutical throughout the lung fields.  There are no segmental or subsegmental perfusion abnormalities.       Normal perfusion lung scan with no evidence of segmental or subsegmental perfusion abnormality.   I personally reviewed the images/study and I agree with the findings as stated by Resident Dr. Vane Renteria MD. This study was interpreted at [place], [place].   MACRO: None   Signed by: Floyd Donnelly 6/4/2025 11:18 AM Dictation workstation:   [id number]    Transthoracic Echo Complete  Result Date: 6/2/2025   [facility], [address] [place] [address]              Tel [phone] and Fax [phone] TRANSTHORACIC ECHOCARDIOGRAM REPORT  Patient Name:       FANNY BREEZY DARLING   Reading Physician:    [id number] Rosanna Arevalo MD Study Date:         6/2/2025            Ordering Provider:    [id number] ELICEO WONG MRN/PID:            [id number]            Fellow: Accession#:         [id number]        Nurse:                Bev Luna RN Date of Birth/Age:  1950 / 74      Sonographer:          Aria Dixon RDCS                     years Gender assigned at  F                   Additional Staff: Birth: Height:             162.56 cm           Admit Date: Weight:             78.93 kg            Admission Status:     Inpatient -                                                               Routine BSA / BMI:          1.84 m2 / 29.87     Encounter#:           [id number]                     kg/m2 Blood Pressure:     108/61 mmHg         Department Location:  [facility]  Study Type:    TRANSTHORACIC ECHO (TTE) COMPLETE Diagnosis/ICD: Unspecified atrial fibrillation-I48.91 Indication:    AFib CPT Code:      Echo Complete w Full Doppler-[id number] Patient History: Pertinent History: A-Fib, HTN, Hyperlipidemia and Murmur. Weakness. Study Detail: The following Echo studies were performed: 2D, M-Mode, Doppler and               color flow. Agitated saline used as a contrast agent for               intraseptal flow evaluation. The patient was awake.  PHYSICIAN INTERPRETATION: Left Ventricle: Left ventricular ejection fraction is normal by visual estimate at 65-70%. There are no regional left ventricular wall motion abnormalities. The left ventricular cavity size is normal. There is mildly increased septal and mildly increased posterior left ventricular wall thickness. There is left ventricular concentric remodeling. Spectral Doppler shows a normal pattern of left ventricular diastolic filling. Left Atrium: The left atrium is mildly dilated. The patent foramen ovale was visualized using agitated saline contrast. A bubble study using agitated saline was performed. Bubble study is positive. A large PFO (> 20 bubbles) was demonstrated. Right Ventricle: The right ventricle is mildly enlarged. There is normal right ventricular global systolic function. Right Atrium: The right atrium is mildly dilated. Aortic Valve: There is mild aortic valve regurgitation. Mitral Valve: The mitral valve is normal in structure. There is moderate mitral valve regurgitation. The E Vmax is 1.02 m/s. Tricuspid Valve: The tricuspid valve is structurally normal. There is moderate to severe tricuspid regurgitation. The doppler estimated RVSP is within normal limits with a right ventricular systolic pressure of 30 mmHg. Pulmonic Valve: The pulmonic valve is structurally normal. There is physiologic pulmonic valve regurgitation. Pericardium: There is no pericardial effusion noted. Aorta: The aortic root is normal.   CONCLUSIONS:  1. Left ventricular ejection fraction is normal by visual estimate at 65-70%.  2. There is normal right ventricular global systolic function.  3. Mildly enlarged right ventricle.  4. The left atrium is mildly dilated.  5. Moderate mitral valve regurgitation.  6. Moderate to severe tricuspid regurgitation visualized.  7. The doppler estimated RVSP is within normal limits with a right ventricular systolic pressure of 30 mmHg.  8. Mild aortic valve regurgitation.  9. A bubble study using agitated saline was performed. Bubble study is positive. A large PFO (> 20 bubbles) was demonstrated. QUANTITATIVE DATA SUMMARY:  2D MEASUREMENTS:          Normal Ranges: Ao Root d:       3.20 cm  (2.0-3.7cm) LAs:             3.90 cm  (2.7-4.0cm) IVSd:            1.20 cm  (0.6-1.1cm) LVPWd:           1.19 cm  (0.6-1.1cm) LVIDd:           3.69 cm  (3.9-5.9cm) LVIDs:           2.39 cm LV Mass Index:   79 g/m2 LVEDV Index:     44 ml/m2 LV % FS          35.2 %  LEFT ATRIUM:                  Normal Ranges: LA Vol A4C:        41.6 ml    (22+/-6mL/m2) LA Vol A2C:        69.7 ml LA Vol BP:         61.0 ml LA Vol Index A4C:  22.6ml/m2 LA Vol Index A2C:  37.8 ml/m2 LA Vol Index BP:   33.1 ml/m2 LA Area A4C:       15.0 cm2 LA Area A2C:       22.0 cm2 LA Major Axis A4C: 4.6 cm LA Major Axis A2C: 5.9 cm  RIGHT ATRIUM:                 Normal Ranges: RA Vol A4C:        31.9 ml    (8.3-19.5ml) RA Vol Index A4C:  17.3 ml/m2 RA Area A4C:       15.0 cm2 RA Major Axis A4C: 6.0 cm  AORTA MEASUREMENTS:         Normal Ranges: Asc Ao, d:          3.50 cm (2.1-3.4cm)  LV SYSTOLIC FUNCTION:                      Normal Ranges: EF-A4C View:    71 % (>=55%) EF-A2C View:    71 % EF-Biplane:     72 % EF-Visual:      68 % LV EF Reported: 68 %  LV DIASTOLIC FUNCTION:            Normal Ranges: MV Peak E:             1.02 m/s   (0.7-1.2 m/s) MV e'                  0.151 m/s  (>8.0) MV lateral e'          0.18 m/s MV medial e'           0.13 m/s E/e' Ratio:             6.75       (<8.0) PulmV Sys Danny:         38.10 cm/s PulmV Godfrey Danny:        46.90 cm/s PulmV S/D Danny:         0.80  MITRAL VALVE:          Normal Ranges: MV DT:        213 msec (150-240msec)  AORTIC VALVE:            Normal Ranges: AoV Vmax:      1.80 m/s  (<=1.7m/s) AoV Peak P.0 mmHg (<20mmHg) LVOT Max Danny:  1.12 m/s  (<=1.1m/s) LVOT VTI:      19.80 cm LVOT Diameter: 2.00 cm   (1.8-2.4cm) AoV Area,Vmax: 1.95 cm2  (2.5-4.5cm2)  AORTIC INSUFFICIENCY: AI Vmax:       3.18 m/s AI Half-time:  615 msec AI Decel Rate: 152.00 cm/s2  RIGHT VENTRICLE: RV Basal 4.70 cm RV Mid   3.90 cm RV Major 7.9 cm TAPSE:   24.4 mm RV s'    0.13 m/s  TRICUSPID VALVE/RVSP:          Normal Ranges: Peak TR Velocity:     2.58 m/s Est. RA Pressure:     3 RV Syst Pressure:     30       (< 30mmHg) IVC Diam:             1.48 cm  PULMONIC VALVE:          Normal Ranges: PV Max Danny:     1.3 m/s  (0.6-0.9m/s) PV Max P.4 mmHg  PULMONARY VEINS: PulmV Godfrey Danny: 46.90 cm/s PulmV S/D Danny:  0.80 PulmV Sys Danny:  38.10 cm/s  [id number] Rosanna Arevalo MD Electronically signed on 2025 at 9:27:59 PM  ** Final **     XR abdomen 1 view  Result Date: 2025  Interpreted By:  Aaron Hanna, STUDY: XR ABDOMEN 1 VIEW;  2025 4:19 pm   INDICATION: Signs/Symptoms:n/v.     COMPARISON: CT chest abdomen and pelvis 2020   ACCESSION NUMBER(S): [id number]   ORDERING CLINICIAN: LUKE YEUNG   FINDINGS: Nonobstructive bowel gas pattern. Limited evaluation of pneumoperitoneum on supine imaging, however no gross evidence of free air is noted.   Visualized lungs are clear.   Osseous structures demonstrate no acute bony changes.       1.  Unremarkable exam.   MACRO: None   Signed by: Aaron Hanna 2025 4:30 PM Dictation workstation:   [id number]    Electrocardiogram, 12-lead PRN ACS symptoms  Result Date: 2025  Sinus rhythm with Premature atrial complexes Otherwise normal ECG When compared with ECG of 30-MAY-2025  11:11, (unconfirmed) Sinus rhythm has replaced Atrial fibrillation    ECG 12 lead  Result Date: 6/2/2025  Atrial fibrillation Abnormal ECG When compared with ECG of 27-DEC-2005 11:33, Atrial fibrillation has replaced Sinus rhythm Vent. rate has increased BY  31 BPM Nonspecific T wave abnormality now evident in Anterior leads    XR chest 1 view  Result Date: 6/1/2025  Interpreted By:  Myrtle Duque, STUDY: XR CHEST 1 VIEW;  6/1/2025 1:37 am   INDICATION: Signs/Symptoms:change in breathing     COMPARISON: CT chest, abdomen, pelvis 05/30/2025.   ACCESSION NUMBER(S): [id number]   ORDERING CLINICIAN: ALEXA PROCTOR   TECHNIQUE: Portable upright frontal view of the chest was obtained .   FINDINGS: Monitoring leads are overlying the patient.   The heart is enlarged. There is mild interstitial edema.   There is a small left pleural effusion with airspace opacity at the left lung base. No pneumothorax.       1.  Cardiomegaly. Mild interstitial edema. Small left pleural effusion with airspace opacity at the left lung base, may be secondary to atelectasis or pneumonia.       MACRO: None.   Signed by: Myrtle Duque 6/1/2025 3:24 AM Dictation workstation:   [id number]    CT chest abdomen pelvis wo IV contrast  Result Date: 5/30/2025  Interpreted By:  Shanel Valencia, STUDY: CT CHEST ABDOMEN PELVIS WO CONTRAST;  5/30/2025 12:50 pm   INDICATION: Signs/Symptoms:cough, abd pain.   COMPARISON: None.   ACCESSION NUMBER(S): [id number]   ORDERING CLINICIAN: DARA WELCH   TECHNIQUE: CT of the chest, abdomen, and pelvis was performed without intravenous contrast.   FINDINGS:   CHEST:   Lines/Devices: None   Lungs: The trachea and central airways are patent without endobronchial lesions. There is a small left pleural effusion with adjacent compressive atelectasis. No focal consolidation, pulmonary edema, pneumothorax or suspicious nodule.   Vasculature: Mild dilation of the thoracic aorta in the ascending segment measuring 4  cm. The main pulmonary artery is normal in size. Mild coronary artery calcifications noted in the LAD.   Heart: Heart size is normal. No pericardial effusion.   Mediastinum and lyudmila: No pathologically enlarged thoracic lymphadenopathy. The esophagus is unremarkable.   Chest wall and lower neck: No significant chest wall soft tissue abnormalities. The visualized thyroid is normal.   ABDOMEN/PELVIS:   Liver: No mass. Hepatomegaly measuring 20 cm in the craniocaudal dimension.   Biliary: No intrahepatic or extrahepatic bile duct dilation. The gallbladder is collapsed and limited for evaluation.   Spleen: No mass. No splenomegaly.   Pancreas: No mass, main duct dilation or acute inflammation.   Adrenals: Normal.   Kidneys: No calculus or hydronephrosis.   GI tract: There is a small amount of free fluid and fat stranding centered around the cecum. The appendix is not identified. No pericecal free air or organized fluid collection. There are multiple loops of fluid-filled, nondilated small bowel in the pelvis. No bowel obstruction or bowel wall thickening otherwise.   Lymph nodes: No abdominopelvic lymphadenopathy.   Mesentery/peritoneum: No free air or fluid collection. There is mild generalized haziness involving the peritoneum.   Vasculature: Moderate abdominal aortic atherosclerotic calcifications without aneurysmal dilation.   Pelvis: No free air or fluid collection. Trace free fluid in the dependent pelvis. The bladder is moderate distended but otherwise normal-appearing. The uterus appears grossly unremarkable.   Bones/Soft tissues: Mild levocurvature of the lumbar spine with multilevel thoracolumbar degenerative disc disease. Moderate to severe bilateral hip osteoarthritis. Mild abdominal wall edema.         Limited examination without intravenous contrast.   The appendix is not visualized, although right lower quadrant inflammation and free fluid centered around the cecum raise concern for possible acute  appendicitis. No organized fluid collection. Please correlate with right lower quadrant tenderness.   Multiple fluid-filled small bowel within the pelvis could represent nonspecific enteritis. No bowel obstruction.   Small left pleural effusion without definite evidence of pneumonia.   4 cm ascending thoracic aortic aneurysm.   DAYTON Valencia discussed the significance and urgency of this critical finding by Epic secure chat with  DARA WELCH on 5/30/2025 at 2:03 pm.  (**-RCF-**) Findings:  See findings.   Signed by: Shanel Valencia 5/30/2025 2:05 PM Dictation workstation:   [id number]      Assessment/Plan   Sepsis secondary to E. coli bacteremia  E. coli bacteremic, possibly secondary to urinary tract infection  E. coli urinary tract infection  Acute kidney injury, resolving  Leukocytosis, multifactorial        Augmentin  Monitor renal function  Supportive care  Monitor temperature and WBC  Long-term plan is total of 14 days of antibiotic therapy  Plan discussed with primary team     This is a complex infectious disease issue and the following was performed today (for more details please see the above note): Management decisions reflecting the added complexity (e.g., changes in antimicrobial therapy, infection control strategies).     Nemesio Hansen MD

## 2025-06-05 NOTE — PROGRESS NOTES
"Mireya Nava is a 74 y.o. female on day 6 of admission presenting with Generalized weakness.    Subjective   Patient is feeling much better.  No pain in the abdomen.  Sodium improving.  Creatinine improving       Objective     Physical Exam  Constitutional:       Appearance: Normal appearance.   Abdominal:      Palpations: Abdomen is soft. There is no mass.      Tenderness: There is no abdominal tenderness. There is no guarding.         Last Recorded Vitals  Blood pressure 126/58, pulse 95, temperature 36.5 °C (97.7 °F), temperature source Temporal, resp. rate 17, height 1.626 m (5' 4\"), weight 76.2 kg (167 lb 15.9 oz), SpO2 97%.  Intake/Output last 3 Shifts:  I/O last 3 completed shifts:  In: 2876 (36.6 mL/kg) [P.O.:2595; IV Piggyback:281]  Out: 3975 (50.6 mL/kg) [Urine:3975 (1.4 mL/kg/hr)]  Weight: 78.5 kg       Assessment & Plan  Generalized weakness    Primary hypertension    Hyperlipidemia    Hypo-osmolar hyponatremia    TAMMY (acute kidney injury)    Atrial fibrillation (Multi)    UTI (urinary tract infection)    Acute urinary retention    Plan-resolving hypovolemic hyponatremia with acute kidney injury.  No abdominal discomfort.  Add high-protein shakes to diet.  Follow as needed    Leon Garner MD    "

## 2025-06-06 LAB
ANION GAP SERPL CALC-SCNC: 13 MMOL/L (ref 10–20)
BASOPHILS # BLD AUTO: 0.05 X10*3/UL (ref 0–0.1)
BASOPHILS NFR BLD AUTO: 0.3 %
BUN SERPL-MCNC: 35 MG/DL (ref 6–23)
CALCIUM SERPL-MCNC: 8.4 MG/DL (ref 8.6–10.3)
CHLORIDE SERPL-SCNC: 96 MMOL/L (ref 98–107)
CO2 SERPL-SCNC: 22 MMOL/L (ref 21–32)
CREAT SERPL-MCNC: 1.5 MG/DL (ref 0.5–1.05)
EGFRCR SERPLBLD CKD-EPI 2021: 36 ML/MIN/1.73M*2
EOSINOPHIL # BLD AUTO: 0.11 X10*3/UL (ref 0–0.4)
EOSINOPHIL NFR BLD AUTO: 0.7 %
ERYTHROCYTE [DISTWIDTH] IN BLOOD BY AUTOMATED COUNT: 15.1 % (ref 11.5–14.5)
GLUCOSE BLD MANUAL STRIP-MCNC: 102 MG/DL (ref 74–99)
GLUCOSE BLD MANUAL STRIP-MCNC: 110 MG/DL (ref 74–99)
GLUCOSE BLD MANUAL STRIP-MCNC: 112 MG/DL (ref 74–99)
GLUCOSE BLD MANUAL STRIP-MCNC: 95 MG/DL (ref 74–99)
GLUCOSE SERPL-MCNC: 111 MG/DL (ref 74–99)
HCT VFR BLD AUTO: 20.4 % (ref 36–46)
HEMOCCULT SP1 STL QL: NEGATIVE
HGB BLD-MCNC: 7.2 G/DL (ref 12–16)
IMM GRANULOCYTES # BLD AUTO: 0.74 X10*3/UL (ref 0–0.5)
IMM GRANULOCYTES NFR BLD AUTO: 4.9 % (ref 0–0.9)
LYMPHOCYTES # BLD AUTO: 0.98 X10*3/UL (ref 0.8–3)
LYMPHOCYTES NFR BLD AUTO: 6.4 %
MCH RBC QN AUTO: 30.3 PG (ref 26–34)
MCHC RBC AUTO-ENTMCNC: 35.3 G/DL (ref 32–36)
MCV RBC AUTO: 86 FL (ref 80–100)
MONOCYTES # BLD AUTO: 1.06 X10*3/UL (ref 0.05–0.8)
MONOCYTES NFR BLD AUTO: 7 %
NEUTROPHILS # BLD AUTO: 12.31 X10*3/UL (ref 1.6–5.5)
NEUTROPHILS NFR BLD AUTO: 80.7 %
NRBC BLD-RTO: 0 /100 WBCS (ref 0–0)
PLATELET # BLD AUTO: 396 X10*3/UL (ref 150–450)
POTASSIUM SERPL-SCNC: 4.3 MMOL/L (ref 3.5–5.3)
RBC # BLD AUTO: 2.38 X10*6/UL (ref 4–5.2)
SODIUM SERPL-SCNC: 127 MMOL/L (ref 136–145)
WBC # BLD AUTO: 15.3 X10*3/UL (ref 4.4–11.3)

## 2025-06-06 PROCEDURE — 2500000001 HC RX 250 WO HCPCS SELF ADMINISTERED DRUGS (ALT 637 FOR MEDICARE OP): Mod: IPSPLIT | Performed by: INTERNAL MEDICINE

## 2025-06-06 PROCEDURE — 36415 COLL VENOUS BLD VENIPUNCTURE: CPT | Mod: IPSPLIT | Performed by: NURSE PRACTITIONER

## 2025-06-06 PROCEDURE — 82947 ASSAY GLUCOSE BLOOD QUANT: CPT | Mod: IPSPLIT

## 2025-06-06 PROCEDURE — 82746 ASSAY OF FOLIC ACID SERUM: CPT | Mod: GENLAB | Performed by: NURSE PRACTITIONER

## 2025-06-06 PROCEDURE — 80048 BASIC METABOLIC PNL TOTAL CA: CPT | Mod: IPSPLIT | Performed by: NURSE PRACTITIONER

## 2025-06-06 PROCEDURE — 2500000001 HC RX 250 WO HCPCS SELF ADMINISTERED DRUGS (ALT 637 FOR MEDICARE OP): Mod: IPSPLIT | Performed by: HOSPITALIST

## 2025-06-06 PROCEDURE — 85025 COMPLETE CBC W/AUTO DIFF WBC: CPT | Mod: IPSPLIT | Performed by: NURSE PRACTITIONER

## 2025-06-06 PROCEDURE — 1200000002 HC GENERAL ROOM WITH TELEMETRY DAILY: Mod: IPSPLIT

## 2025-06-06 PROCEDURE — 97116 GAIT TRAINING THERAPY: CPT | Mod: GP,CQ,IPSPLIT

## 2025-06-06 PROCEDURE — 94760 N-INVAS EAR/PLS OXIMETRY 1: CPT | Mod: IPSPLIT

## 2025-06-06 PROCEDURE — 82270 OCCULT BLOOD FECES: CPT | Mod: IPSPLIT | Performed by: NURSE PRACTITIONER

## 2025-06-06 PROCEDURE — 99232 SBSQ HOSP IP/OBS MODERATE 35: CPT | Performed by: NURSE PRACTITIONER

## 2025-06-06 PROCEDURE — 2500000004 HC RX 250 GENERAL PHARMACY W/ HCPCS (ALT 636 FOR OP/ED): Mod: IPSPLIT | Performed by: HOSPITALIST

## 2025-06-06 PROCEDURE — 97110 THERAPEUTIC EXERCISES: CPT | Mod: GP,CQ,IPSPLIT

## 2025-06-06 PROCEDURE — 83921 ORGANIC ACID SINGLE QUANT: CPT | Performed by: NURSE PRACTITIONER

## 2025-06-06 RX ORDER — PANTOPRAZOLE SODIUM 40 MG/1
40 TABLET, DELAYED RELEASE ORAL
Status: DISCONTINUED | OUTPATIENT
Start: 2025-06-07 | End: 2025-06-09 | Stop reason: HOSPADM

## 2025-06-06 RX ADMIN — Medication 25 MCG: at 09:10

## 2025-06-06 RX ADMIN — APIXABAN 5 MG: 5 TABLET, FILM COATED ORAL at 20:36

## 2025-06-06 RX ADMIN — OXYCODONE HYDROCHLORIDE AND ACETAMINOPHEN 500 MG: 500 TABLET ORAL at 09:10

## 2025-06-06 RX ADMIN — CALCIUM CARBONATE (ANTACID) CHEW TAB 500 MG 1 TABLET: 500 CHEW TAB at 20:36

## 2025-06-06 RX ADMIN — NYSTATIN 500000 UNITS: 100000 SUSPENSION ORAL at 06:16

## 2025-06-06 RX ADMIN — AMLODIPINE BESYLATE 10 MG: 10 TABLET ORAL at 09:10

## 2025-06-06 RX ADMIN — AMOXICILLIN AND CLAVULANATE POTASSIUM 1 TABLET: 500; 125 TABLET, FILM COATED ORAL at 09:10

## 2025-06-06 RX ADMIN — Medication 1 TABLET: at 09:10

## 2025-06-06 RX ADMIN — AMOXICILLIN AND CLAVULANATE POTASSIUM 1 TABLET: 500; 125 TABLET, FILM COATED ORAL at 20:36

## 2025-06-06 RX ADMIN — APIXABAN 5 MG: 5 TABLET, FILM COATED ORAL at 09:10

## 2025-06-06 RX ADMIN — CALCIUM CARBONATE (ANTACID) CHEW TAB 500 MG 1 TABLET: 500 CHEW TAB at 14:41

## 2025-06-06 RX ADMIN — ACETAMINOPHEN 650 MG: 325 TABLET, FILM COATED ORAL at 18:54

## 2025-06-06 RX ADMIN — NYSTATIN 500000 UNITS: 100000 SUSPENSION ORAL at 17:36

## 2025-06-06 RX ADMIN — SODIUM BICARBONATE 650 MG: 650 TABLET ORAL at 14:41

## 2025-06-06 RX ADMIN — NYSTATIN 500000 UNITS: 100000 SUSPENSION ORAL at 20:36

## 2025-06-06 RX ADMIN — SODIUM BICARBONATE 650 MG: 650 TABLET ORAL at 20:36

## 2025-06-06 RX ADMIN — SENNOSIDES AND DOCUSATE SODIUM 2 TABLET: 50; 8.6 TABLET ORAL at 20:37

## 2025-06-06 RX ADMIN — CALCIUM CARBONATE (ANTACID) CHEW TAB 500 MG 1 TABLET: 500 CHEW TAB at 09:11

## 2025-06-06 RX ADMIN — OXYCODONE 5 MG: 5 TABLET ORAL at 00:16

## 2025-06-06 RX ADMIN — NYSTATIN 500000 UNITS: 100000 SUSPENSION ORAL at 13:54

## 2025-06-06 RX ADMIN — SODIUM BICARBONATE 650 MG: 650 TABLET ORAL at 09:10

## 2025-06-06 RX ADMIN — METOPROLOL SUCCINATE 100 MG: 100 TABLET, EXTENDED RELEASE ORAL at 09:10

## 2025-06-06 ASSESSMENT — COGNITIVE AND FUNCTIONAL STATUS - GENERAL
TURNING FROM BACK TO SIDE WHILE IN FLAT BAD: A LITTLE
MOVING TO AND FROM BED TO CHAIR: A LITTLE
MOVING FROM LYING ON BACK TO SITTING ON SIDE OF FLAT BED WITH BEDRAILS: A LITTLE
WALKING IN HOSPITAL ROOM: A LITTLE
STANDING UP FROM CHAIR USING ARMS: A LITTLE
MOBILITY SCORE: 19

## 2025-06-06 ASSESSMENT — PAIN SCALES - GENERAL
PAINLEVEL_OUTOF10: 0 - NO PAIN
PAINLEVEL_OUTOF10: 4
PAINLEVEL_OUTOF10: 0 - NO PAIN
PAINLEVEL_OUTOF10: 0 - NO PAIN

## 2025-06-06 ASSESSMENT — PAIN - FUNCTIONAL ASSESSMENT
PAIN_FUNCTIONAL_ASSESSMENT: 0-10

## 2025-06-06 ASSESSMENT — PAIN DESCRIPTION - LOCATION: LOCATION: BACK

## 2025-06-06 ASSESSMENT — PAIN DESCRIPTION - DESCRIPTORS: DESCRIPTORS: ACHING

## 2025-06-06 NOTE — CARE PLAN
"The patient's goals for the shift include \"get some sleep\"    The clinical goals for the shift include Patient will remain free of fall/injury this shift      Problem: Pain - Adult  Goal: Verbalizes/displays adequate comfort level or baseline comfort level  Outcome: Progressing     Problem: Safety - Adult  Goal: Free from fall injury  Outcome: Progressing     Problem: Discharge Planning  Goal: Discharge to home or other facility with appropriate resources  Outcome: Progressing     Problem: Chronic Conditions and Co-morbidities  Goal: Patient's chronic conditions and co-morbidity symptoms are monitored and maintained or improved  Outcome: Progressing     Problem: Nutrition  Goal: Nutrient intake appropriate for maintaining nutritional needs  Outcome: Progressing     Problem: Arrythmia/Dysrhythmia  Goal: Lab values return to normal range  Outcome: Progressing     Problem: Fall/Injury  Goal: Not fall by end of shift  Outcome: Progressing  Goal: Be free from injury by end of the shift  Outcome: Progressing  Goal: Verbalize understanding of personal risk factors for fall in the hospital  Outcome: Progressing  Goal: Verbalize understanding of risk factor reduction measures to prevent injury from fall in the home  Outcome: Progressing  Goal: Use assistive devices by end of the shift  Outcome: Progressing  Goal: Pace activities to prevent fatigue by end of the shift  Outcome: Progressing     Problem: Seizure  Goal: I will remain free from seizures  Outcome: Progressing     Problem: Skin  Goal: Decreased wound size/increased tissue granulation at next dressing change  Outcome: Progressing  Goal: Participates in plan/prevention/treatment measures  Outcome: Progressing  Goal: Prevent/manage excess moisture  Outcome: Progressing  Goal: Prevent/minimize sheer/friction injuries  Outcome: Progressing  Goal: Promote/optimize nutrition  Outcome: Progressing  Goal: Promote skin healing  Outcome: Progressing     "

## 2025-06-06 NOTE — PROGRESS NOTES
06/06/25 1213   Discharge Planning   Living Arrangements Alone   Support Systems Children   Assistance Needed Patient lives in the [facility] alone. She has someone cut her grass. Her son or daughter help with things as needed. She is indepednent with ADLs, IADLs, ambulation and drives locally. No DME-no home oxygen. Confirmed address, pharmacy, and PCP is Maken[facility] Esparza.   Type of Residence Private residence   Number of Stairs to Enter Residence 3   Number of Stairs Within Residence 0   Do you have animals or pets at home? No   Who is requesting discharge planning? Provider   Home or Post Acute Services Post acute facilities (Rehab/SNF/etc)   Type of Post Acute Facility Services Skilled nursing   Expected Discharge Disposition SNF  (Patient will go to [facility] SN[facility] when medically ready. Provider would like to see sodium improve a bit more prior to discharging. Facility aware. No barriers when medically ready.)   Does the patient need discharge transport arranged? Yes   RoundTrip coordination needed? Yes   Has discharge transport been arranged? No   Intensity of Service   Intensity of Service 0-30 min     DC Secure when medically ready. [place] <30 days SNF needs completed prior to discharge.

## 2025-06-06 NOTE — CARE PLAN
The clinical goals for the shift include client will have no complaints of N/V this shift and report increased appetite    Over the shift, the patient did not report any nausea and no emesis. Patient has been ambulating to the bathroom and in the [place]ri[place] with staff assist. Patient was in the chair much of the morning and afternoon. Vitals were stable, no reports of pain, appetite was good. Currently sitting in bed with alarm on and call light in reach, family at bedside. Denies any needs at this time.

## 2025-06-06 NOTE — PROGRESS NOTES
Mireya Nava is a 74 y.o. female on day 7 of admission presenting with Generalized weakness.    Subjective   Interval History:   Afebrile, no chills  No chest pain or shortness of breath  No nausea vomiting or diarrhea        Review of Systems   All other systems reviewed and are negative.      Objective   Range of Vitals (last 24 hours)  Heart Rate:  [77-94]   Temp:  [36.4 °C (97.5 °F)-36.8 °C (98.2 °F)]   Resp:  [16-18]   BP: (128-132)/(57-81)   Weight:  [76.6 kg (168 lb 14 oz)]   SpO2:  [90 %-97 %]   Daily Weight  06/06/25 : 76.6 kg (168 lb 14 oz)    Body mass index is 28.99 kg/m².    Physical Exam  Constitutional:       Appearance: Normal appearance.   HENT:      Head: Normocephalic and atraumatic.      Right Ear: External ear normal.      Left Ear: External ear normal.      Nose: Nose normal.   Eyes:      General: No scleral icterus.     Extraocular Movements: Extraocular movements intact.      Conjunctiva/sclera: Conjunctivae normal.   Cardiovascular:      Rate and Rhythm: Normal rate and regular rhythm.   Musculoskeletal:      Cervical back: Normal range of motion and neck supple.   Neurological:      Mental Status: She is alert.        Antibiotics  amoxicillin-clavulanate - 500-125 mg  nystatin - 100,000 unit/mL    Relevant Results  Labs  Results from last 72 hours   Lab Units 06/06/25  0722 06/05/25  0648 06/04/25  0532   WBC AUTO x10*3/uL 15.3* 15.7* 15.6*   HEMOGLOBIN g/dL 7.2* 7.3* 8.0*   HEMATOCRIT % 20.4* 20.1* 22.2*   PLATELETS AUTO x10*3/uL 396 309 261   NEUTROS PCT AUTO % 80.7  --   --    LYMPHS PCT AUTO % 6.4  --   --    MONOS PCT AUTO % 7.0  --   --    EOS PCT AUTO % 0.7  --   --      Results from last 72 hours   Lab Units 06/06/25  0722 06/05/25  1820 06/05/25  0648   SODIUM mmol/L 127* 125* 127*   POTASSIUM mmol/L 4.3 3.8 3.8   CHLORIDE mmol/L 96* 94* 95*   CO2 mmol/L 22 21 20*   BUN mg/dL 35* 42* 45*   CREATININE mg/dL 1.50* 1.74* 1.92*   GLUCOSE mg/dL 111* 148* 109*   CALCIUM mg/dL 8.4*  "8.0* 8.2*   ANION GAP mmol/L 13 14 16   EGFR mL/min/1.73m*2 36* 30* 27*         Estimated Creatinine Clearance: 32 mL/min (A) (by C-G formula based on SCr of 1.5 mg/dL (H)).  No results found for: \"CRP\"  Microbiology  Susceptibility data from last 14 days.  Collected Specimen Info Organism Ampicillin Cefazolin Cefazolin (uncomplicated UTIs only) Ciprofloxacin Gentamicin Levofloxacin Nitrofurantoin Piperacillin/Tazobactam Trimethoprim/Sulfamethoxazole   05/30/25 Urine from Clean Catch/Voided Escherichia coli  S  S  S  S  S  S  S  S  S   05/30/25 Blood culture from Peripheral Venipuncture Escherichia coli            05/30/25 Blood culture from Peripheral Venipuncture Escherichia coli  S  S   S  S  S   S  S       Imaging  ECG 12 lead  Result Date: 6/4/2025  Atrial fibrillation Cannot rule out Anterior infarct , age undetermined Abnormal ECG When compared with ECG of 02-JUN-2025 11:27, (unconfirmed) Atrial fibrillation has replaced Sinus rhythm    XR chest 1 view  Result Date: 6/4/2025  Interpreted By:  Aaron Hanna, STUDY: XR CHEST 1 VIEW;  6/4/2025 10:51 am   INDICATION: Signs/Symptoms:per nuclear request.     COMPARISON: Chest x-ray 06/01/2025   ACCESSION NUMBER(S): [id number]   ORDERING CLINICIAN: ALEXA PROCTOR   FINDINGS: AP radiograph of the chest was provided.       CARDIOMEDIASTINAL SILHOUETTE: Cardiomediastinal silhouette is normal in size and configuration.   LUNGS: Lungs are clear.   ABDOMEN: No remarkable upper abdominal findings.   BONES: No acute osseous changes.       1.  No evidence of acute cardiopulmonary process.       MACRO: None   Signed by: Aaron Hanna 6/4/2025 4:09 PM Dictation workstation:   [id number]    NM Lung perfusion with spect  Result Date: 6/4/2025  Interpreted By:  Floyd Donnelly and Korakavi Nisha STUDY: NM LUNG PERFUSION WITH SPECT;  6/4/2025 9:22 am   INDICATION: Signs/Symptoms:elevated d dimer.     COMPARISON: None.   ACCESSION NUMBER(S): [id number]   ORDERING " CLINICIAN: LUKE YEUNG   TECHNIQUE: DIVISION OF NUCLEAR MEDICINE VENTILATION/PERFUSION LUNG SCANS   Perfusion images of the lungs were then acquired after the intravenous administration of  4.3 mCi of Tc-99m macroaggregated albumin (MAA). Additionally, SPECT images were obtained.   FINDINGS: Perfusion images demonstrate a normal distribution of radiopharmaceutical throughout the lung fields.  There are no segmental or subsegmental perfusion abnormalities.       Normal perfusion lung scan with no evidence of segmental or subsegmental perfusion abnormality.   I personally reviewed the images/study and I agree with the findings as stated by Resident Dr. Vane Renteria MD. This study was interpreted at [place], [place].   MACRO: None   Signed by: Floyd Donnelly 6/4/2025 11:18 AM Dictation workstation:   [id number]    Transthoracic Echo Complete  Result Date: 6/2/2025   [facility], [address] [place], [place] [address]              Tel [phone] and Fax [phone] TRANSTHORACIC ECHOCARDIOGRAM REPORT  Patient Name:       FANNY DARLING   Reading Physician:    [id number] Rosanna Arevalo MD Study Date:         6/2/2025            Ordering Provider:    [id number] ELICEO WONG MRN/PID:            [id number]            Fellow: Accession#:         [id number]        Nurse:                Bev Luna RN Date of Birth/Age:  1950 / 74      Sonographer:          Aria Dixon RDCS                     years Gender assigned at  F                   Additional Staff: Birth: Height:             162.56 cm           Admit Date: Weight:             78.93 kg            Admission Status:     Inpatient -                                                               Routine BSA / BMI:          1.84 m2 / 29.87     Encounter#:           [id number]                      kg/m2 Blood Pressure:     108/61 mmHg         Department Location:  [facility] Study Type:    TRANSTHORACIC ECHO (TTE) COMPLETE Diagnosis/ICD: Unspecified atrial fibrillation-I48.91 Indication:    AFib CPT Code:      Echo Complete w Full [id number] Patient History: Pertinent History: A-Fib, HTN, Hyperlipidemia and Murmur. Weakness. Study Detail: The following Echo studies were performed: 2D, M-Mode, Doppler and               color flow. Agitated saline used as a contrast agent for               intraseptal flow evaluation. The patient was awake.  PHYSICIAN INTERPRETATION: Left Ventricle: Left ventricular ejection fraction is normal by visual estimate at 65-70%. There are no regional left ventricular wall motion abnormalities. The left ventricular cavity size is normal. There is mildly increased septal and mildly increased posterior left ventricular wall thickness. There is left ventricular concentric remodeling. Spectral Doppler shows a normal pattern of left ventricular diastolic filling. Left Atrium: The left atrium is mildly dilated. The patent foramen ovale was visualized using agitated saline contrast. A bubble study using agitated saline was performed. Bubble study is positive. A large PFO (> 20 bubbles) was demonstrated. Right Ventricle: The right ventricle is mildly enlarged. There is normal right ventricular global systolic function. Right Atrium: The right atrium is mildly dilated. Aortic Valve: There is mild aortic valve regurgitation. Mitral Valve: The mitral valve is normal in structure. There is moderate mitral valve regurgitation. The E Vmax is 1.02 m/s. Tricuspid Valve: The tricuspid valve is structurally normal. There is moderate to severe tricuspid regurgitation. The doppler estimated RVSP is within normal limits with a right ventricular systolic pressure of 30 mmHg. Pulmonic Valve: The pulmonic valve is structurally normal. There is physiologic pulmonic valve regurgitation.  Pericardium: There is no pericardial effusion noted. Aorta: The aortic root is normal.  CONCLUSIONS:  1. Left ventricular ejection fraction is normal by visual estimate at 65-70%.  2. There is normal right ventricular global systolic function.  3. Mildly enlarged right ventricle.  4. The left atrium is mildly dilated.  5. Moderate mitral valve regurgitation.  6. Moderate to severe tricuspid regurgitation visualized.  7. The doppler estimated RVSP is within normal limits with a right ventricular systolic pressure of 30 mmHg.  8. Mild aortic valve regurgitation.  9. A bubble study using agitated saline was performed. Bubble study is positive. A large PFO (> 20 bubbles) was demonstrated. QUANTITATIVE DATA SUMMARY:  2D MEASUREMENTS:          Normal Ranges: Ao Root d:       3.20 cm  (2.0-3.7cm) LAs:             3.90 cm  (2.7-4.0cm) IVSd:            1.20 cm  (0.6-1.1cm) LVPWd:           1.19 cm  (0.6-1.1cm) LVIDd:           3.69 cm  (3.9-5.9cm) LVIDs:           2.39 cm LV Mass Index:   79 g/m2 LVEDV Index:     44 ml/m2 LV % FS          35.2 %  LEFT ATRIUM:                  Normal Ranges: LA Vol A4C:        41.6 ml    (22+/-6mL/m2) LA Vol A2C:        69.7 ml LA Vol BP:         61.0 ml LA Vol Index A4C:  22.6ml/m2 LA Vol Index A2C:  37.8 ml/m2 LA Vol Index BP:   33.1 ml/m2 LA Area A4C:       15.0 cm2 LA Area A2C:       22.0 cm2 LA Major Axis A4C: 4.6 cm LA Major Axis A2C: 5.9 cm  RIGHT ATRIUM:                 Normal Ranges: RA Vol A4C:        31.9 ml    (8.3-19.5ml) RA Vol Index A4C:  17.3 ml/m2 RA Area A4C:       15.0 cm2 RA Major Axis A4C: 6.0 cm  AORTA MEASUREMENTS:         Normal Ranges: Asc Ao, d:          3.50 cm (2.1-3.4cm)  LV SYSTOLIC FUNCTION:                      Normal Ranges: EF-A4C View:    71 % (>=55%) EF-A2C View:    71 % EF-Biplane:     72 % EF-Visual:      68 % LV EF Reported: 68 %  LV DIASTOLIC FUNCTION:            Normal Ranges: MV Peak E:             1.02 m/s   (0.7-1.2 m/s) MV e'                  0.151  m/s  (>8.0) MV lateral e'          0.18 m/s MV medial e'           0.13 m/s E/e' Ratio:            6.75       (<8.0) PulmV Sys Danny:         38.10 cm/s PulmV Godfrey Danny:        46.90 cm/s PulmV S/D Danny:         0.80  MITRAL VALVE:          Normal Ranges: MV DT:        213 msec (150-240msec)  AORTIC VALVE:            Normal Ranges: AoV Vmax:      1.80 m/s  (<=1.7m/s) AoV Peak P.0 mmHg (<20mmHg) LVOT Max Danny:  1.12 m/s  (<=1.1m/s) LVOT VTI:      19.80 cm LVOT Diameter: 2.00 cm   (1.8-2.4cm) AoV Area,Vmax: 1.95 cm2  (2.5-4.5cm2)  AORTIC INSUFFICIENCY: AI Vmax:       3.18 m/s AI Half-time:  615 msec AI Decel Rate: 152.00 cm/s2  RIGHT VENTRICLE: RV Basal 4.70 cm RV Mid   3.90 cm RV Major 7.9 cm TAPSE:   24.4 mm RV s'    0.13 m/s  TRICUSPID VALVE/RVSP:          Normal Ranges: Peak TR Velocity:     2.58 m/s Est. RA Pressure:     3 RV Syst Pressure:     30       (< 30mmHg) IVC Diam:             1.48 cm  PULMONIC VALVE:          Normal Ranges: PV Max Danny:     1.3 m/s  (0.6-0.9m/s) PV Max P.4 mmHg  PULMONARY VEINS: PulmV Godfrey Danny: 46.90 cm/s PulmV S/D Danny:  0.80 PulmV Sys Danny:  38.10 cm/s  [id number] Rosanna Arevalo MD Electronically signed on 2025 at 9:27:59 PM  ** Final **     XR abdomen 1 view  Result Date: 2025  Interpreted By:  Aaron Hanna, STUDY: XR ABDOMEN 1 VIEW;  2025 4:19 pm   INDICATION: Signs/Symptoms:n/v.     COMPARISON: CT chest abdomen and pelvis 2020   ACCESSION NUMBER(S): [id number]   ORDERING CLINICIAN: LUKE ANJANA   FINDINGS: Nonobstructive bowel gas pattern. Limited evaluation of pneumoperitoneum on supine imaging, however no gross evidence of free air is noted.   Visualized lungs are clear.   Osseous structures demonstrate no acute bony changes.       1.  Unremarkable exam.   MACRO: None   Signed by: Aaron Hanna 2025 4:30 PM Dictation workstation:   [id number]    Electrocardiogram, 12-lead PRN ACS symptoms  Result Date: 2025  Sinus rhythm with  Premature atrial complexes Otherwise normal ECG When compared with ECG of 30-MAY-2025 11:11, (unconfirmed) Sinus rhythm has replaced Atrial fibrillation    ECG 12 lead  Result Date: 6/2/2025  Atrial fibrillation Abnormal ECG When compared with ECG of 27-DEC-2005 11:33, Atrial fibrillation has replaced Sinus rhythm Vent. rate has increased BY  31 BPM Nonspecific T wave abnormality now evident in Anterior leads    XR chest 1 view  Result Date: 6/1/2025  Interpreted By:  Myrtle Duque, STUDY: XR CHEST 1 VIEW;  6/1/2025 1:37 am   INDICATION: Signs/Symptoms:change in breathing     COMPARISON: CT chest, abdomen, pelvis 05/30/2025.   ACCESSION NUMBER(S): [id number]   ORDERING CLINICIAN: ALEXA PROCTOR   TECHNIQUE: Portable upright frontal view of the chest was obtained .   FINDINGS: Monitoring leads are overlying the patient.   The heart is enlarged. There is mild interstitial edema.   There is a small left pleural effusion with airspace opacity at the left lung base. No pneumothorax.       1.  Cardiomegaly. Mild interstitial edema. Small left pleural effusion with airspace opacity at the left lung base, may be secondary to atelectasis or pneumonia.       MACRO: None.   Signed by: Myrtle Duque 6/1/2025 3:24 AM Dictation workstation:   [id number]    CT chest abdomen pelvis wo IV contrast  Result Date: 5/30/2025  Interpreted By:  Shanel Valencia, STUDY: CT CHEST ABDOMEN PELVIS WO CONTRAST;  5/30/2025 12:50 pm   INDICATION: Signs/Symptoms:cough, abd pain.   COMPARISON: None.   ACCESSION NUMBER(S): [id number]   ORDERING CLINICIAN: DARA WELCH   TECHNIQUE: CT of the chest, abdomen, and pelvis was performed without intravenous contrast.   FINDINGS:   CHEST:   Lines/Devices: None   Lungs: The trachea and central airways are patent without endobronchial lesions. There is a small left pleural effusion with adjacent compressive atelectasis. No focal consolidation, pulmonary edema, pneumothorax or suspicious nodule.    Vasculature: Mild dilation of the thoracic aorta in the ascending segment measuring 4 cm. The main pulmonary artery is normal in size. Mild coronary artery calcifications noted in the LAD.   Heart: Heart size is normal. No pericardial effusion.   Mediastinum and lyudmila: No pathologically enlarged thoracic lymphadenopathy. The esophagus is unremarkable.   Chest wall and lower neck: No significant chest wall soft tissue abnormalities. The visualized thyroid is normal.   ABDOMEN/PELVIS:   Liver: No mass. Hepatomegaly measuring 20 cm in the craniocaudal dimension.   Biliary: No intrahepatic or extrahepatic bile duct dilation. The gallbladder is collapsed and limited for evaluation.   Spleen: No mass. No splenomegaly.   Pancreas: No mass, main duct dilation or acute inflammation.   Adrenals: Normal.   Kidneys: No calculus or hydronephrosis.   GI tract: There is a small amount of free fluid and fat stranding centered around the cecum. The appendix is not identified. No pericecal free air or organized fluid collection. There are multiple loops of fluid-filled, nondilated small bowel in the pelvis. No bowel obstruction or bowel wall thickening otherwise.   Lymph nodes: No abdominopelvic lymphadenopathy.   Mesentery/peritoneum: No free air or fluid collection. There is mild generalized haziness involving the peritoneum.   Vasculature: Moderate abdominal aortic atherosclerotic calcifications without aneurysmal dilation.   Pelvis: No free air or fluid collection. Trace free fluid in the dependent pelvis. The bladder is moderate distended but otherwise normal-appearing. The uterus appears grossly unremarkable.   Bones/Soft tissues: Mild levocurvature of the lumbar spine with multilevel thoracolumbar degenerative disc disease. Moderate to severe bilateral hip osteoarthritis. Mild abdominal wall edema.         Limited examination without intravenous contrast.   The appendix is not visualized, although right lower quadrant  inflammation and free fluid centered around the cecum raise concern for possible acute appendicitis. No organized fluid collection. Please correlate with right lower quadrant tenderness.   Multiple fluid-filled small bowel within the pelvis could represent nonspecific enteritis. No bowel obstruction.   Small left pleural effusion without definite evidence of pneumonia.   4 cm ascending thoracic aortic aneurysm.   DAYTON Valencia discussed the significance and urgency of this critical finding by Epic secure chat with  DARA WELCH on 5/30/2025 at 2:03 pm.  (**-RCF-**) Findings:  See findings.   Signed by: Shanel Valencia 5/30/2025 2:05 PM Dictation workstation:   [id number]        Assessment/Plan   Sepsis secondary to E. coli bacteremia  E. coli bacteremic, possibly secondary to urinary tract infection  E. coli urinary tract infection  Acute kidney injury, resolving  Leukocytosis, multifactorial        Augmentin-adjust dose based on renal function  Monitor renal function  Supportive care  Monitor temperature and WBC  Long-term plan is total of 14 days of antibiotic therapy  Plan discussed with primary team     This is a complex infectious disease issue and the following was performed today (for more details please see the above note): Management decisions reflecting the added complexity (e.g., changes in antimicrobial therapy, infection control strategies).     Nemesio Hansen MD

## 2025-06-06 NOTE — PROGRESS NOTES
Physical Therapy    Physical Therapy Treatment    Patient Name: Mireya Nava  MRN: [id number]  Department: [facility]  Room: [address] [place] [address]  Today's Date: 6/6/2025  Time Calculation  Start Time: 0915  Stop Time: 0949  Time Calculation (min): 34 min    Assessment/Plan   PT Assessment  PT Assessment Results: Decreased strength, Impaired balance, Decreased mobility, Decreased endurance  Rehab Prognosis: Good  Barriers to Discharge Home: Caregiver assistance, Physical needs  Caregiver Assistance: Patient lives alone and/or does not have reliable caregiver assistance  Physical Needs: 24hr mobility assistance needed  Evaluation/Treatment Tolerance: Patient tolerated treatment well  Medical Staff Made Aware: Yes  Strengths: Premorbid level of function  Barriers to Participation:  (n/a)  End of Session Communication: Bedside nurse  Assessment Comment: Pt demonstrated improvement with endurance during session by being able to amb 2 x 80' using FWW with CGA; pt did require extended seated rest breaks after each set due to decrease in SpO2 during ambulation. Pt was able to perform 2 x 5 STS and tolerated fairly well. Pt would continue to benefit from skilled therapy to improve strength and endurance and prevent further decline.  End of Session Patient Position: Up in chair, Alarm on     PT Plan  Treatment/Interventions: Transfer training, Gait training, Therapeutic exercise  PT Plan: Ongoing PT  PT Frequency: 3 times per week  PT Discharge Recommendations: Moderate intensity level of continued care  Equipment Recommended upon Discharge: Wheeled walker  PT Recommended Transfer Status: Stand by assist  PT - OK to Discharge: Yes    PT Visit Info:  PT Received On: 06/06/25     General Visit Information:   General  Reason for Referral: impaired mobility, generalized weakness  Referred By: Jung Esparza DO  Past Medical History Relevant to Rehab: HTN, anemia, detached retina s/p repair  Family/Caregiver Present: No  Prior to Session  Communication: Bedside nurse  Patient Position Received: Up in chair, Alarm on  Preferred Learning Style: verbal, written  General Comment: Pt pleasant and agreeable to therapy session. Pt cleared by RN prior to session.    Subjective   Precautions:  Precautions  Medical Precautions: Fall precautions  Precautions Comment: angelita olsen     Date/Time Vitals Session Patient Position Pulse Resp SpO2 BP MAP (mmHg)    06/06/25 1250 --  --  79  18  90 %  128/71  90           Vital Signs Comment: SpO2 was monitored throughout session on RA; decreased to 87-88% while ambulating and increased to 95-97% after extended seated rest breaks.     Objective   Pain:  Pain Assessment  Pain Assessment: 0-10  0-10 (Numeric) Pain Score: 0 - No pain  Cognition:  Cognition  Overall Cognitive Status: Within Functional Limits  Arousal/Alertness: Appropriate responses to stimuli  Orientation Level: Oriented X4  Coordination:  Movements are Fluid and Coordinated: Yes    Treatments:  Therapeutic Exercise  Therapeutic Exercise Performed: Yes  Therapeutic Exercise Activity 1: 2 x 5 STS (Pt was able to perform 3/5 STS without UE support during first set and 1/5 during second step.)    Ambulation/Gait Training  Ambulation/Gait Training Performed: Yes  Ambulation/Gait Training 1  Surface 1: Level tile  Device 1: Rolling walker  Gait Support Devices: Gait belt  Assistance 1: Contact guard  Quality of Gait 1: Diminished heel strike, Inconsistent stride length, Decreased step length, Shuffling gait, Soft knee(s)  Comments/Distance (ft) 1: 2 x 80'; pt required extended seated rest breaks after each set due to fatigue and decrease in SpO2 while ambulating.    Transfers  Transfer: Yes  Transfer 1  Transfer From 1: Chair with arms to  Transfer to 1: Stand  Technique 1: Sit to stand, Stand to sit  Transfer Device 1: Walker, Gait belt  Transfer Level of Assistance 1: Close supervision  Trials/Comments 1: multiple trials throughout session    Outcome  Measures:  [facility] Basic Mobility  Turning from your back to your side while in a flat bed without using bedrails: A little  Moving from lying on your back to sitting on the side of a flat bed without using bedrails: A little  Moving to and from bed to chair (including a wheelchair): A little  Standing up from a chair using your arms (e.g. wheelchair or bedside chair): A little  To walk in hospital room: A little  Climbing 3-5 steps with railing: None  Basic Mobility - Total Score: 19    Education Documentation  Mobility Training, taught by Rhonda Walker PTA at 6/6/2025 12:57 PM.  Learner: Patient  Readiness: Acceptance  Method: Explanation  Response: Verbalizes Understanding  Comment: posture while ambulating, breathing technique    Encounter Problems       Encounter Problems (Active)       Balance       STG - Maintains dynamic standing balance without upper extremity support with normal balance  (Progressing)       Start:  06/02/25    Expected End:  06/16/25               Mobility       LTG - Patient will ambulate community distance DEMAR with LRD  (Progressing)       Start:  06/02/25    Expected End:  06/16/25            LTG - Patient will navigate 3 steps with 2 rails/device IND (Progressing)       Start:  06/02/25    Expected End:  06/16/25               PT Transfers       STG - Patient will perform bed mobility IND (Progressing)       Start:  06/02/25    Expected End:  06/16/25            STG - Patient will transfer sit to and from stand DEMAR with LRD  (Progressing)       Start:  06/02/25    Expected End:  06/16/25            Pt. will complete 5 STS without UE support in <15 seconds  (Progressing)       Start:  06/02/25    Expected End:  06/16/25               Pain - Adult

## 2025-06-06 NOTE — PROGRESS NOTES
"Mireya Nava is a 74 y.o. female on day 7 of admission presenting with Generalized weakness.      Subjective   \"Everything is flowing - my kids are so mad at me for waiting so long\"   Denies constipation, n/v - admits difficulty with eating due to thrush        Objective     Last Recorded Vitals  /70 (BP Location: Right arm)   Pulse 94   Temp 36.4 °C (97.5 °F) (Temporal)   Resp 16   Wt 76.6 kg (168 lb 14 oz)   SpO2 96%   Intake/Output last 3 Shifts:    Intake/Output Summary (Last 24 hours) at 6/6/2025 1128  Last data filed at 6/6/2025 0912  Gross per 24 hour   Intake 240 ml   Output 900 ml   Net -660 ml       Admission Weight  Weight: 65.8 kg (145 lb) (05/30/25 1036)    Daily Weight  06/06/25 : 76.6 kg (168 lb 14 oz)    Image Results  ECG 12 lead  Atrial fibrillation  Cannot rule out Anterior infarct , age undetermined  Abnormal ECG  When compared with ECG of 02-JUN-2025 11:27, (unconfirmed)  Atrial fibrillation has replaced Sinus rhythm  XR chest 1 view  Narrative: Interpreted By:  Aaron Hanna,   STUDY:  XR CHEST 1 VIEW;  6/4/2025 10:51 am      INDICATION:  Signs/Symptoms:per nuclear request.          COMPARISON:  Chest x-ray 06/01/2025      ACCESSION NUMBER(S):  [id number]      ORDERING CLINICIAN:  ALEXA PROCTOR      FINDINGS:  AP radiograph of the chest was provided.              CARDIOMEDIASTINAL SILHOUETTE:  Cardiomediastinal silhouette is normal in size and configuration.      LUNGS:  Lungs are clear.      ABDOMEN:  No remarkable upper abdominal findings.      BONES:  No acute osseous changes.      Impression: 1.  No evidence of acute cardiopulmonary process.              MACRO:  None      Signed by: Aaron Hanna 6/4/2025 4:09 PM  Dictation workstation:   [id number]  NM Lung perfusion with spect  Narrative: Interpreted By:  Floyd Donnelly and Korakavi Nisha   STUDY:  NM LUNG PERFUSION WITH SPECT;  6/4/2025 9:22 am      INDICATION:  Signs/Symptoms:elevated d dimer.        "   COMPARISON:  None.      ACCESSION NUMBER(S):  [id number]      ORDERING CLINICIAN:  LUKE YEUNG      TECHNIQUE:  DIVISION OF NUCLEAR MEDICINE  VENTILATION/PERFUSION LUNG SCANS      Perfusion images of the lungs were then acquired after the  intravenous administration of  4.3 mCi of Tc-99m macroaggregated  albumin (MAA). Additionally, SPECT images were obtained.      FINDINGS:  Perfusion images demonstrate a normal distribution of  radiopharmaceutical throughout the lung fields.  There are no  segmental or subsegmental perfusion abnormalities.      Impression: Normal perfusion lung scan with no evidence of segmental or  subsegmental perfusion abnormality.      I personally reviewed the images/study and I agree with the findings  as stated by Resident Dr. Vane Renteria MD. This study was  interpreted at [place], [place].      MACRO:  None      Signed by: Floyd Donnelly 6/4/2025 11:18 AM  Dictation workstation:   [id number]      Physical Exam  Constitutional:       Appearance: Normal appearance.   HENT:      Head: Normocephalic.      Nose: Nose normal.      Mouth/Throat:      Mouth: Mucous membranes are moist.   Eyes:      Extraocular Movements: Extraocular movements intact.   Cardiovascular:      Rate and Rhythm: Normal rate. Rhythm irregular.      Pulses: Normal pulses.      Heart sounds: Normal heart sounds.   Pulmonary:      Effort: Pulmonary effort is normal.      Breath sounds: Normal breath sounds.   Abdominal:      General: Bowel sounds are normal.      Palpations: Abdomen is soft.   Musculoskeletal:         General: Normal range of motion.      Cervical back: Normal range of motion.   Skin:     General: Skin is warm and dry.      Capillary Refill: Capillary refill takes less than 2 seconds.      Comments: Oral thrush present     Neurological:      General: No focal deficit present.      Mental Status: She is alert and oriented to person, place, and time.    Psychiatric:         Mood and Affect: Mood normal.         Behavior: Behavior normal.         Relevant Results             Scheduled medications  Scheduled Medications[1]  Continuous medications  Continuous Medications[2]  PRN medications  PRN Medications[3]  Results for orders placed or performed during the hospital encounter of 05/30/25 (from the past 24 hours)   POCT GLUCOSE   Result Value Ref Range    POCT Glucose 118 (H) 74 - 99 mg/dL   POCT GLUCOSE   Result Value Ref Range    POCT Glucose 133 (H) 74 - 99 mg/dL   Basic metabolic panel   Result Value Ref Range    Glucose 148 (H) 74 - 99 mg/dL    Sodium 125 (L) 136 - 145 mmol/L    Potassium 3.8 3.5 - 5.3 mmol/L    Chloride 94 (L) 98 - 107 mmol/L    Bicarbonate 21 21 - 32 mmol/L    Anion Gap 14 10 - 20 mmol/L    Urea Nitrogen 42 (H) 6 - 23 mg/dL    Creatinine 1.74 (H) 0.50 - 1.05 mg/dL    eGFR 30 (L) >60 mL/min/1.73m*2    Calcium 8.0 (L) 8.6 - 10.3 mg/dL   POCT GLUCOSE   Result Value Ref Range    POCT Glucose 121 (H) 74 - 99 mg/dL   POCT GLUCOSE   Result Value Ref Range    POCT Glucose 110 (H) 74 - 99 mg/dL   Basic Metabolic Panel   Result Value Ref Range    Glucose 111 (H) 74 - 99 mg/dL    Sodium 127 (L) 136 - 145 mmol/L    Potassium 4.3 3.5 - 5.3 mmol/L    Chloride 96 (L) 98 - 107 mmol/L    Bicarbonate 22 21 - 32 mmol/L    Anion Gap 13 10 - 20 mmol/L    Urea Nitrogen 35 (H) 6 - 23 mg/dL    Creatinine 1.50 (H) 0.50 - 1.05 mg/dL    eGFR 36 (L) >60 mL/min/1.73m*2    Calcium 8.4 (L) 8.6 - 10.3 mg/dL   CBC and Auto Differential   Result Value Ref Range    WBC 15.3 (H) 4.4 - 11.3 x10*3/uL    nRBC 0.0 0.0 - 0.0 /100 WBCs    RBC 2.38 (L) 4.00 - 5.20 x10*6/uL    Hemoglobin 7.2 (L) 12.0 - 16.0 g/dL    Hematocrit 20.4 (L) 36.0 - 46.0 %    MCV 86 80 - 100 fL    MCH 30.3 26.0 - 34.0 pg    MCHC 35.3 32.0 - 36.0 g/dL    RDW 15.1 (H) 11.5 - 14.5 %    Platelets 396 150 - 450 x10*3/uL    Neutrophils % 80.7 40.0 - 80.0 %    Immature Granulocytes %, Automated 4.9 (H) 0.0 - 0.9 %     Lymphocytes % 6.4 13.0 - 44.0 %    Monocytes % 7.0 2.0 - 10.0 %    Eosinophils % 0.7 0.0 - 6.0 %    Basophils % 0.3 0.0 - 2.0 %    Neutrophils Absolute 12.31 (H) 1.60 - 5.50 x10*3/uL    Immature Granulocytes Absolute, Automated 0.74 (H) 0.00 - 0.50 x10*3/uL    Lymphocytes Absolute 0.98 0.80 - 3.00 x10*3/uL    Monocytes Absolute 1.06 (H) 0.05 - 0.80 x10*3/uL    Eosinophils Absolute 0.11 0.00 - 0.40 x10*3/uL    Basophils Absolute 0.05 0.00 - 0.10 x10*3/uL   POCT GLUCOSE   Result Value Ref Range    POCT Glucose 102 (H) 74 - 99 mg/dL     ECG 12 lead  Result Date: 6/4/2025  Atrial fibrillation Cannot rule out Anterior infarct , age undetermined Abnormal ECG When compared with ECG of 02-JUN-2025 11:27, (unconfirmed) Atrial fibrillation has replaced Sinus rhythm    XR chest 1 view  Result Date: 6/4/2025  Interpreted By:  Aaron Hanna, STUDY: XR CHEST 1 VIEW;  6/4/2025 10:51 am   INDICATION: Signs/Symptoms:per nuclear request.     COMPARISON: Chest x-ray 06/01/2025   ACCESSION NUMBER(S): [id number]   ORDERING CLINICIAN: ALEXA PROCTOR   FINDINGS: AP radiograph of the chest was provided.       CARDIOMEDIASTINAL SILHOUETTE: Cardiomediastinal silhouette is normal in size and configuration.   LUNGS: Lungs are clear.   ABDOMEN: No remarkable upper abdominal findings.   BONES: No acute osseous changes.       1.  No evidence of acute cardiopulmonary process.       MACRO: None   Signed by: Aaron Hanna 6/4/2025 4:09 PM Dictation workstation:   [id number]    NM Lung perfusion with spect  Result Date: 6/4/2025  Interpreted By:  Floyd Donnelly and Korakavi Nisha STUDY: NM LUNG PERFUSION WITH SPECT;  6/4/2025 9:22 am   INDICATION: Signs/Symptoms:elevated d dimer.     COMPARISON: None.   ACCESSION NUMBER(S): [id number]   ORDERING CLINICIAN: LUKE YEUNG   TECHNIQUE: DIVISION OF NUCLEAR MEDICINE VENTILATION/PERFUSION LUNG SCANS   Perfusion images of the lungs were then acquired after the intravenous  administration of  4.3 mCi of Tc-99m macroaggregated albumin (MAA). Additionally, SPECT images were obtained.   FINDINGS: Perfusion images demonstrate a normal distribution of radiopharmaceutical throughout the lung fields.  There are no segmental or subsegmental perfusion abnormalities.       Normal perfusion lung scan with no evidence of segmental or subsegmental perfusion abnormality.   I personally reviewed the images/study and I agree with the findings as stated by Resident Dr. Vane Renteria MD. This study was interpreted at [place], [place].   MACRO: None   Signed by: Floyd Donnelly 6/4/2025 11:18 AM Dictation workstation:   [id number]    Transthoracic Echo Complete  Result Date: 6/2/2025   [facility], [address] [place] [address]              Tel [phone] and Fax [phone] TRANSTHORACIC ECHOCARDIOGRAM REPORT  Patient Name:       FANNY TIJERINA PHONG   Reading Physician:    [id number] Rosanna Arevalo MD Study Date:         6/2/2025            Ordering Provider:    [id number] ELICEO WONG MRN/PID:            [id number]            Fellow: Accession#:         [id number]        Nurse:                Bev Luna RN Date of Birth/Age:  1950 / 74      Sonographer:          Aria Dixon RDCS                     years Gender assigned at  F                   Additional Staff: Birth: Height:             162.56 cm           Admit Date: Weight:             78.93 kg            Admission Status:     Inpatient -                                                               Routine BSA / BMI:          1.84 m2 / 29.87     Encounter#:           [id number]                     kg/m2 Blood Pressure:     108/61 mmHg         Department Location:  [facility] Study Type:    TRANSTHORACIC ECHO (TTE) COMPLETE Diagnosis/ICD: Unspecified  atrial fibrillation-I48.91 Indication:    AFib CPT Code:      Echo Complete w Full Doppler-[id number] Patient History: Pertinent History: A-Fib, HTN, Hyperlipidemia and Murmur. Weakness. Study Detail: The following Echo studies were performed: 2D, M-Mode, Doppler and               color flow. Agitated saline used as a contrast agent for               intraseptal flow evaluation. The patient was awake.  PHYSICIAN INTERPRETATION: Left Ventricle: Left ventricular ejection fraction is normal by visual estimate at 65-70%. There are no regional left ventricular wall motion abnormalities. The left ventricular cavity size is normal. There is mildly increased septal and mildly increased posterior left ventricular wall thickness. There is left ventricular concentric remodeling. Spectral Doppler shows a normal pattern of left ventricular diastolic filling. Left Atrium: The left atrium is mildly dilated. The patent foramen ovale was visualized using agitated saline contrast. A bubble study using agitated saline was performed. Bubble study is positive. A large PFO (> 20 bubbles) was demonstrated. Right Ventricle: The right ventricle is mildly enlarged. There is normal right ventricular global systolic function. Right Atrium: The right atrium is mildly dilated. Aortic Valve: There is mild aortic valve regurgitation. Mitral Valve: The mitral valve is normal in structure. There is moderate mitral valve regurgitation. The E Vmax is 1.02 m/s. Tricuspid Valve: The tricuspid valve is structurally normal. There is moderate to severe tricuspid regurgitation. The doppler estimated RVSP is within normal limits with a right ventricular systolic pressure of 30 mmHg. Pulmonic Valve: The pulmonic valve is structurally normal. There is physiologic pulmonic valve regurgitation. Pericardium: There is no pericardial effusion noted. Aorta: The aortic root is normal.  CONCLUSIONS:  1. Left ventricular ejection fraction is normal by visual estimate at  65-70%.  2. There is normal right ventricular global systolic function.  3. Mildly enlarged right ventricle.  4. The left atrium is mildly dilated.  5. Moderate mitral valve regurgitation.  6. Moderate to severe tricuspid regurgitation visualized.  7. The doppler estimated RVSP is within normal limits with a right ventricular systolic pressure of 30 mmHg.  8. Mild aortic valve regurgitation.  9. A bubble study using agitated saline was performed. Bubble study is positive. A large PFO (> 20 bubbles) was demonstrated. QUANTITATIVE DATA SUMMARY:  2D MEASUREMENTS:          Normal Ranges: Ao Root d:       3.20 cm  (2.0-3.7cm) LAs:             3.90 cm  (2.7-4.0cm) IVSd:            1.20 cm  (0.6-1.1cm) LVPWd:           1.19 cm  (0.6-1.1cm) LVIDd:           3.69 cm  (3.9-5.9cm) LVIDs:           2.39 cm LV Mass Index:   79 g/m2 LVEDV Index:     44 ml/m2 LV % FS          35.2 %  LEFT ATRIUM:                  Normal Ranges: LA Vol A4C:        41.6 ml    (22+/-6mL/m2) LA Vol A2C:        69.7 ml LA Vol BP:         61.0 ml LA Vol Index A4C:  22.6ml/m2 LA Vol Index A2C:  37.8 ml/m2 LA Vol Index BP:   33.1 ml/m2 LA Area A4C:       15.0 cm2 LA Area A2C:       22.0 cm2 LA Major Axis A4C: 4.6 cm LA Major Axis A2C: 5.9 cm  RIGHT ATRIUM:                 Normal Ranges: RA Vol A4C:        31.9 ml    (8.3-19.5ml) RA Vol Index A4C:  17.3 ml/m2 RA Area A4C:       15.0 cm2 RA Major Axis A4C: 6.0 cm  AORTA MEASUREMENTS:         Normal Ranges: Asc Ao, d:          3.50 cm (2.1-3.4cm)  LV SYSTOLIC FUNCTION:                      Normal Ranges: EF-A4C View:    71 % (>=55%) EF-A2C View:    71 % EF-Biplane:     72 % EF-Visual:      68 % LV EF Reported: 68 %  LV DIASTOLIC FUNCTION:            Normal Ranges: MV Peak E:             1.02 m/s   (0.7-1.2 m/s) MV e'                  0.151 m/s  (>8.0) MV lateral e'          0.18 m/s MV medial e'           0.13 m/s E/e' Ratio:            6.75       (<8.0) PulmV Sys Danny:         38.10 cm/s PulmV Godfrey Danny:         46.90 cm/s PulmV S/D Danny:         0.80  MITRAL VALVE:          Normal Ranges: MV DT:        213 msec (150-240msec)  AORTIC VALVE:            Normal Ranges: AoV Vmax:      1.80 m/s  (<=1.7m/s) AoV Peak P.0 mmHg (<20mmHg) LVOT Max Danny:  1.12 m/s  (<=1.1m/s) LVOT VTI:      19.80 cm LVOT Diameter: 2.00 cm   (1.8-2.4cm) AoV Area,Vmax: 1.95 cm2  (2.5-4.5cm2)  AORTIC INSUFFICIENCY: AI Vmax:       3.18 m/s AI Half-time:  615 msec AI Decel Rate: 152.00 cm/s2  RIGHT VENTRICLE: RV Basal 4.70 cm RV Mid   3.90 cm RV Major 7.9 cm TAPSE:   24.4 mm RV s'    0.13 m/s  TRICUSPID VALVE/RVSP:          Normal Ranges: Peak TR Velocity:     2.58 m/s Est. RA Pressure:     3 RV Syst Pressure:     30       (< 30mmHg) IVC Diam:             1.48 cm  PULMONIC VALVE:          Normal Ranges: PV Max Danny:     1.3 m/s  (0.6-0.9m/s) PV Max P.4 mmHg  PULMONARY VEINS: PulmV Godfrey Danny: 46.90 cm/s PulmV S/D Danny:  0.80 PulmV Sys Danny:  38.10 cm/s  [id number] Rosanna Arevalo MD Electronically signed on 2025 at 9:27:59 PM  ** Final **     XR abdomen 1 view  Result Date: 2025  Interpreted By:  Aaron Hanna, STUDY: XR ABDOMEN 1 VIEW;  2025 4:19 pm   INDICATION: Signs/Symptoms:n/v.     COMPARISON: CT chest abdomen and pelvis 2020   ACCESSION NUMBER(S): [id number]   ORDERING CLINICIAN: LUKE YEUNG   FINDINGS: Nonobstructive bowel gas pattern. Limited evaluation of pneumoperitoneum on supine imaging, however no gross evidence of free air is noted.   Visualized lungs are clear.   Osseous structures demonstrate no acute bony changes.       1.  Unremarkable exam.   MACRO: None   Signed by: Aaron Hanna 2025 4:30 PM Dictation workstation:   [id number]    Electrocardiogram, 12-lead PRN ACS symptoms  Result Date: 2025  Sinus rhythm with Premature atrial complexes Otherwise normal ECG When compared with ECG of 30-MAY-2025 11:11, (unconfirmed) Sinus rhythm has replaced Atrial fibrillation    ECG 12  lead  Result Date: 6/2/2025  Atrial fibrillation Abnormal ECG When compared with ECG of 27-DEC-2005 11:33, Atrial fibrillation has replaced Sinus rhythm Vent. rate has increased BY  31 BPM Nonspecific T wave abnormality now evident in Anterior leads    XR chest 1 view  Result Date: 6/1/2025  Interpreted By:  Myrtle Duque, STUDY: XR CHEST 1 VIEW;  6/1/2025 1:37 am   INDICATION: Signs/Symptoms:change in breathing     COMPARISON: CT chest, abdomen, pelvis 05/30/2025.   ACCESSION NUMBER(S): [id number]   ORDERING CLINICIAN: ALXEA PROCTOR   TECHNIQUE: Portable upright frontal view of the chest was obtained .   FINDINGS: Monitoring leads are overlying the patient.   The heart is enlarged. There is mild interstitial edema.   There is a small left pleural effusion with airspace opacity at the left lung base. No pneumothorax.       1.  Cardiomegaly. Mild interstitial edema. Small left pleural effusion with airspace opacity at the left lung base, may be secondary to atelectasis or pneumonia.       MACRO: None.   Signed by: Myrtle Duque 6/1/2025 3:24 AM Dictation workstation:   [id number]    CT chest abdomen pelvis wo IV contrast  Result Date: 5/30/2025  Interpreted By:  Shanel Valencia, STUDY: CT CHEST ABDOMEN PELVIS WO CONTRAST;  5/30/2025 12:50 pm   INDICATION: Signs/Symptoms:cough, abd pain.   COMPARISON: None.   ACCESSION NUMBER(S): [id number]   ORDERING CLINICIAN: DARA WELCH   TECHNIQUE: CT of the chest, abdomen, and pelvis was performed without intravenous contrast.   FINDINGS:   CHEST:   Lines/Devices: None   Lungs: The trachea and central airways are patent without endobronchial lesions. There is a small left pleural effusion with adjacent compressive atelectasis. No focal consolidation, pulmonary edema, pneumothorax or suspicious nodule.   Vasculature: Mild dilation of the thoracic aorta in the ascending segment measuring 4 cm. The main pulmonary artery is normal in size. Mild coronary artery  calcifications noted in the LAD.   Heart: Heart size is normal. No pericardial effusion.   Mediastinum and lyudmila: No pathologically enlarged thoracic lymphadenopathy. The esophagus is unremarkable.   Chest wall and lower neck: No significant chest wall soft tissue abnormalities. The visualized thyroid is normal.   ABDOMEN/PELVIS:   Liver: No mass. Hepatomegaly measuring 20 cm in the craniocaudal dimension.   Biliary: No intrahepatic or extrahepatic bile duct dilation. The gallbladder is collapsed and limited for evaluation.   Spleen: No mass. No splenomegaly.   Pancreas: No mass, main duct dilation or acute inflammation.   Adrenals: Normal.   Kidneys: No calculus or hydronephrosis.   GI tract: There is a small amount of free fluid and fat stranding centered around the cecum. The appendix is not identified. No pericecal free air or organized fluid collection. There are multiple loops of fluid-filled, nondilated small bowel in the pelvis. No bowel obstruction or bowel wall thickening otherwise.   Lymph nodes: No abdominopelvic lymphadenopathy.   Mesentery/peritoneum: No free air or fluid collection. There is mild generalized haziness involving the peritoneum.   Vasculature: Moderate abdominal aortic atherosclerotic calcifications without aneurysmal dilation.   Pelvis: No free air or fluid collection. Trace free fluid in the dependent pelvis. The bladder is moderate distended but otherwise normal-appearing. The uterus appears grossly unremarkable.   Bones/Soft tissues: Mild levocurvature of the lumbar spine with multilevel thoracolumbar degenerative disc disease. Moderate to severe bilateral hip osteoarthritis. Mild abdominal wall edema.         Limited examination without intravenous contrast.   The appendix is not visualized, although right lower quadrant inflammation and free fluid centered around the cecum raise concern for possible acute appendicitis. No organized fluid collection. Please correlate with right lower  quadrant tenderness.   Multiple fluid-filled small bowel within the pelvis could represent nonspecific enteritis. No bowel obstruction.   Small left pleural effusion without definite evidence of pneumonia.   4 cm ascending thoracic aortic aneurysm.   MACRO Shanel Valencia discussed the significance and urgency of this critical finding by Epic secure chat with  DARA WELCH on 5/30/2025 at 2:03 pm.  (**-RCF-**) Findings:  See findings.   Signed by: Shanel Valencia 5/30/2025 2:05 PM Dictation workstation:   [id number]             Assessment & Plan  Primary hypertension    Hyperlipidemia    Hypo-osmolar hyponatremia    TAMMY (acute kidney injury)    Atrial fibrillation (Multi)    UTI (urinary tract infection)    Acute urinary retention    Severe hypo-osmolar hyponatremia, improving  - Patient is alert and oriented to self, birthday, president and year and appears mentating well.  She does have some generalized weakness but no obvious gross focal neurologic deficits.  - 5/30 serum osmol 251  - TSH 0.58  - Na 109>111>112>114>113>120 > 121>124>127>125>127  - cortisol 28.5  - urine Na 22 - consistent with hypovolemic hyponatremia, likely secondary to dehydration  - patient is not on any obvious home medications to cause hyponatremia.  - Sodium was 130 on 5/30/2024.  - 3% hypertonic saline at 15 mL/h for 4 hours x 2  - ICU monitoring  - Goal to achieve a 24-hour increase in serum sodium of 4 to 6 mEq/L.   - seizure precautions.  - Q6 BMP  - appetite at baseline  - continue sodium bicarb 650 mg TID     Acute kidney injury, improving  - Likely secondary to dehydration with decreased oral intake.  - creat 2.68 > 2.88 > 3.25 > 3.21 >3.20>3.08>2.33>1.92>1.74>1.5  - urine creat 71.1 > 34.8  - urine sodium 23, Fena calculated showing likely pre-renal cause  - Patient given 1 L of IV fluids in the ER.  As above, will place on hypertonic saline at 15 mL/h for 4 hours and check BMPs every 4 hours (complete)  - 5/31 received D5 .45  overnight - bolus 100ml 3% NS this am and then 0.9NaCl @125ml/hr and monitor q4hr BMP   - CT scan of the abdomen pelvis with no signs of hydronephrosis, powell placed for retention  - Hold home medication of losartan with acute kidney injury.  - daily BMP     Nonspecific enteritis  - Dr. Garner consult, appreciate recs, feels no surgical need at this time   - tolerating diet  - WBC 19.8>18.8>19.3>19.9>15.1>15.7>15.3  - blood culture: grew E. Coli  - continue Augmentin 875 mg BID  - ID consult     New onset atrial fibrillation  Essential HTN  AAA  - continue metoprolol succinate 100 mg daily, amlodipine 10 mg daily  - hold losartan  - discontinued heparin gtt   - continue apixaban 5 mg BID  - echo: normal LVSF with an EF of 65-70%. Moderate mitral regurgitation. Moderate to severe tricuspid regurgitation. Mild aortic  regurgitation.  9. A bubble study using agitated saline was performed. Bubble study is positive. A large PFO (> 20 bubbles) was demonstrated  - Consult cardiology, recommending outpatient cardioversion after being apixaban for 1 month  - cardiac monitoring via telemetry  - 4 cm ascending thoracic aortic aneurysm found incidentally on CT scan  - Recommend follow-up with primary care provider and cardiothoracic surgery as an outpatient.  - patient is aware of findings   - monitor BP and HR     Acute cystitis with hematuria  Bacteremia  - UA 2+ blood; 3+ leukocytes, > 50 WBC; 4+bact  - urine culture grew E. Coli  - Blood cultures 4/4 positive for E. Coli  - Given ceftriaxone and zosyn  - continue augmentin 875 mg BID x 14 days  - consult ID, appreciate recs      Urinary retention  - Powell catheter   - strict IO  - void trial today; removed Powell  - Good urine output 10,105 mL in the past 24hr     Iron Deficiency  Normocytic Anemia  - Hb 7.8/MCV 82  - Iron 25; ; % sat 14  - given IV venofer 300 mg once  - started ferrous gluconate 324 mg daily with ascorbic acid 500 mg BID     Vitamin D deficiency  -  continue cholecalciferol 25 mcg daily     Oral Candidiasis  - continue nystatin 500,000 units QID         DVT ppx:   - discontinued heparin gtt  - continue apixaban 5 mg BID    PUD ppx: with severe anemia & physiological stress - add protonix   Code Status: Full  Dispo: pt requires overnight stay       ARNULFO Fuller-CNP           [1] amLODIPine, 10 mg, oral, Daily  amoxicillin-clavulanate, 1 tablet, oral, BID  apixaban, 5 mg, oral, BID  ascorbic acid, 500 mg, oral, Daily  calcium carbonate, 500 mg of calcium carbonate, oral, TID  cholecalciferol, 25 mcg, oral, Daily  ferrous gluconate, 38 mg of elemental iron, oral, Daily with breakfast  insulin lispro, 0-10 Units, subcutaneous, TID AC  [Held by provider] losartan, 100 mg, oral, Daily  metoprolol succinate XL, 100 mg, oral, Daily  nystatin, 5 mL, Swish & Spit, 4x daily  sennosides-docusate sodium, 2 tablet, oral, BID  sodium bicarbonate, 650 mg, oral, TID  [2] sodium chloride 0.9%, 10 mL/hr  [3] PRN medications: acetaminophen **OR** [DISCONTINUED] acetaminophen **OR** [DISCONTINUED] acetaminophen, dextromethorphan-guaifenesin, ipratropium-albuteroL, magnesium hydroxide, oxyCODONE, oxygen, sodium chloride 0.9%

## 2025-06-07 LAB
ANION GAP SERPL CALC-SCNC: 14 MMOL/L (ref 10–20)
BASOPHILS # BLD AUTO: 0.04 X10*3/UL (ref 0–0.1)
BASOPHILS NFR BLD AUTO: 0.3 %
BUN SERPL-MCNC: 30 MG/DL (ref 6–23)
CALCIUM SERPL-MCNC: 8.2 MG/DL (ref 8.6–10.3)
CHLORIDE SERPL-SCNC: 95 MMOL/L (ref 98–107)
CO2 SERPL-SCNC: 23 MMOL/L (ref 21–32)
CREAT SERPL-MCNC: 1.23 MG/DL (ref 0.5–1.05)
EGFRCR SERPLBLD CKD-EPI 2021: 46 ML/MIN/1.73M*2
EOSINOPHIL # BLD AUTO: 0.04 X10*3/UL (ref 0–0.4)
EOSINOPHIL NFR BLD AUTO: 0.3 %
ERYTHROCYTE [DISTWIDTH] IN BLOOD BY AUTOMATED COUNT: 15.1 % (ref 11.5–14.5)
FOLATE SERPL-MCNC: 12.2 NG/ML
GLUCOSE BLD MANUAL STRIP-MCNC: 110 MG/DL (ref 74–99)
GLUCOSE BLD MANUAL STRIP-MCNC: 112 MG/DL (ref 74–99)
GLUCOSE BLD MANUAL STRIP-MCNC: 115 MG/DL (ref 74–99)
GLUCOSE BLD MANUAL STRIP-MCNC: 135 MG/DL (ref 74–99)
GLUCOSE SERPL-MCNC: 112 MG/DL (ref 74–99)
HCT VFR BLD AUTO: 19.9 % (ref 36–46)
HGB BLD-MCNC: 7.1 G/DL (ref 12–16)
IMM GRANULOCYTES # BLD AUTO: 0.39 X10*3/UL (ref 0–0.5)
IMM GRANULOCYTES NFR BLD AUTO: 2.6 % (ref 0–0.9)
LYMPHOCYTES # BLD AUTO: 0.7 X10*3/UL (ref 0.8–3)
LYMPHOCYTES NFR BLD AUTO: 4.6 %
MCH RBC QN AUTO: 30.9 PG (ref 26–34)
MCHC RBC AUTO-ENTMCNC: 35.7 G/DL (ref 32–36)
MCV RBC AUTO: 87 FL (ref 80–100)
MONOCYTES # BLD AUTO: 1.03 X10*3/UL (ref 0.05–0.8)
MONOCYTES NFR BLD AUTO: 6.8 %
NEUTROPHILS # BLD AUTO: 13.01 X10*3/UL (ref 1.6–5.5)
NEUTROPHILS NFR BLD AUTO: 85.4 %
NRBC BLD-RTO: 0 /100 WBCS (ref 0–0)
PLATELET # BLD AUTO: 445 X10*3/UL (ref 150–450)
POTASSIUM SERPL-SCNC: 3.7 MMOL/L (ref 3.5–5.3)
RBC # BLD AUTO: 2.3 X10*6/UL (ref 4–5.2)
SODIUM SERPL-SCNC: 128 MMOL/L (ref 136–145)
WBC # BLD AUTO: 15.2 X10*3/UL (ref 4.4–11.3)

## 2025-06-07 PROCEDURE — 2500000001 HC RX 250 WO HCPCS SELF ADMINISTERED DRUGS (ALT 637 FOR MEDICARE OP): Mod: IPSPLIT | Performed by: HOSPITALIST

## 2025-06-07 PROCEDURE — 97535 SELF CARE MNGMENT TRAINING: CPT | Mod: GO,IPSPLIT

## 2025-06-07 PROCEDURE — 36415 COLL VENOUS BLD VENIPUNCTURE: CPT | Mod: IPSPLIT | Performed by: NURSE PRACTITIONER

## 2025-06-07 PROCEDURE — 94760 N-INVAS EAR/PLS OXIMETRY 1: CPT | Mod: IPSPLIT

## 2025-06-07 PROCEDURE — 82947 ASSAY GLUCOSE BLOOD QUANT: CPT | Mod: IPSPLIT

## 2025-06-07 PROCEDURE — 1200000002 HC GENERAL ROOM WITH TELEMETRY DAILY: Mod: IPSPLIT

## 2025-06-07 PROCEDURE — 2500000001 HC RX 250 WO HCPCS SELF ADMINISTERED DRUGS (ALT 637 FOR MEDICARE OP): Mod: IPSPLIT | Performed by: INTERNAL MEDICINE

## 2025-06-07 PROCEDURE — 2500000004 HC RX 250 GENERAL PHARMACY W/ HCPCS (ALT 636 FOR OP/ED): Mod: IPSPLIT | Performed by: HOSPITALIST

## 2025-06-07 PROCEDURE — 85025 COMPLETE CBC W/AUTO DIFF WBC: CPT | Mod: IPSPLIT | Performed by: NURSE PRACTITIONER

## 2025-06-07 PROCEDURE — 99232 SBSQ HOSP IP/OBS MODERATE 35: CPT | Performed by: NURSE PRACTITIONER

## 2025-06-07 PROCEDURE — 80048 BASIC METABOLIC PNL TOTAL CA: CPT | Mod: IPSPLIT | Performed by: NURSE PRACTITIONER

## 2025-06-07 PROCEDURE — 2500000001 HC RX 250 WO HCPCS SELF ADMINISTERED DRUGS (ALT 637 FOR MEDICARE OP): Mod: IPSPLIT | Performed by: NURSE PRACTITIONER

## 2025-06-07 RX ORDER — AMOXICILLIN AND CLAVULANATE POTASSIUM 875; 125 MG/1; MG/1
1 TABLET, FILM COATED ORAL EVERY 12 HOURS SCHEDULED
Status: DISCONTINUED | OUTPATIENT
Start: 2025-06-07 | End: 2025-06-09 | Stop reason: HOSPADM

## 2025-06-07 RX ORDER — ALUMINUM HYDROXIDE, MAGNESIUM HYDROXIDE, AND SIMETHICONE 1200; 120; 1200 MG/30ML; MG/30ML; MG/30ML
20 SUSPENSION ORAL EVERY 4 HOURS PRN
Status: DISCONTINUED | OUTPATIENT
Start: 2025-06-07 | End: 2025-06-09 | Stop reason: HOSPADM

## 2025-06-07 RX ORDER — CALCIUM CARBONATE 200(500)MG
500 TABLET,CHEWABLE ORAL 4 TIMES DAILY PRN
Status: DISCONTINUED | OUTPATIENT
Start: 2025-06-07 | End: 2025-06-09 | Stop reason: HOSPADM

## 2025-06-07 RX ADMIN — SODIUM BICARBONATE 650 MG: 650 TABLET ORAL at 15:21

## 2025-06-07 RX ADMIN — SENNOSIDES AND DOCUSATE SODIUM 2 TABLET: 50; 8.6 TABLET ORAL at 08:48

## 2025-06-07 RX ADMIN — ALUMINUM HYDROXIDE, MAGNESIUM HYDROXIDE, AND SIMETHICONE 20 ML: 1200; 120; 1200 SUSPENSION ORAL at 03:35

## 2025-06-07 RX ADMIN — APIXABAN 5 MG: 5 TABLET, FILM COATED ORAL at 08:36

## 2025-06-07 RX ADMIN — AMOXICILLIN AND CLAVULANATE POTASSIUM 1 TABLET: 875; 125 TABLET, FILM COATED ORAL at 20:53

## 2025-06-07 RX ADMIN — PANTOPRAZOLE SODIUM 40 MG: 40 TABLET, DELAYED RELEASE ORAL at 05:56

## 2025-06-07 RX ADMIN — NYSTATIN 500000 UNITS: 100000 SUSPENSION ORAL at 05:56

## 2025-06-07 RX ADMIN — NYSTATIN 500000 UNITS: 100000 SUSPENSION ORAL at 20:53

## 2025-06-07 RX ADMIN — SENNOSIDES AND DOCUSATE SODIUM 2 TABLET: 50; 8.6 TABLET ORAL at 20:53

## 2025-06-07 RX ADMIN — AMOXICILLIN AND CLAVULANATE POTASSIUM 1 TABLET: 500; 125 TABLET, FILM COATED ORAL at 08:36

## 2025-06-07 RX ADMIN — SODIUM BICARBONATE 650 MG: 650 TABLET ORAL at 20:53

## 2025-06-07 RX ADMIN — NYSTATIN 500000 UNITS: 100000 SUSPENSION ORAL at 11:09

## 2025-06-07 RX ADMIN — NYSTATIN 500000 UNITS: 100000 SUSPENSION ORAL at 15:21

## 2025-06-07 RX ADMIN — AMLODIPINE BESYLATE 10 MG: 10 TABLET ORAL at 08:36

## 2025-06-07 RX ADMIN — METOPROLOL SUCCINATE 100 MG: 100 TABLET, EXTENDED RELEASE ORAL at 08:36

## 2025-06-07 RX ADMIN — Medication 25 MCG: at 08:36

## 2025-06-07 RX ADMIN — GUAIFENESIN AND DEXTROMETHORPHAN 5 ML: 100; 10 SYRUP ORAL at 03:34

## 2025-06-07 RX ADMIN — CALCIUM CARBONATE (ANTACID) CHEW TAB 500 MG 1 TABLET: 500 CHEW TAB at 15:21

## 2025-06-07 RX ADMIN — Medication 1 TABLET: at 08:36

## 2025-06-07 RX ADMIN — CALCIUM CARBONATE (ANTACID) CHEW TAB 500 MG 1 TABLET: 500 CHEW TAB at 20:53

## 2025-06-07 RX ADMIN — OXYCODONE HYDROCHLORIDE AND ACETAMINOPHEN 500 MG: 500 TABLET ORAL at 08:36

## 2025-06-07 RX ADMIN — CALCIUM CARBONATE (ANTACID) CHEW TAB 500 MG 1 TABLET: 500 CHEW TAB at 08:36

## 2025-06-07 RX ADMIN — APIXABAN 5 MG: 5 TABLET, FILM COATED ORAL at 20:53

## 2025-06-07 RX ADMIN — SODIUM BICARBONATE 650 MG: 650 TABLET ORAL at 08:36

## 2025-06-07 ASSESSMENT — COGNITIVE AND FUNCTIONAL STATUS - GENERAL
TOILETING: A LITTLE
HELP NEEDED FOR BATHING: A LITTLE
DRESSING REGULAR UPPER BODY CLOTHING: A LITTLE
PERSONAL GROOMING: A LITTLE
DRESSING REGULAR LOWER BODY CLOTHING: A LITTLE
DAILY ACTIVITIY SCORE: 19

## 2025-06-07 ASSESSMENT — PAIN SCALES - GENERAL
PAINLEVEL_OUTOF10: 0 - NO PAIN

## 2025-06-07 ASSESSMENT — ACTIVITIES OF DAILY LIVING (ADL): HOME_MANAGEMENT_TIME_ENTRY: 49

## 2025-06-07 ASSESSMENT — PAIN - FUNCTIONAL ASSESSMENT: PAIN_FUNCTIONAL_ASSESSMENT: 0-10

## 2025-06-07 NOTE — CARE PLAN
"The patient's goals for the shift include \"get some sleep\"    The clinical goals for the shift include Client report decreased N/V.    Pt c/o cough and dyspepsia when I assumed care at 0300. Meds given and pt has been resting quietly since. Uneventful otherwise  "

## 2025-06-07 NOTE — PROGRESS NOTES
"Mireya Nava is a 74 y.o. female on day 8 of admission presenting with Generalized weakness.      Subjective   \"Everything is flowing - my kids are so mad at me for waiting so long\"   Denies constipation, n/v - admits difficulty with eating due to thrush   Overall feeling better. States she has been getting up to use the bathroom frequently. Complains of swell in her ankles and feet.        Objective     Last Recorded Vitals  /64 (BP Location: Right arm, Patient Position: Lying)   Pulse 94   Temp 36.6 °C (97.9 °F) (Temporal)   Resp 16   Wt 76.1 kg (167 lb 12.3 oz)   SpO2 97%   Intake/Output last 3 Shifts:    Intake/Output Summary (Last 24 hours) at 6/7/2025 1121  Last data filed at 6/7/2025 0414  Gross per 24 hour   Intake 220 ml   Output 1000 ml   Net -780 ml       Admission Weight  Weight: 65.8 kg (145 lb) (05/30/25 1036)    Daily Weight  06/07/25 : 76.1 kg (167 lb 12.3 oz)    Image Results  ECG 12 lead  Atrial fibrillation  Cannot rule out Anterior infarct , age undetermined  Abnormal ECG  When compared with ECG of 02-JUN-2025 11:27, (unconfirmed)  Atrial fibrillation has replaced Sinus rhythm  XR chest 1 view  Narrative: Interpreted By:  Aaron Hanna,   STUDY:  XR CHEST 1 VIEW;  6/4/2025 10:51 am      INDICATION:  Signs/Symptoms:per nuclear request.          COMPARISON:  Chest x-ray 06/01/2025      ACCESSION NUMBER(S):  [id number]      ORDERING CLINICIAN:  ALEXA PROCTOR      FINDINGS:  AP radiograph of the chest was provided.              CARDIOMEDIASTINAL SILHOUETTE:  Cardiomediastinal silhouette is normal in size and configuration.      LUNGS:  Lungs are clear.      ABDOMEN:  No remarkable upper abdominal findings.      BONES:  No acute osseous changes.      Impression: 1.  No evidence of acute cardiopulmonary process.              MACRO:  None      Signed by: Aaron Hanna 6/4/2025 4:09 PM  Dictation workstation:   [id number]  NM Lung perfusion with spect  Narrative: " Interpreted By:  Floyd Donnelly and Korakavi Nisha   STUDY:  NM LUNG PERFUSION WITH SPECT;  6/4/2025 9:22 am      INDICATION:  Signs/Symptoms:elevated d dimer.          COMPARISON:  None.      ACCESSION NUMBER(S):  [id number]      ORDERING CLINICIAN:  LUKE YEUNG      TECHNIQUE:  DIVISION OF NUCLEAR MEDICINE  VENTILATION/PERFUSION LUNG SCANS      Perfusion images of the lungs were then acquired after the  intravenous administration of  4.3 mCi of Tc-99m macroaggregated  albumin (MAA). Additionally, SPECT images were obtained.      FINDINGS:  Perfusion images demonstrate a normal distribution of  radiopharmaceutical throughout the lung fields.  There are no  segmental or subsegmental perfusion abnormalities.      Impression: Normal perfusion lung scan with no evidence of segmental or  subsegmental perfusion abnormality.      I personally reviewed the images/study and I agree with the findings  as stated by Resident Dr. Vane Renteria MD. This study was  interpreted at [place], [place].      MACRO:  None      Signed by: Floyd Donnelly 6/4/2025 11:18 AM  Dictation workstation:   [id number]      Physical Exam  Constitutional:       Appearance: Normal appearance.   HENT:      Head: Normocephalic.      Nose: Nose normal.      Mouth/Throat:      Mouth: Mucous membranes are moist.   Eyes:      Extraocular Movements: Extraocular movements intact.   Cardiovascular:      Rate and Rhythm: Normal rate. Rhythm irregular.      Pulses: Normal pulses.      Heart sounds: Normal heart sounds.   Pulmonary:      Effort: Pulmonary effort is normal.      Breath sounds: Normal breath sounds.   Abdominal:      General: Bowel sounds are normal.      Palpations: Abdomen is soft.   Musculoskeletal:         General: Normal range of motion.      Cervical back: Normal range of motion.   Skin:     General: Skin is warm and dry.      Capillary Refill: Capillary refill takes less than 2  seconds.      Comments: Oral thrush present     Neurological:      General: No focal deficit present.      Mental Status: She is alert and oriented to person, place, and time.   Psychiatric:         Mood and Affect: Mood normal.         Behavior: Behavior normal.         Relevant Results             Scheduled medications  Scheduled Medications[1]  Continuous medications  Continuous Medications[2]  PRN medications  PRN Medications[3]  Results for orders placed or performed during the hospital encounter of 05/30/25 (from the past 24 hours)   Occult Blood, Stool    Specimen: Stool   Result Value Ref Range    Occult Blood, Stool X1 Negative Negative   Folate   Result Value Ref Range    Folate, Serum 12.2 >5.0 ng/mL   POCT GLUCOSE   Result Value Ref Range    POCT Glucose 95 74 - 99 mg/dL   POCT GLUCOSE   Result Value Ref Range    POCT Glucose 112 (H) 74 - 99 mg/dL   CBC and Auto Differential   Result Value Ref Range    WBC 15.2 (H) 4.4 - 11.3 x10*3/uL    nRBC 0.0 0.0 - 0.0 /100 WBCs    RBC 2.30 (L) 4.00 - 5.20 x10*6/uL    Hemoglobin 7.1 (L) 12.0 - 16.0 g/dL    Hematocrit 19.9 (L) 36.0 - 46.0 %    MCV 87 80 - 100 fL    MCH 30.9 26.0 - 34.0 pg    MCHC 35.7 32.0 - 36.0 g/dL    RDW 15.1 (H) 11.5 - 14.5 %    Platelets 445 150 - 450 x10*3/uL    Neutrophils % 85.4 40.0 - 80.0 %    Immature Granulocytes %, Automated 2.6 (H) 0.0 - 0.9 %    Lymphocytes % 4.6 13.0 - 44.0 %    Monocytes % 6.8 2.0 - 10.0 %    Eosinophils % 0.3 0.0 - 6.0 %    Basophils % 0.3 0.0 - 2.0 %    Neutrophils Absolute 13.01 (H) 1.60 - 5.50 x10*3/uL    Immature Granulocytes Absolute, Automated 0.39 0.00 - 0.50 x10*3/uL    Lymphocytes Absolute 0.70 (L) 0.80 - 3.00 x10*3/uL    Monocytes Absolute 1.03 (H) 0.05 - 0.80 x10*3/uL    Eosinophils Absolute 0.04 0.00 - 0.40 x10*3/uL    Basophils Absolute 0.04 0.00 - 0.10 x10*3/uL   Basic Metabolic Panel   Result Value Ref Range    Glucose 112 (H) 74 - 99 mg/dL    Sodium 128 (L) 136 - 145 mmol/L    Potassium 3.7 3.5 - 5.3  mmol/L    Chloride 95 (L) 98 - 107 mmol/L    Bicarbonate 23 21 - 32 mmol/L    Anion Gap 14 10 - 20 mmol/L    Urea Nitrogen 30 (H) 6 - 23 mg/dL    Creatinine 1.23 (H) 0.50 - 1.05 mg/dL    eGFR 46 (L) >60 mL/min/1.73m*2    Calcium 8.2 (L) 8.6 - 10.3 mg/dL   POCT GLUCOSE   Result Value Ref Range    POCT Glucose 115 (H) 74 - 99 mg/dL     ECG 12 lead  Result Date: 6/4/2025  Atrial fibrillation Cannot rule out Anterior infarct , age undetermined Abnormal ECG When compared with ECG of 02-JUN-2025 11:27, (unconfirmed) Atrial fibrillation has replaced Sinus rhythm    XR chest 1 view  Result Date: 6/4/2025  Interpreted By:  Aaron Hanna, STUDY: XR CHEST 1 VIEW;  6/4/2025 10:51 am   INDICATION: Signs/Symptoms:per nuclear request.     COMPARISON: Chest x-ray 06/01/2025   ACCESSION NUMBER(S): [id number]   ORDERING CLINICIAN: ALEXA PROCTOR   FINDINGS: AP radiograph of the chest was provided.       CARDIOMEDIASTINAL SILHOUETTE: Cardiomediastinal silhouette is normal in size and configuration.   LUNGS: Lungs are clear.   ABDOMEN: No remarkable upper abdominal findings.   BONES: No acute osseous changes.       1.  No evidence of acute cardiopulmonary process.       MACRO: None   Signed by: Aaron Hanna 6/4/2025 4:09 PM Dictation workstation:   [id number]    NM Lung perfusion with spect  Result Date: 6/4/2025  Interpreted By:  Floyd Donnelly and Korakavi Nisha STUDY: NM LUNG PERFUSION WITH SPECT;  6/4/2025 9:22 am   INDICATION: Signs/Symptoms:elevated d dimer.     COMPARISON: None.   ACCESSION NUMBER(S): [id number]   ORDERING CLINICIAN: LUKE YEUNG   TECHNIQUE: DIVISION OF NUCLEAR MEDICINE VENTILATION/PERFUSION LUNG SCANS   Perfusion images of the lungs were then acquired after the intravenous administration of  4.3 mCi of Tc-99m macroaggregated albumin (MAA). Additionally, SPECT images were obtained.   FINDINGS: Perfusion images demonstrate a normal distribution of radiopharmaceutical throughout the lung  fields.  There are no segmental or subsegmental perfusion abnormalities.       Normal perfusion lung scan with no evidence of segmental or subsegmental perfusion abnormality.   I personally reviewed the images/study and I agree with the findings as stated by Resident Dr. Vane Renteria MD. This study was interpreted at [place], [place].   MACRO: None   Signed by: Floyd Donnelly 6/4/2025 11:18 AM Dictation workstation:   [id number]    Transthoracic Echo Complete  Result Date: 6/2/2025   [facility], [address] [place] [address]              Tel [phone] and Fax [phone] TRANSTHORACIC ECHOCARDIOGRAM REPORT  Patient Name:       FANNY DARLING   Reading Physician:    [id number] Rosanna Arevalo MD Study Date:         6/2/2025            Ordering Provider:    [id number] ELICEO WONG MRN/PID:            [id number]            Fellow: Accession#:         [id number]        Nurse:                Bev Luna RN Date of Birth/Age:  1950 / 74      Sonographer:          Aria Dixon RDCS                     years Gender assigned at  F                   Additional Staff: Birth: Height:             162.56 cm           Admit Date: Weight:             78.93 kg            Admission Status:     Inpatient -                                                               Routine BSA / BMI:          1.84 m2 / 29.87     Encounter#:           [id number]                     kg/m2 Blood Pressure:     108/61 mmHg         Department Location:  [facility] Study Type:    TRANSTHORACIC ECHO (TTE) COMPLETE Diagnosis/ICD: Unspecified atrial fibrillation-I48.91 Indication:    AFib CPT Code:      Echo Complete w Full Doppler-[id number] Patient History: Pertinent History: A-Fib, HTN, Hyperlipidemia and Murmur. Weakness. Study Detail: The following Echo  studies were performed: 2D, M-Mode, Doppler and               color flow. Agitated saline used as a contrast agent for               intraseptal flow evaluation. The patient was awake.  PHYSICIAN INTERPRETATION: Left Ventricle: Left ventricular ejection fraction is normal by visual estimate at 65-70%. There are no regional left ventricular wall motion abnormalities. The left ventricular cavity size is normal. There is mildly increased septal and mildly increased posterior left ventricular wall thickness. There is left ventricular concentric remodeling. Spectral Doppler shows a normal pattern of left ventricular diastolic filling. Left Atrium: The left atrium is mildly dilated. The patent foramen ovale was visualized using agitated saline contrast. A bubble study using agitated saline was performed. Bubble study is positive. A large PFO (> 20 bubbles) was demonstrated. Right Ventricle: The right ventricle is mildly enlarged. There is normal right ventricular global systolic function. Right Atrium: The right atrium is mildly dilated. Aortic Valve: There is mild aortic valve regurgitation. Mitral Valve: The mitral valve is normal in structure. There is moderate mitral valve regurgitation. The E Vmax is 1.02 m/s. Tricuspid Valve: The tricuspid valve is structurally normal. There is moderate to severe tricuspid regurgitation. The doppler estimated RVSP is within normal limits with a right ventricular systolic pressure of 30 mmHg. Pulmonic Valve: The pulmonic valve is structurally normal. There is physiologic pulmonic valve regurgitation. Pericardium: There is no pericardial effusion noted. Aorta: The aortic root is normal.  CONCLUSIONS:  1. Left ventricular ejection fraction is normal by visual estimate at 65-70%.  2. There is normal right ventricular global systolic function.  3. Mildly enlarged right ventricle.  4. The left atrium is mildly dilated.  5. Moderate mitral valve regurgitation.  6. Moderate to severe  tricuspid regurgitation visualized.  7. The doppler estimated RVSP is within normal limits with a right ventricular systolic pressure of 30 mmHg.  8. Mild aortic valve regurgitation.  9. A bubble study using agitated saline was performed. Bubble study is positive. A large PFO (> 20 bubbles) was demonstrated. QUANTITATIVE DATA SUMMARY:  2D MEASUREMENTS:          Normal Ranges: Ao Root d:       3.20 cm  (2.0-3.7cm) LAs:             3.90 cm  (2.7-4.0cm) IVSd:            1.20 cm  (0.6-1.1cm) LVPWd:           1.19 cm  (0.6-1.1cm) LVIDd:           3.69 cm  (3.9-5.9cm) LVIDs:           2.39 cm LV Mass Index:   79 g/m2 LVEDV Index:     44 ml/m2 LV % FS          35.2 %  LEFT ATRIUM:                  Normal Ranges: LA Vol A4C:        41.6 ml    (22+/-6mL/m2) LA Vol A2C:        69.7 ml LA Vol BP:         61.0 ml LA Vol Index A4C:  22.6ml/m2 LA Vol Index A2C:  37.8 ml/m2 LA Vol Index BP:   33.1 ml/m2 LA Area A4C:       15.0 cm2 LA Area A2C:       22.0 cm2 LA Major Axis A4C: 4.6 cm LA Major Axis A2C: 5.9 cm  RIGHT ATRIUM:                 Normal Ranges: RA Vol A4C:        31.9 ml    (8.3-19.5ml) RA Vol Index A4C:  17.3 ml/m2 RA Area A4C:       15.0 cm2 RA Major Axis A4C: 6.0 cm  AORTA MEASUREMENTS:         Normal Ranges: Asc Ao, d:          3.50 cm (2.1-3.4cm)  LV SYSTOLIC FUNCTION:                      Normal Ranges: EF-A4C View:    71 % (>=55%) EF-A2C View:    71 % EF-Biplane:     72 % EF-Visual:      68 % LV EF Reported: 68 %  LV DIASTOLIC FUNCTION:            Normal Ranges: MV Peak E:             1.02 m/s   (0.7-1.2 m/s) MV e'                  0.151 m/s  (>8.0) MV lateral e'          0.18 m/s MV medial e'           0.13 m/s E/e' Ratio:            6.75       (<8.0) PulmV Sys Danny:         38.10 cm/s PulmV Godfrey Danny:        46.90 cm/s PulmV S/D Danny:         0.80  MITRAL VALVE:          Normal Ranges: MV DT:        213 msec (150-240msec)  AORTIC VALVE:            Normal Ranges: AoV Vmax:      1.80 m/s  (<=1.7m/s) AoV Peak PG:    13.0 mmHg (<20mmHg) LVOT Max Danny:  1.12 m/s  (<=1.1m/s) LVOT VTI:      19.80 cm LVOT Diameter: 2.00 cm   (1.8-2.4cm) AoV Area,Vmax: 1.95 cm2  (2.5-4.5cm2)  AORTIC INSUFFICIENCY: AI Vmax:       3.18 m/s AI Half-time:  615 msec AI Decel Rate: 152.00 cm/s2  RIGHT VENTRICLE: RV Basal 4.70 cm RV Mid   3.90 cm RV Major 7.9 cm TAPSE:   24.4 mm RV s'    0.13 m/s  TRICUSPID VALVE/RVSP:          Normal Ranges: Peak TR Velocity:     2.58 m/s Est. RA Pressure:     3 RV Syst Pressure:     30       (< 30mmHg) IVC Diam:             1.48 cm  PULMONIC VALVE:          Normal Ranges: PV Max Danny:     1.3 m/s  (0.6-0.9m/s) PV Max P.4 mmHg  PULMONARY VEINS: PulmV Godfrey Danny: 46.90 cm/s PulmV S/D Danny:  0.80 PulmV Sys Danny:  38.10 cm/s  [id number] Rosanna Arevalo MD Electronically signed on 2025 at 9:27:59 PM  ** Final **     XR abdomen 1 view  Result Date: 2025  Interpreted By:  Aaron Hanna, STUDY: XR ABDOMEN 1 VIEW;  2025 4:19 pm   INDICATION: Signs/Symptoms:n/v.     COMPARISON: CT chest abdomen and pelvis 2020   ACCESSION NUMBER(S): [id number]   ORDERING CLINICIAN: LUKE YEUNG   FINDINGS: Nonobstructive bowel gas pattern. Limited evaluation of pneumoperitoneum on supine imaging, however no gross evidence of free air is noted.   Visualized lungs are clear.   Osseous structures demonstrate no acute bony changes.       1.  Unremarkable exam.   MACRO: None   Signed by: Aaron Hanna 2025 4:30 PM Dictation workstation:   [id number]    Electrocardiogram, 12-lead PRN ACS symptoms  Result Date: 2025  Sinus rhythm with Premature atrial complexes Otherwise normal ECG When compared with ECG of 30-MAY-2025 11:11, (unconfirmed) Sinus rhythm has replaced Atrial fibrillation    ECG 12 lead  Result Date: 2025  Atrial fibrillation Abnormal ECG When compared with ECG of 27-DEC-2005 11:33, Atrial fibrillation has replaced Sinus rhythm Vent. rate has increased BY  31 BPM Nonspecific T wave  abnormality now evident in Anterior leads    XR chest 1 view  Result Date: 6/1/2025  Interpreted By:  Myrtle Duque, STUDY: XR CHEST 1 VIEW;  6/1/2025 1:37 am   INDICATION: Signs/Symptoms:change in breathing     COMPARISON: CT chest, abdomen, pelvis 05/30/2025.   ACCESSION NUMBER(S): [id number]   ORDERING CLINICIAN: ALEXA PROCTOR   TECHNIQUE: Portable upright frontal view of the chest was obtained .   FINDINGS: Monitoring leads are overlying the patient.   The heart is enlarged. There is mild interstitial edema.   There is a small left pleural effusion with airspace opacity at the left lung base. No pneumothorax.       1.  Cardiomegaly. Mild interstitial edema. Small left pleural effusion with airspace opacity at the left lung base, may be secondary to atelectasis or pneumonia.       MACRO: None.   Signed by: Myrtle Duque 6/1/2025 3:24 AM Dictation workstation:   [id number]    CT chest abdomen pelvis wo IV contrast  Result Date: 5/30/2025  Interpreted By:  Shanel Valencia, STUDY: CT CHEST ABDOMEN PELVIS WO CONTRAST;  5/30/2025 12:50 pm   INDICATION: Signs/Symptoms:cough, abd pain.   COMPARISON: None.   ACCESSION NUMBER(S): [id number]   ORDERING CLINICIAN: DARA WELCH   TECHNIQUE: CT of the chest, abdomen, and pelvis was performed without intravenous contrast.   FINDINGS:   CHEST:   Lines/Devices: None   Lungs: The trachea and central airways are patent without endobronchial lesions. There is a small left pleural effusion with adjacent compressive atelectasis. No focal consolidation, pulmonary edema, pneumothorax or suspicious nodule.   Vasculature: Mild dilation of the thoracic aorta in the ascending segment measuring 4 cm. The main pulmonary artery is normal in size. Mild coronary artery calcifications noted in the LAD.   Heart: Heart size is normal. No pericardial effusion.   Mediastinum and lyudmila: No pathologically enlarged thoracic lymphadenopathy. The esophagus is unremarkable.   Chest wall and  lower neck: No significant chest wall soft tissue abnormalities. The visualized thyroid is normal.   ABDOMEN/PELVIS:   Liver: No mass. Hepatomegaly measuring 20 cm in the craniocaudal dimension.   Biliary: No intrahepatic or extrahepatic bile duct dilation. The gallbladder is collapsed and limited for evaluation.   Spleen: No mass. No splenomegaly.   Pancreas: No mass, main duct dilation or acute inflammation.   Adrenals: Normal.   Kidneys: No calculus or hydronephrosis.   GI tract: There is a small amount of free fluid and fat stranding centered around the cecum. The appendix is not identified. No pericecal free air or organized fluid collection. There are multiple loops of fluid-filled, nondilated small bowel in the pelvis. No bowel obstruction or bowel wall thickening otherwise.   Lymph nodes: No abdominopelvic lymphadenopathy.   Mesentery/peritoneum: No free air or fluid collection. There is mild generalized haziness involving the peritoneum.   Vasculature: Moderate abdominal aortic atherosclerotic calcifications without aneurysmal dilation.   Pelvis: No free air or fluid collection. Trace free fluid in the dependent pelvis. The bladder is moderate distended but otherwise normal-appearing. The uterus appears grossly unremarkable.   Bones/Soft tissues: Mild levocurvature of the lumbar spine with multilevel thoracolumbar degenerative disc disease. Moderate to severe bilateral hip osteoarthritis. Mild abdominal wall edema.         Limited examination without intravenous contrast.   The appendix is not visualized, although right lower quadrant inflammation and free fluid centered around the cecum raise concern for possible acute appendicitis. No organized fluid collection. Please correlate with right lower quadrant tenderness.   Multiple fluid-filled small bowel within the pelvis could represent nonspecific enteritis. No bowel obstruction.   Small left pleural effusion without definite evidence of pneumonia.   4 cm  ascending thoracic aortic aneurysm.   MACRO ShunduanePerla Valencia discussed the significance and urgency of this critical finding by Epic secure chat with  DARA WELCH on 5/30/2025 at 2:03 pm.  (**-RCF-**) Findings:  See findings.   Signed by: Shanel Valencia 5/30/2025 2:05 PM Dictation workstation:   [id number]             Assessment & Plan  Primary hypertension    Hyperlipidemia    Hypo-osmolar hyponatremia    TAMMY (acute kidney injury)    Atrial fibrillation (Multi)    UTI (urinary tract infection)    Acute urinary retention    Severe hypo-osmolar hyponatremia, improving  - Patient is alert and oriented to self, birthday, president and year and appears mentating well.  She does have some generalized weakness but no obvious gross focal neurologic deficits.  - 5/30 serum osmol 251  - TSH 0.58  - Na 109>111>112>114>113>120 > 121>124>127>125>127>128  - cortisol 28.5  - urine Na 22 - consistent with hypovolemic hyponatremia, likely secondary to dehydration  - patient is not on any obvious home medications to cause hyponatremia.  - Sodium was 130 on 5/30/2024.  - 3% hypertonic saline at 15 mL/h for 4 hours x 2  - ICU monitoring  - Goal to achieve a 24-hour increase in serum sodium of 4 to 6 mEq/L.   - seizure precautions.  - Q6 BMP  - appetite at baseline  - continue sodium bicarb 650 mg TID     Acute kidney injury, improving  - Likely secondary to dehydration with decreased oral intake.  - creat 2.68 > 2.88 > 3.25 > 3.21 >3.20>3.08>2.33>1.92>1.74>1.5>1.23  - urine creat 71.1 > 34.8  - urine sodium 23, Fena calculated showing likely pre-renal cause  - Patient given 1 L of IV fluids in the ER.  As above, will place on hypertonic saline at 15 mL/h for 4 hours and check BMPs every 4 hours (complete)  - 5/31 received D5 .45 overnight - bolus 100ml 3% NS this am and then 0.9NaCl @125ml/hr and monitor q4hr BMP   - CT scan of the abdomen pelvis with no signs of hydronephrosis, powell placed for retention  - Hold home medication of  losartan with acute kidney injury.  - daily BMP     Nonspecific enteritis  - Dr. Garner consult, appreciate recs, feels no surgical need at this time   - tolerating diet  - WBC 19.8>18.8>19.3>19.9>15.1>15.7>15.3  - blood culture: grew E. Coli  - continue Augmentin 875 mg BID  - ID consult     New onset atrial fibrillation  Essential HTN  AAA  - continue metoprolol succinate 100 mg daily, amlodipine 10 mg daily  - hold losartan  - discontinued heparin gtt   - continue apixaban 5 mg BID  - echo: normal LVSF with an EF of 65-70%. Moderate mitral regurgitation. Moderate to severe tricuspid regurgitation. Mild aortic  regurgitation.  9. A bubble study using agitated saline was performed. Bubble study is positive. A large PFO (> 20 bubbles) was demonstrated  - Consult cardiology, recommending outpatient cardioversion after being apixaban for 1 month  - cardiac monitoring via telemetry  - 4 cm ascending thoracic aortic aneurysm found incidentally on CT scan  - Recommend follow-up with primary care provider and cardiothoracic surgery as an outpatient.  - patient is aware of findings   - monitor BP and HR     Acute cystitis with hematuria  Bacteremia  - UA 2+ blood; 3+ leukocytes, > 50 WBC; 4+bact  - urine culture grew E. Coli  - Blood cultures 4/4 positive for E. Coli  - Given ceftriaxone and zosyn  - continue augmentin 875 mg BID x 14 days  - consult ID, appreciate recs      Urinary retention  - Campbell catheter   - strict IO  - void trial today; removed Campbell  - Good urine output 10,105 mL in the past 24hr     Iron Deficiency  Normocytic Anemia  - Hb 7.8/MCV 82  - Iron 25; ; % sat 14  - given IV venofer 300 mg once  - started ferrous gluconate 324 mg daily with ascorbic acid 500 mg BID     Vitamin D deficiency  - continue cholecalciferol 25 mcg daily     Oral Candidiasis  - continue nystatin 500,000 units QID         DVT ppx:   - discontinued heparin gtt  - continue apixaban 5 mg BID    PUD ppx: with severe anemia &  physiological stress - add protonix   Code Status: Full  Dispo: pt requires overnight stay       Rose Shaw, APRN-CNP       [1] amLODIPine, 10 mg, oral, Daily  amoxicillin-clavulanate, 1 tablet, oral, q12h CARLYLE  apixaban, 5 mg, oral, BID  ascorbic acid, 500 mg, oral, Daily  calcium carbonate, 500 mg of calcium carbonate, oral, TID  cholecalciferol, 25 mcg, oral, Daily  ferrous gluconate, 38 mg of elemental iron, oral, Daily with breakfast  insulin lispro, 0-10 Units, subcutaneous, TID AC  [Held by provider] losartan, 100 mg, oral, Daily  metoprolol succinate XL, 100 mg, oral, Daily  nystatin, 5 mL, Swish & Spit, 4x daily  pantoprazole, 40 mg, oral, Daily before breakfast  sennosides-docusate sodium, 2 tablet, oral, BID  sodium bicarbonate, 650 mg, oral, TID     [2] sodium chloride 0.9%, 10 mL/hr     [3] PRN medications: acetaminophen **OR** [DISCONTINUED] acetaminophen **OR** [DISCONTINUED] acetaminophen, alum-mag hydroxide-simeth, calcium carbonate, dextromethorphan-guaifenesin, ipratropium-albuteroL, magnesium hydroxide, oxyCODONE, oxygen, sodium chloride 0.9%

## 2025-06-07 NOTE — NURSING NOTE
Assumed care of pt, pt in bed, call light with in reach, no needs at this time.     0300- pt coughing and reported spitting up phlegm, medicated with robitussin. Call light with in reach, bed alarm on.

## 2025-06-07 NOTE — PROGRESS NOTES
Occupational Therapy    Occupational Therapy Treatment    Name: Mireya Nava  MRN: [id number]  Department: [facility]  Room: [address] [place] [address]  Date: 06/07/25  Time Calculation  Start Time: 1107  Stop Time: 1156  Time Calculation (min): 49 min    Assessment:  OT Assessment: Good participation in graded progression of self care performance,  Increased time needed for self care task completion.  Pt. displays minimal shortness for breath with activity therefore was instructed on use of energy conservation/pacing techniques.  CGA & cues for transfers to the toilet with use of a FWW & grab bar use.  The pt. requires CGA to Close SBA & cues for standing balance during self care with reminders needed for consistent use of one UE for support for safety.  Pt. lives alone & would likely have increased difficulty performing ADL's & IADL's in entirety at this time. The pt.  would consequently benefit from continued  OT services to address her remaining deficit areas in order to return home safely to independent living.  Prognosis: Good  Barriers to Discharge Home: Caregiver assistance, Physical needs  Caregiver Assistance: Patient lives alone and/or does not have reliable caregiver assistance  Physical Needs: Stair navigation into home limited by function/safety, 24hr mobility assistance needed, Intermittent ADL assistance needed, High falls risk due to function or environment  Evaluation/Treatment Tolerance: Patient tolerated treatment well (with therapeutic rest breaks provided as needed.)  Medical Staff Made Aware: Yes  End of Session Communication: Bedside nurse  End of Session Patient Position: Up in chair, Alarm on  Plan:  Treatment Interventions: ADL retraining, Functional transfer training, Endurance training, Patient/family training, Equipment evaluation/education, Neuromuscular reeducation, Compensatory technique education  OT Frequency: 3 times per week  OT Discharge Recommendations: Moderate intensity level of continued  care  Equipment Recommended upon Discharge: Wheeled walker  OT Recommended Transfer Status: Assist of 1  OT - OK to Discharge: Yes Based on completed evaluation and care plan recommendations, no barriers to discharge to next site of care.      Subjective     OT Visit Info:  OT Received On: 06/07/25  General:  General  Reason for Referral: Impaired self care skills & functional transfers due to hospitalization for generalized weakness, sepsis seocdary to E coli bacteremia & UTI.  Past Medical History Relevant to Rehab: HTN, anemia, detached retina s/p repair  Family/Caregiver Present: No  Prior to Session Communication: Bedside nurse  Patient Position Received: Up in chair, Alarm on  Preferred Learning Style: auditory, verbal  General Comment: telemetry  Precautions:  Medical Precautions: Fall precautions  Precautions Comment: safety precautions    Pain Assessment:  Pain Assessment  Pain Assessment: 0-10  0-10 (Numeric) Pain Score: 0 - No pain    Objective   Cognition:  Overall Cognitive Status: Within Functional Limits  Arousal/Alertness: Appropriate responses to stimuli  Orientation Level: Oriented X4  Activities of Daily Living: Grooming  Grooming Level of Assistance: Close S while standing up at the sink for oral hygiene care.  Beau fatima ssist the pt. with washing her hair while standing up at the sink with pt. utilizing B UE support.  Grooming Where Assessed: Standing sinkside    UE Bathing  UE Bathing Level of Assistance: Setup  UE Bathing Where Assessed: chair up at the sink    LE Bathing  LE Bathing Level of Assistance: Close SBA & cues with set up.  Pt. bathed her distal LE's with Close SBA & cues for use of the foot to knee positioning technique.  CGA/Close SBA & cues when standing to perform perineal hygiene care with cues for one UE for support.  LE Bathing Where Assessed: chair & standing sinkside    UE Dressing  UE Dressing Level of Assistance: [facility]  [facility]  UE Dressing Where Assessed: Chair    LE Dressing  LE Dressing: Yes  Pants Level of Assistance: Close S for clothing intiation over her distal LE's.  CGA/Close SBA & cues when managing clothing over her hips.  Sock Level of Assistance:  Close S set up with use of the foot to knee technique  Adult Briefs Level of Assistance: CGA/Close SBA & cues for the stand phase of dressing.  LE Dressing Where Assessed: Chair    Toileting  Toileting Level of Assistance: CGA/Close SBA & cues when standing to perform perineal hygiene care & clothing management over her hips at FWW level.  Where Assessed: Toilet    Bed Mobility/Transfers: Transfer 1  Technique 1: Sit to stand, Stand to sit  Transfer Device 1: Walker, Gait belt  Transfer Level of Assistance 1: Close supervision & cues  Transfers 2  Transfer From 2: To the bedside chair  Technique 2: Stand pivot  Transfer Device 2: Walker, Gait belt  Transfer Level of Assistance 2: Contact guard & cues    Toilet Transfers  Toilet Transfer Technique: Stand pivot  Toilet Transfers: Contact guard & cues with a FWW & grab bar use    Functional Mobility:  Functional Mobility  Functional Mobility Performed: Yes  Functional Mobility 1  Surface 1: Level tile  Device 1: Rolling walker  Assistance 1: CGA for functional mobility to & from the bathroom    Outcome Measures:  [facility] Daily Activity  Putting on and taking off regular lower body clothing: A little  Bathing (including washing, rinsing, drying): A little  Putting on and taking off regular upper body clothing: A little  Toileting, which includes using toilet, bedpan or urinal: A little  Taking care of personal grooming such as brushing teeth: A little  Eating Meals: None  Daily Activity - Total Score: 19        Education Documentation  Body Mechanics, taught by Marcia Schwarz OT at 6/7/2025  3:48 PM.  Learner: Patient  Readiness: Acceptance  Method: Explanation, Demonstration  Response: Demonstrated Understanding, Verbalizes  Understanding, Needs Reinforcement  Comment: Edcuation on energy conservation, safety with transfers, safety with balance & compensation techniques for self care.    ADL Training, taught by Marcia Schwarz OT at 6/7/2025  3:48 PM.  Learner: Patient  Readiness: Acceptance  Method: Explanation, Demonstration  Response: Demonstrated Understanding, Verbalizes Understanding, Needs Reinforcement  Comment: Edcuation on energy conservation, safety with transfers, safety with balance & compensation techniques for self care.    Goals:  Encounter Problems       Encounter Problems (Active)       ADLs       Patient will perform UB and LB bathing with stand by assist level of assistance. (Progressing)       Start:  06/02/25    Expected End:  06/16/25            Patient with complete upper body dressing with modified independent level of assistance donning and doffing all UE clothes with PRN adaptive equipment while supported sitting       Start:  06/02/25    Expected End:  06/16/25            Patient with complete lower body dressing with modified independent level of assistance donning and doffing all LE clothes  with PRN adaptive equipment while supported sitting (Progressing)       Start:  06/02/25    Expected End:  06/16/25            Patient will complete daily grooming tasks brushing teeth and washing face/hair with modified independent level of assistance and PRN adaptive equipment while standing. (Progressing)       Start:  06/02/25    Expected End:  06/16/25            Patient will complete toileting including hygiene clothing management/hygiene with minimal assist  level of assistance. (Progressing)       Start:  06/02/25    Expected End:  06/16/25               MOBILITY       Patient will perform Functional mobility mod  Household distances/Community Distances with modified independent level of assistance and least restrictive device in order to improve safety and functional mobility. (Progressing)       Start:   06/02/25    Expected End:  06/16/25               TRANSFERS       Patient will complete functional transfer to chair, bed, commode with least restrictive device with modified independent level of assistance. (Progressing)       Start:  06/02/25    Expected End:  06/16/25

## 2025-06-08 VITALS
TEMPERATURE: 98.4 F | DIASTOLIC BLOOD PRESSURE: 69 MMHG | HEART RATE: 79 BPM | WEIGHT: 165.57 LBS | BODY MASS INDEX: 28.27 KG/M2 | RESPIRATION RATE: 16 BRPM | SYSTOLIC BLOOD PRESSURE: 127 MMHG | OXYGEN SATURATION: 93 % | HEIGHT: 64 IN

## 2025-06-08 LAB
ABO GROUP (TYPE) IN BLOOD: NORMAL
ABO GROUP (TYPE) IN BLOOD: NORMAL
ANION GAP SERPL CALC-SCNC: 13 MMOL/L (ref 10–20)
ANTIBODY SCREEN: NORMAL
BACTERIA BLD CULT: NORMAL
BACTERIA BLD CULT: NORMAL
BASOPHILS # BLD AUTO: 0.05 X10*3/UL (ref 0–0.1)
BASOPHILS # BLD AUTO: 0.06 X10*3/UL (ref 0–0.1)
BASOPHILS NFR BLD AUTO: 0.4 %
BASOPHILS NFR BLD AUTO: 0.5 %
BUN SERPL-MCNC: 22 MG/DL (ref 6–23)
CALCIUM SERPL-MCNC: 8.2 MG/DL (ref 8.6–10.3)
CHLORIDE SERPL-SCNC: 95 MMOL/L (ref 98–107)
CO2 SERPL-SCNC: 24 MMOL/L (ref 21–32)
CREAT SERPL-MCNC: 1.07 MG/DL (ref 0.5–1.05)
EGFRCR SERPLBLD CKD-EPI 2021: 54 ML/MIN/1.73M*2
EOSINOPHIL # BLD AUTO: 0.02 X10*3/UL (ref 0–0.4)
EOSINOPHIL # BLD AUTO: 0.04 X10*3/UL (ref 0–0.4)
EOSINOPHIL NFR BLD AUTO: 0.2 %
EOSINOPHIL NFR BLD AUTO: 0.3 %
ERYTHROCYTE [DISTWIDTH] IN BLOOD BY AUTOMATED COUNT: 14.9 % (ref 11.5–14.5)
ERYTHROCYTE [DISTWIDTH] IN BLOOD BY AUTOMATED COUNT: 15.2 % (ref 11.5–14.5)
GLUCOSE BLD MANUAL STRIP-MCNC: 100 MG/DL (ref 74–99)
GLUCOSE BLD MANUAL STRIP-MCNC: 103 MG/DL (ref 74–99)
GLUCOSE BLD MANUAL STRIP-MCNC: 117 MG/DL (ref 74–99)
GLUCOSE BLD MANUAL STRIP-MCNC: 120 MG/DL (ref 74–99)
GLUCOSE SERPL-MCNC: 111 MG/DL (ref 74–99)
HCT VFR BLD AUTO: 19.7 % (ref 36–46)
HCT VFR BLD AUTO: 21.3 % (ref 36–46)
HEMOCCULT SP1 STL QL: NEGATIVE
HGB BLD-MCNC: 6.9 G/DL (ref 12–16)
HGB BLD-MCNC: 7.4 G/DL (ref 12–16)
IMM GRANULOCYTES # BLD AUTO: 0.19 X10*3/UL (ref 0–0.5)
IMM GRANULOCYTES # BLD AUTO: 0.28 X10*3/UL (ref 0–0.5)
IMM GRANULOCYTES NFR BLD AUTO: 1.4 % (ref 0–0.9)
IMM GRANULOCYTES NFR BLD AUTO: 2.1 % (ref 0–0.9)
LYMPHOCYTES # BLD AUTO: 0.87 X10*3/UL (ref 0.8–3)
LYMPHOCYTES # BLD AUTO: 0.96 X10*3/UL (ref 0.8–3)
LYMPHOCYTES NFR BLD AUTO: 6.5 %
LYMPHOCYTES NFR BLD AUTO: 7.3 %
MCH RBC QN AUTO: 30.2 PG (ref 26–34)
MCH RBC QN AUTO: 30.9 PG (ref 26–34)
MCHC RBC AUTO-ENTMCNC: 34.7 G/DL (ref 32–36)
MCHC RBC AUTO-ENTMCNC: 35 G/DL (ref 32–36)
MCV RBC AUTO: 87 FL (ref 80–100)
MCV RBC AUTO: 88 FL (ref 80–100)
MONOCYTES # BLD AUTO: 0.91 X10*3/UL (ref 0.05–0.8)
MONOCYTES # BLD AUTO: 0.94 X10*3/UL (ref 0.05–0.8)
MONOCYTES NFR BLD AUTO: 6.8 %
MONOCYTES NFR BLD AUTO: 7.1 %
NEUTROPHILS # BLD AUTO: 10.97 X10*3/UL (ref 1.6–5.5)
NEUTROPHILS # BLD AUTO: 11.18 X10*3/UL (ref 1.6–5.5)
NEUTROPHILS NFR BLD AUTO: 83.5 %
NEUTROPHILS NFR BLD AUTO: 83.9 %
NRBC BLD-RTO: 0 /100 WBCS (ref 0–0)
NRBC BLD-RTO: ABNORMAL /100{WBCS}
PLATELET # BLD AUTO: 489 X10*3/UL (ref 150–450)
PLATELET # BLD AUTO: 491 X10*3/UL (ref 150–450)
POTASSIUM SERPL-SCNC: 3.6 MMOL/L (ref 3.5–5.3)
RBC # BLD AUTO: 2.23 X10*6/UL (ref 4–5.2)
RBC # BLD AUTO: 2.45 X10*6/UL (ref 4–5.2)
RBC MORPH BLD: NORMAL
RH FACTOR (ANTIGEN D): NORMAL
RH FACTOR (ANTIGEN D): NORMAL
SCHISTOCYTES BLD QL SMEAR: NORMAL
SODIUM SERPL-SCNC: 128 MMOL/L (ref 136–145)
WBC # BLD AUTO: 13.2 X10*3/UL (ref 4.4–11.3)
WBC # BLD AUTO: 13.3 X10*3/UL (ref 4.4–11.3)

## 2025-06-08 PROCEDURE — 2500000001 HC RX 250 WO HCPCS SELF ADMINISTERED DRUGS (ALT 637 FOR MEDICARE OP): Mod: IPSPLIT | Performed by: HOSPITALIST

## 2025-06-08 PROCEDURE — 80048 BASIC METABOLIC PNL TOTAL CA: CPT | Mod: IPSPLIT | Performed by: NURSE PRACTITIONER

## 2025-06-08 PROCEDURE — 1100000001 HC PRIVATE ROOM DAILY: Mod: IPSPLIT

## 2025-06-08 PROCEDURE — 86905 BLOOD TYPING RBC ANTIGENS: CPT | Mod: IPSPLIT

## 2025-06-08 PROCEDURE — 2500000001 HC RX 250 WO HCPCS SELF ADMINISTERED DRUGS (ALT 637 FOR MEDICARE OP): Mod: IPSPLIT | Performed by: NURSE PRACTITIONER

## 2025-06-08 PROCEDURE — 94760 N-INVAS EAR/PLS OXIMETRY 1: CPT | Mod: IPSPLIT

## 2025-06-08 PROCEDURE — 36415 COLL VENOUS BLD VENIPUNCTURE: CPT | Mod: IPSPLIT | Performed by: NURSE PRACTITIONER

## 2025-06-08 PROCEDURE — 82947 ASSAY GLUCOSE BLOOD QUANT: CPT | Mod: IPSPLIT

## 2025-06-08 PROCEDURE — 85025 COMPLETE CBC W/AUTO DIFF WBC: CPT | Mod: IPSPLIT | Performed by: NURSE PRACTITIONER

## 2025-06-08 PROCEDURE — 2500000004 HC RX 250 GENERAL PHARMACY W/ HCPCS (ALT 636 FOR OP/ED): Mod: IPSPLIT | Performed by: HOSPITALIST

## 2025-06-08 PROCEDURE — 86901 BLOOD TYPING SEROLOGIC RH(D): CPT | Mod: IPSPLIT | Performed by: NURSE PRACTITIONER

## 2025-06-08 PROCEDURE — 2500000001 HC RX 250 WO HCPCS SELF ADMINISTERED DRUGS (ALT 637 FOR MEDICARE OP): Mod: IPSPLIT | Performed by: INTERNAL MEDICINE

## 2025-06-08 PROCEDURE — 99232 SBSQ HOSP IP/OBS MODERATE 35: CPT | Performed by: NURSE PRACTITIONER

## 2025-06-08 PROCEDURE — 86900 BLOOD TYPING SEROLOGIC ABO: CPT | Mod: IPSPLIT | Performed by: NURSE PRACTITIONER

## 2025-06-08 PROCEDURE — 82270 OCCULT BLOOD FECES: CPT | Mod: IPSPLIT | Performed by: NURSE PRACTITIONER

## 2025-06-08 PROCEDURE — 86870 RBC ANTIBODY IDENTIFICATION: CPT | Mod: IPSPLIT

## 2025-06-08 RX ORDER — SIMETHICONE 80 MG
40 TABLET,CHEWABLE ORAL ONCE
Status: COMPLETED | OUTPATIENT
Start: 2025-06-08 | End: 2025-06-08

## 2025-06-08 RX ADMIN — SIMETHICONE 40 MG: 80 TABLET, CHEWABLE ORAL at 10:58

## 2025-06-08 RX ADMIN — NYSTATIN 500000 UNITS: 100000 SUSPENSION ORAL at 16:22

## 2025-06-08 RX ADMIN — NYSTATIN 500000 UNITS: 100000 SUSPENSION ORAL at 06:23

## 2025-06-08 RX ADMIN — NYSTATIN 500000 UNITS: 100000 SUSPENSION ORAL at 11:31

## 2025-06-08 RX ADMIN — AMLODIPINE BESYLATE 10 MG: 10 TABLET ORAL at 10:58

## 2025-06-08 RX ADMIN — SODIUM BICARBONATE 650 MG: 650 TABLET ORAL at 20:35

## 2025-06-08 RX ADMIN — Medication 25 MCG: at 10:58

## 2025-06-08 RX ADMIN — Medication 1 TABLET: at 10:58

## 2025-06-08 RX ADMIN — NYSTATIN 500000 UNITS: 100000 SUSPENSION ORAL at 20:34

## 2025-06-08 RX ADMIN — AMOXICILLIN AND CLAVULANATE POTASSIUM 1 TABLET: 875; 125 TABLET, FILM COATED ORAL at 10:58

## 2025-06-08 RX ADMIN — SODIUM BICARBONATE 650 MG: 650 TABLET ORAL at 10:58

## 2025-06-08 RX ADMIN — CALCIUM CARBONATE (ANTACID) CHEW TAB 500 MG 1 TABLET: 500 CHEW TAB at 15:07

## 2025-06-08 RX ADMIN — CALCIUM CARBONATE (ANTACID) CHEW TAB 500 MG 1 TABLET: 500 CHEW TAB at 20:35

## 2025-06-08 RX ADMIN — OXYCODONE 5 MG: 5 TABLET ORAL at 15:07

## 2025-06-08 RX ADMIN — CALCIUM CARBONATE (ANTACID) CHEW TAB 500 MG 1 TABLET: 500 CHEW TAB at 10:58

## 2025-06-08 RX ADMIN — SENNOSIDES AND DOCUSATE SODIUM 2 TABLET: 50; 8.6 TABLET ORAL at 10:58

## 2025-06-08 RX ADMIN — SODIUM BICARBONATE 650 MG: 650 TABLET ORAL at 15:07

## 2025-06-08 RX ADMIN — OXYCODONE HYDROCHLORIDE AND ACETAMINOPHEN 500 MG: 500 TABLET ORAL at 10:58

## 2025-06-08 RX ADMIN — METOPROLOL SUCCINATE 100 MG: 100 TABLET, EXTENDED RELEASE ORAL at 10:58

## 2025-06-08 RX ADMIN — PANTOPRAZOLE SODIUM 40 MG: 40 TABLET, DELAYED RELEASE ORAL at 06:23

## 2025-06-08 RX ADMIN — AMOXICILLIN AND CLAVULANATE POTASSIUM 1 TABLET: 875; 125 TABLET, FILM COATED ORAL at 20:34

## 2025-06-08 ASSESSMENT — PAIN - FUNCTIONAL ASSESSMENT
PAIN_FUNCTIONAL_ASSESSMENT: 0-10
PAIN_FUNCTIONAL_ASSESSMENT: 0-10

## 2025-06-08 ASSESSMENT — PAIN DESCRIPTION - ORIENTATION: ORIENTATION: LOWER

## 2025-06-08 ASSESSMENT — PAIN SCALES - GENERAL
PAINLEVEL_OUTOF10: 0 - NO PAIN
PAINLEVEL_OUTOF10: 0 - NO PAIN
PAINLEVEL_OUTOF10: 6

## 2025-06-08 ASSESSMENT — PAIN DESCRIPTION - LOCATION: LOCATION: BACK

## 2025-06-08 NOTE — NURSING NOTE
Vss. Patient c/o lower back pain, administered oxycodone per order. HCG 6.9 am draw. Collected stool sample, sent to lab for occult stool, negative results. Redraw H & H, hcg 7.4.

## 2025-06-08 NOTE — PROGRESS NOTES
"Mireya Nava is a 74 y.o. female on day 9 of admission presenting with Generalized weakness.      Subjective   \"Everything is flowing - my kids are so mad at me for waiting so long\"   Denies constipation, n/v - admits difficulty with eating due to thrush   Overall feeling better. States she has been getting up to use the bathroom frequently. Complains of swell in her ankles and feet. She complains of feeling bloated.        Objective     Last Recorded Vitals  /66 (BP Location: Right arm, Patient Position: Lying)   Pulse 87   Temp 36.6 °C (97.9 °F) (Temporal)   Resp 16   Wt 75.1 kg (165 lb 9.1 oz)   SpO2 91%   Intake/Output last 3 Shifts:  No intake or output data in the 24 hours ending 06/08/25 0922      Admission Weight  Weight: 65.8 kg (145 lb) (05/30/25 1036)    Daily Weight  06/08/25 : 75.1 kg (165 lb 9.1 oz)    Image Results  ECG 12 lead  Atrial fibrillation  Cannot rule out Anterior infarct , age undetermined  Abnormal ECG  When compared with ECG of 02-JUN-2025 11:27, (unconfirmed)  Atrial fibrillation has replaced Sinus rhythm  XR chest 1 view  Narrative: Interpreted By:  Aaron Hanna,   STUDY:  XR CHEST 1 VIEW;  6/4/2025 10:51 am      INDICATION:  Signs/Symptoms:per nuclear request.          COMPARISON:  Chest x-ray 06/01/2025      ACCESSION NUMBER(S):  [id number]      ORDERING CLINICIAN:  ALEXA PROCTOR      FINDINGS:  AP radiograph of the chest was provided.              CARDIOMEDIASTINAL SILHOUETTE:  Cardiomediastinal silhouette is normal in size and configuration.      LUNGS:  Lungs are clear.      ABDOMEN:  No remarkable upper abdominal findings.      BONES:  No acute osseous changes.      Impression: 1.  No evidence of acute cardiopulmonary process.              MACRO:  None      Signed by: Aaron Hanna 6/4/2025 4:09 PM  Dictation workstation:   [id number]  NM Lung perfusion with spect  Narrative: Interpreted By:  Floyd Donnelly and Korakavi Nisha   STUDY:  NM LUNG " PERFUSION WITH SPECT;  6/4/2025 9:22 am      INDICATION:  Signs/Symptoms:elevated d dimer.          COMPARISON:  None.      ACCESSION NUMBER(S):  [id number]      ORDERING CLINICIAN:  LUKE YEUNG      TECHNIQUE:  DIVISION OF NUCLEAR MEDICINE  VENTILATION/PERFUSION LUNG SCANS      Perfusion images of the lungs were then acquired after the  intravenous administration of  4.3 mCi of Tc-99m macroaggregated  albumin (MAA). Additionally, SPECT images were obtained.      FINDINGS:  Perfusion images demonstrate a normal distribution of  radiopharmaceutical throughout the lung fields.  There are no  segmental or subsegmental perfusion abnormalities.      Impression: Normal perfusion lung scan with no evidence of segmental or  subsegmental perfusion abnormality.      I personally reviewed the images/study and I agree with the findings  as stated by Resident Dr. Vane Renteria MD. This study was  interpreted at [place], [place].      MACRO:  None      Signed by: Floyd Donnelly 6/4/2025 11:18 AM  Dictation workstation:   [id number]      Physical Exam  Constitutional:       Appearance: Normal appearance.   HENT:      Head: Normocephalic.      Nose: Nose normal.      Mouth/Throat:      Mouth: Mucous membranes are moist.   Eyes:      Extraocular Movements: Extraocular movements intact.   Cardiovascular:      Rate and Rhythm: Normal rate. Rhythm irregular.      Pulses: Normal pulses.      Heart sounds: Normal heart sounds.   Pulmonary:      Effort: Pulmonary effort is normal.      Breath sounds: Normal breath sounds.   Abdominal:      General: Bowel sounds are normal.      Palpations: Abdomen is soft.   Musculoskeletal:         General: Normal range of motion.      Cervical back: Normal range of motion.   Skin:     General: Skin is warm and dry.      Capillary Refill: Capillary refill takes less than 2 seconds.      Comments: Oral thrush present     Neurological:      General:  No focal deficit present.      Mental Status: She is alert and oriented to person, place, and time.   Psychiatric:         Mood and Affect: Mood normal.         Behavior: Behavior normal.         Relevant Results             Scheduled medications  Scheduled Medications[1]  Continuous medications  Continuous Medications[2]  PRN medications  PRN Medications[3]  Results for orders placed or performed during the hospital encounter of 05/30/25 (from the past 24 hours)   POCT GLUCOSE   Result Value Ref Range    POCT Glucose 110 (H) 74 - 99 mg/dL   POCT GLUCOSE   Result Value Ref Range    POCT Glucose 112 (H) 74 - 99 mg/dL   POCT GLUCOSE   Result Value Ref Range    POCT Glucose 135 (H) 74 - 99 mg/dL   Basic Metabolic Panel   Result Value Ref Range    Glucose 111 (H) 74 - 99 mg/dL    Sodium 128 (L) 136 - 145 mmol/L    Potassium 3.6 3.5 - 5.3 mmol/L    Chloride 95 (L) 98 - 107 mmol/L    Bicarbonate 24 21 - 32 mmol/L    Anion Gap 13 10 - 20 mmol/L    Urea Nitrogen 22 6 - 23 mg/dL    Creatinine 1.07 (H) 0.50 - 1.05 mg/dL    eGFR 54 (L) >60 mL/min/1.73m*2    Calcium 8.2 (L) 8.6 - 10.3 mg/dL   CBC and Auto Differential   Result Value Ref Range    WBC 13.2 (H) 4.4 - 11.3 x10*3/uL    nRBC      RBC 2.23 (L) 4.00 - 5.20 x10*6/uL    Hemoglobin 6.9 (L) 12.0 - 16.0 g/dL    Hematocrit 19.7 (L) 36.0 - 46.0 %    MCV 88 80 - 100 fL    MCH 30.9 26.0 - 34.0 pg    MCHC 35.0 32.0 - 36.0 g/dL    RDW 14.9 (H) 11.5 - 14.5 %    Platelets 491 (H) 150 - 450 x10*3/uL    Neutrophils % 83.5 40.0 - 80.0 %    Immature Granulocytes %, Automated 1.4 (H) 0.0 - 0.9 %    Lymphocytes % 7.3 13.0 - 44.0 %    Monocytes % 7.1 2.0 - 10.0 %    Eosinophils % 0.3 0.0 - 6.0 %    Basophils % 0.4 0.0 - 2.0 %    Neutrophils Absolute 10.97 (H) 1.60 - 5.50 x10*3/uL    Immature Granulocytes Absolute, Automated 0.19 0.00 - 0.50 x10*3/uL    Lymphocytes Absolute 0.96 0.80 - 3.00 x10*3/uL    Monocytes Absolute 0.94 (H) 0.05 - 0.80 x10*3/uL    Eosinophils Absolute 0.04 0.00 -  0.40 x10*3/uL    Basophils Absolute 0.05 0.00 - 0.10 x10*3/uL   Morphology   Result Value Ref Range    RBC Morphology See Below     RBC Fragments Few    POCT GLUCOSE   Result Value Ref Range    POCT Glucose 120 (H) 74 - 99 mg/dL     ECG 12 lead  Result Date: 6/4/2025  Atrial fibrillation Cannot rule out Anterior infarct , age undetermined Abnormal ECG When compared with ECG of 02-JUN-2025 11:27, (unconfirmed) Atrial fibrillation has replaced Sinus rhythm    XR chest 1 view  Result Date: 6/4/2025  Interpreted By:  Aaron Hanna, STUDY: XR CHEST 1 VIEW;  6/4/2025 10:51 am   INDICATION: Signs/Symptoms:per nuclear request.     COMPARISON: Chest x-ray 06/01/2025   ACCESSION NUMBER(S): [id number]   ORDERING CLINICIAN: ALEXA PROCTOR   FINDINGS: AP radiograph of the chest was provided.       CARDIOMEDIASTINAL SILHOUETTE: Cardiomediastinal silhouette is normal in size and configuration.   LUNGS: Lungs are clear.   ABDOMEN: No remarkable upper abdominal findings.   BONES: No acute osseous changes.       1.  No evidence of acute cardiopulmonary process.       MACRO: None   Signed by: Aaron Hanna 6/4/2025 4:09 PM Dictation workstation:   [id number]    NM Lung perfusion with spect  Result Date: 6/4/2025  Interpreted By:  Flyod Donnelly and Korakavi Nisha STUDY: NM LUNG PERFUSION WITH SPECT;  6/4/2025 9:22 am   INDICATION: Signs/Symptoms:elevated d dimer.     COMPARISON: None.   ACCESSION NUMBER(S): [id number]   ORDERING CLINICIAN: LUKE YEUNG   TECHNIQUE: DIVISION OF NUCLEAR MEDICINE VENTILATION/PERFUSION LUNG SCANS   Perfusion images of the lungs were then acquired after the intravenous administration of  4.3 mCi of Tc-99m macroaggregated albumin (MAA). Additionally, SPECT images were obtained.   FINDINGS: Perfusion images demonstrate a normal distribution of radiopharmaceutical throughout the lung fields.  There are no segmental or subsegmental perfusion abnormalities.       Normal perfusion lung scan  with no evidence of segmental or subsegmental perfusion abnormality.   I personally reviewed the images/study and I agree with the findings as stated by Resident Dr. Vane Renteria MD. This study was interpreted at [place], [place].   MACRO: None   Signed by: Floyd Donnelly 6/4/2025 11:18 AM Dictation workstation:   [id number]    Transthoracic Echo Complete  Result Date: 6/2/2025   [facility], [address] [place] [id number]              Tel [phone] and Fax [phone] TRANSTHORACIC ECHOCARDIOGRAM REPORT  Patient Name:       FANNY TIJERINA PHONG   Reading Physician:    [id number] Rosanna Arevalo MD Study Date:         6/2/2025            Ordering Provider:    [id number] ELICEO WONG MRN/PID:            [id number]            Fellow: Accession#:         [id number]        Nurse:                Bev Luna RN Date of Birth/Age:  1950 / 74      Sonographer:          Aria Dixon RDCS                     years Gender assigned at  F                   Additional Staff: Birth: Height:             162.56 cm           Admit Date: Weight:             78.93 kg            Admission Status:     Inpatient -                                                               Routine BSA / BMI:          1.84 m2 / 29.87     Encounter#:           [id number]                     kg/m2 Blood Pressure:     108/61 mmHg         Department Location:  [facility] Study Type:    TRANSTHORACIC ECHO (TTE) COMPLETE Diagnosis/ICD: Unspecified atrial fibrillation-I48.91 Indication:    AFib CPT Code:      Echo Complete w Full Doppler-[id number] Patient History: Pertinent History: A-Fib, HTN, Hyperlipidemia and Murmur. Weakness. Study Detail: The following Echo studies were performed: 2D, M-Mode, Doppler and               color flow. Agitated saline used as a contrast  agent for               intraseptal flow evaluation. The patient was awake.  PHYSICIAN INTERPRETATION: Left Ventricle: Left ventricular ejection fraction is normal by visual estimate at 65-70%. There are no regional left ventricular wall motion abnormalities. The left ventricular cavity size is normal. There is mildly increased septal and mildly increased posterior left ventricular wall thickness. There is left ventricular concentric remodeling. Spectral Doppler shows a normal pattern of left ventricular diastolic filling. Left Atrium: The left atrium is mildly dilated. The patent foramen ovale was visualized using agitated saline contrast. A bubble study using agitated saline was performed. Bubble study is positive. A large PFO (> 20 bubbles) was demonstrated. Right Ventricle: The right ventricle is mildly enlarged. There is normal right ventricular global systolic function. Right Atrium: The right atrium is mildly dilated. Aortic Valve: There is mild aortic valve regurgitation. Mitral Valve: The mitral valve is normal in structure. There is moderate mitral valve regurgitation. The E Vmax is 1.02 m/s. Tricuspid Valve: The tricuspid valve is structurally normal. There is moderate to severe tricuspid regurgitation. The doppler estimated RVSP is within normal limits with a right ventricular systolic pressure of 30 mmHg. Pulmonic Valve: The pulmonic valve is structurally normal. There is physiologic pulmonic valve regurgitation. Pericardium: There is no pericardial effusion noted. Aorta: The aortic root is normal.  CONCLUSIONS:  1. Left ventricular ejection fraction is normal by visual estimate at 65-70%.  2. There is normal right ventricular global systolic function.  3. Mildly enlarged right ventricle.  4. The left atrium is mildly dilated.  5. Moderate mitral valve regurgitation.  6. Moderate to severe tricuspid regurgitation visualized.  7. The doppler estimated RVSP is within normal limits with a right ventricular  systolic pressure of 30 mmHg.  8. Mild aortic valve regurgitation.  9. A bubble study using agitated saline was performed. Bubble study is positive. A large PFO (> 20 bubbles) was demonstrated. QUANTITATIVE DATA SUMMARY:  2D MEASUREMENTS:          Normal Ranges: Ao Root d:       3.20 cm  (2.0-3.7cm) LAs:             3.90 cm  (2.7-4.0cm) IVSd:            1.20 cm  (0.6-1.1cm) LVPWd:           1.19 cm  (0.6-1.1cm) LVIDd:           3.69 cm  (3.9-5.9cm) LVIDs:           2.39 cm LV Mass Index:   79 g/m2 LVEDV Index:     44 ml/m2 LV % FS          35.2 %  LEFT ATRIUM:                  Normal Ranges: LA Vol A4C:        41.6 ml    (22+/-6mL/m2) LA Vol A2C:        69.7 ml LA Vol BP:         61.0 ml LA Vol Index A4C:  22.6ml/m2 LA Vol Index A2C:  37.8 ml/m2 LA Vol Index BP:   33.1 ml/m2 LA Area A4C:       15.0 cm2 LA Area A2C:       22.0 cm2 LA Major Axis A4C: 4.6 cm LA Major Axis A2C: 5.9 cm  RIGHT ATRIUM:                 Normal Ranges: RA Vol A4C:        31.9 ml    (8.3-19.5ml) RA Vol Index A4C:  17.3 ml/m2 RA Area A4C:       15.0 cm2 RA Major Axis A4C: 6.0 cm  AORTA MEASUREMENTS:         Normal Ranges: Asc Ao, d:          3.50 cm (2.1-3.4cm)  LV SYSTOLIC FUNCTION:                      Normal Ranges: EF-A4C View:    71 % (>=55%) EF-A2C View:    71 % EF-Biplane:     72 % EF-Visual:      68 % LV EF Reported: 68 %  LV DIASTOLIC FUNCTION:            Normal Ranges: MV Peak E:             1.02 m/s   (0.7-1.2 m/s) MV e'                  0.151 m/s  (>8.0) MV lateral e'          0.18 m/s MV medial e'           0.13 m/s E/e' Ratio:            6.75       (<8.0) PulmV Sys Danny:         38.10 cm/s PulmV Godfrey Danny:        46.90 cm/s PulmV S/D Danny:         0.80  MITRAL VALVE:          Normal Ranges: MV DT:        213 msec (150-240msec)  AORTIC VALVE:            Normal Ranges: AoV Vmax:      1.80 m/s  (<=1.7m/s) AoV Peak P.0 mmHg (<20mmHg) LVOT Max Danny:  1.12 m/s  (<=1.1m/s) LVOT VTI:      19.80 cm LVOT Diameter: 2.00 cm   (1.8-2.4cm)  AoV Area,Vmax: 1.95 cm2  (2.5-4.5cm2)  AORTIC INSUFFICIENCY: AI Vmax:       3.18 m/s AI Half-time:  615 msec AI Decel Rate: 152.00 cm/s2  RIGHT VENTRICLE: RV Basal 4.70 cm RV Mid   3.90 cm RV Major 7.9 cm TAPSE:   24.4 mm RV s'    0.13 m/s  TRICUSPID VALVE/RVSP:          Normal Ranges: Peak TR Velocity:     2.58 m/s Est. RA Pressure:     3 RV Syst Pressure:     30       (< 30mmHg) IVC Diam:             1.48 cm  PULMONIC VALVE:          Normal Ranges: PV Max Danny:     1.3 m/s  (0.6-0.9m/s) PV Max P.4 mmHg  PULMONARY VEINS: PulmV Godfrey Danny: 46.90 cm/s PulmV S/D Danny:  0.80 PulmV Sys Danny:  38.10 cm/s  [id number] Rosanna Arevalo MD Electronically signed on 2025 at 9:27:59 PM  ** Final **     XR abdomen 1 view  Result Date: 2025  Interpreted By:  Aaron Hanna, STUDY: XR ABDOMEN 1 VIEW;  2025 4:19 pm   INDICATION: Signs/Symptoms:n/v.     COMPARISON: CT chest abdomen and pelvis 2020   ACCESSION NUMBER(S): [id number]   ORDERING CLINICIAN: LUKE YEUNG   FINDINGS: Nonobstructive bowel gas pattern. Limited evaluation of pneumoperitoneum on supine imaging, however no gross evidence of free air is noted.   Visualized lungs are clear.   Osseous structures demonstrate no acute bony changes.       1.  Unremarkable exam.   MACRO: None   Signed by: Aaron Hanna 2025 4:30 PM Dictation workstation:   [id number]    Electrocardiogram, 12-lead PRN ACS symptoms  Result Date: 2025  Sinus rhythm with Premature atrial complexes Otherwise normal ECG When compared with ECG of 30-MAY-2025 11:11, (unconfirmed) Sinus rhythm has replaced Atrial fibrillation    ECG 12 lead  Result Date: 2025  Atrial fibrillation Abnormal ECG When compared with ECG of 27-DEC-2005 11:33, Atrial fibrillation has replaced Sinus rhythm Vent. rate has increased BY  31 BPM Nonspecific T wave abnormality now evident in Anterior leads    XR chest 1 view  Result Date: 2025  Interpreted By:  Myrtle Duque, STUDY:  XR CHEST 1 VIEW;  6/1/2025 1:37 am   INDICATION: Signs/Symptoms:change in breathing     COMPARISON: CT chest, abdomen, pelvis 05/30/2025.   ACCESSION NUMBER(S): [id number]   ORDERING CLINICIAN: ALEXA PROCTOR   TECHNIQUE: Portable upright frontal view of the chest was obtained .   FINDINGS: Monitoring leads are overlying the patient.   The heart is enlarged. There is mild interstitial edema.   There is a small left pleural effusion with airspace opacity at the left lung base. No pneumothorax.       1.  Cardiomegaly. Mild interstitial edema. Small left pleural effusion with airspace opacity at the left lung base, may be secondary to atelectasis or pneumonia.       MACRO: None.   Signed by: Myrtle Duque 6/1/2025 3:24 AM Dictation workstation:   [id number]    CT chest abdomen pelvis wo IV contrast  Result Date: 5/30/2025  Interpreted By:  Shanel Valencia, STUDY: CT CHEST ABDOMEN PELVIS WO CONTRAST;  5/30/2025 12:50 pm   INDICATION: Signs/Symptoms:cough, abd pain.   COMPARISON: None.   ACCESSION NUMBER(S): [id number]   ORDERING CLINICIAN: DARA WELCH   TECHNIQUE: CT of the chest, abdomen, and pelvis was performed without intravenous contrast.   FINDINGS:   CHEST:   Lines/Devices: None   Lungs: The trachea and central airways are patent without endobronchial lesions. There is a small left pleural effusion with adjacent compressive atelectasis. No focal consolidation, pulmonary edema, pneumothorax or suspicious nodule.   Vasculature: Mild dilation of the thoracic aorta in the ascending segment measuring 4 cm. The main pulmonary artery is normal in size. Mild coronary artery calcifications noted in the LAD.   Heart: Heart size is normal. No pericardial effusion.   Mediastinum and lyudmila: No pathologically enlarged thoracic lymphadenopathy. The esophagus is unremarkable.   Chest wall and lower neck: No significant chest wall soft tissue abnormalities. The visualized thyroid is normal.   ABDOMEN/PELVIS:   Liver: No  mass. Hepatomegaly measuring 20 cm in the craniocaudal dimension.   Biliary: No intrahepatic or extrahepatic bile duct dilation. The gallbladder is collapsed and limited for evaluation.   Spleen: No mass. No splenomegaly.   Pancreas: No mass, main duct dilation or acute inflammation.   Adrenals: Normal.   Kidneys: No calculus or hydronephrosis.   GI tract: There is a small amount of free fluid and fat stranding centered around the cecum. The appendix is not identified. No pericecal free air or organized fluid collection. There are multiple loops of fluid-filled, nondilated small bowel in the pelvis. No bowel obstruction or bowel wall thickening otherwise.   Lymph nodes: No abdominopelvic lymphadenopathy.   Mesentery/peritoneum: No free air or fluid collection. There is mild generalized haziness involving the peritoneum.   Vasculature: Moderate abdominal aortic atherosclerotic calcifications without aneurysmal dilation.   Pelvis: No free air or fluid collection. Trace free fluid in the dependent pelvis. The bladder is moderate distended but otherwise normal-appearing. The uterus appears grossly unremarkable.   Bones/Soft tissues: Mild levocurvature of the lumbar spine with multilevel thoracolumbar degenerative disc disease. Moderate to severe bilateral hip osteoarthritis. Mild abdominal wall edema.         Limited examination without intravenous contrast.   The appendix is not visualized, although right lower quadrant inflammation and free fluid centered around the cecum raise concern for possible acute appendicitis. No organized fluid collection. Please correlate with right lower quadrant tenderness.   Multiple fluid-filled small bowel within the pelvis could represent nonspecific enteritis. No bowel obstruction.   Small left pleural effusion without definite evidence of pneumonia.   4 cm ascending thoracic aortic aneurysm.   DAYTON Valencia discussed the significance and urgency of this critical finding by  Epic secure chat with  DARA RICEBREEZY on 5/30/2025 at 2:03 pm.  (**-RCF-**) Findings:  See findings.   Signed by: Shanel Valencia 5/30/2025 2:05 PM Dictation workstation:   [id number]             Assessment & Plan  Primary hypertension    Hyperlipidemia    Hypo-osmolar hyponatremia    TAMMY (acute kidney injury)    Atrial fibrillation (Multi)    UTI (urinary tract infection)    Acute urinary retention    Severe hypo-osmolar hyponatremia, improving  - Patient is alert and oriented to self, birthday, president and year and appears mentating well.  She does have some generalized weakness but no obvious gross focal neurologic deficits.  - 5/30 serum osmol 251  - TSH 0.58  - Na 109>111>112>114>113>120 > 121>124>127>125>127>128  - cortisol 28.5  - urine Na 22 - consistent with hypovolemic hyponatremia, likely secondary to dehydration  - patient is not on any obvious home medications to cause hyponatremia.  - Sodium was 130 on 5/30/2024.  - 3% hypertonic saline at 15 mL/h for 4 hours x 2  - ICU monitoring  - Goal to achieve a 24-hour increase in serum sodium of 4 to 6 mEq/L.   - seizure precautions.  - Q6 BMP  - appetite at baseline  - continue sodium bicarb 650 mg TID     Acute kidney injury, improving  - Likely secondary to dehydration with decreased oral intake.  - creat 2.68 > 2.88 > 3.25 > 3.21 >3.20>3.08>2.33>1.92>1.74>1.5>1.23>1.07  - urine creat 71.1 > 34.8  - urine sodium 23, Fena calculated showing likely pre-renal cause  - Patient given 1 L of IV fluids in the ER.  As above, will place on hypertonic saline at 15 mL/h for 4 hours and check BMPs every 4 hours (complete)  - 5/31 received D5 .45 overnight - bolus 100ml 3% NS this am and then 0.9NaCl @125ml/hr and monitor q4hr BMP   - CT scan of the abdomen pelvis with no signs of hydronephrosis, powell placed for retention  - Hold home medication of losartan with acute kidney injury.  - daily BMP     Nonspecific enteritis  - Dr. Garner consult, appreciate recs, feels no  surgical need at this time   - tolerating diet  - WBC 19.8>18.8>19.3>19.9>15.1>15.7>15.3  - blood culture: grew E. Coli  - continue Augmentin 875 mg BID  - ID consult     New onset atrial fibrillation  Essential HTN  AAA  - continue metoprolol succinate 100 mg daily, amlodipine 10 mg daily  - hold losartan  - discontinued heparin gtt   - continue apixaban 5 mg BID  - echo: normal LVSF with an EF of 65-70%. Moderate mitral regurgitation. Moderate to severe tricuspid regurgitation. Mild aortic  regurgitation.  9. A bubble study using agitated saline was performed. Bubble study is positive. A large PFO (> 20 bubbles) was demonstrated  - Consult cardiology, recommending outpatient cardioversion after being apixaban for 1 month  - cardiac monitoring via telemetry  - 4 cm ascending thoracic aortic aneurysm found incidentally on CT scan  - Recommend follow-up with primary care provider and cardiothoracic surgery as an outpatient.  - patient is aware of findings   - monitor BP and HR     Acute cystitis with hematuria  Bacteremia  - UA 2+ blood; 3+ leukocytes, > 50 WBC; 4+bact  - urine culture grew E. Coli  - Blood cultures 4/4 positive for E. Coli  - Given ceftriaxone and zosyn  - continue augmentin 875 mg BID x 14 days  - consult ID, appreciate recs      Urinary retention  - Campbell catheter   - strict IO  - void trial today; removed Campbell  - Good urine output 10,105 mL in the past 24hr     Iron Deficiency  Normocytic Anemia  - Hb 7.8/MCV 82>6.9/19.7  - Iron 25; ; % sat 14  - given IV venofer 300 mg once  - started ferrous gluconate 324 mg daily with ascorbic acid 500 mg BID  -type and screen   -patient asymptomatic   -hemoccult      Vitamin D deficiency  - continue cholecalciferol 25 mcg daily     Oral Candidiasis  - continue nystatin 500,000 units QID         DVT ppx:   - discontinued heparin gtt  - hold the apixaban 5 mg BID due to anemia     PUD ppx: with severe anemia & physiological stress - add protonix   Code  Status: Full  Dispo: pt requires overnight stay       Rose Shaw, APRN-CNP       [1] amLODIPine, 10 mg, oral, Daily  amoxicillin-clavulanate, 1 tablet, oral, q12h CARLYLE  apixaban, 5 mg, oral, BID  ascorbic acid, 500 mg, oral, Daily  calcium carbonate, 500 mg of calcium carbonate, oral, TID  cholecalciferol, 25 mcg, oral, Daily  ferrous gluconate, 38 mg of elemental iron, oral, Daily with breakfast  insulin lispro, 0-10 Units, subcutaneous, TID AC  [Held by provider] losartan, 100 mg, oral, Daily  metoprolol succinate XL, 100 mg, oral, Daily  nystatin, 5 mL, Swish & Spit, 4x daily  pantoprazole, 40 mg, oral, Daily before breakfast  sennosides-docusate sodium, 2 tablet, oral, BID  simethicone, 40 mg, oral, Once  sodium bicarbonate, 650 mg, oral, TID     [2] sodium chloride 0.9%, 10 mL/hr     [3] PRN medications: acetaminophen **OR** [DISCONTINUED] acetaminophen **OR** [DISCONTINUED] acetaminophen, alum-mag hydroxide-simeth, calcium carbonate, dextromethorphan-guaifenesin, ipratropium-albuteroL, magnesium hydroxide, oxyCODONE, oxygen, sodium chloride 0.9%

## 2025-06-09 VITALS
DIASTOLIC BLOOD PRESSURE: 65 MMHG | BODY MASS INDEX: 27.81 KG/M2 | SYSTOLIC BLOOD PRESSURE: 121 MMHG | RESPIRATION RATE: 18 BRPM | TEMPERATURE: 97.5 F | HEART RATE: 101 BPM | HEIGHT: 64 IN | WEIGHT: 162.92 LBS | OXYGEN SATURATION: 95 %

## 2025-06-09 LAB
ANION GAP SERPL CALC-SCNC: 14 MMOL/L (ref 10–20)
BASOPHILS # BLD AUTO: 0.1 X10*3/UL (ref 0–0.1)
BASOPHILS NFR BLD AUTO: 0.8 %
BUN SERPL-MCNC: 19 MG/DL (ref 6–23)
CALCIUM SERPL-MCNC: 8.2 MG/DL (ref 8.6–10.3)
CHLORIDE SERPL-SCNC: 95 MMOL/L (ref 98–107)
CO2 SERPL-SCNC: 24 MMOL/L (ref 21–32)
CREAT SERPL-MCNC: 0.98 MG/DL (ref 0.5–1.05)
EGFRCR SERPLBLD CKD-EPI 2021: 60 ML/MIN/1.73M*2
EOSINOPHIL # BLD AUTO: 0.02 X10*3/UL (ref 0–0.4)
EOSINOPHIL NFR BLD AUTO: 0.2 %
ERYTHROCYTE [DISTWIDTH] IN BLOOD BY AUTOMATED COUNT: 15.4 % (ref 11.5–14.5)
GLUCOSE BLD MANUAL STRIP-MCNC: 114 MG/DL (ref 74–99)
GLUCOSE BLD MANUAL STRIP-MCNC: 115 MG/DL (ref 74–99)
GLUCOSE SERPL-MCNC: 102 MG/DL (ref 74–99)
HCT VFR BLD AUTO: 20.1 % (ref 36–46)
HGB BLD-MCNC: 6.9 G/DL (ref 12–16)
IMM GRANULOCYTES # BLD AUTO: 0.13 X10*3/UL (ref 0–0.5)
IMM GRANULOCYTES NFR BLD AUTO: 1 % (ref 0–0.9)
LYMPHOCYTES # BLD AUTO: 0.93 X10*3/UL (ref 0.8–3)
LYMPHOCYTES NFR BLD AUTO: 7.1 %
MCH RBC QN AUTO: 30.1 PG (ref 26–34)
MCHC RBC AUTO-ENTMCNC: 34.3 G/DL (ref 32–36)
MCV RBC AUTO: 88 FL (ref 80–100)
MONOCYTES # BLD AUTO: 1.02 X10*3/UL (ref 0.05–0.8)
MONOCYTES NFR BLD AUTO: 7.8 %
NEUTROPHILS # BLD AUTO: 10.88 X10*3/UL (ref 1.6–5.5)
NEUTROPHILS NFR BLD AUTO: 83.1 %
NRBC BLD-RTO: 0 /100 WBCS (ref 0–0)
PLATELET # BLD AUTO: 544 X10*3/UL (ref 150–450)
POTASSIUM SERPL-SCNC: 3.8 MMOL/L (ref 3.5–5.3)
RBC # BLD AUTO: 2.29 X10*6/UL (ref 4–5.2)
SODIUM SERPL-SCNC: 129 MMOL/L (ref 136–145)
WBC # BLD AUTO: 13.1 X10*3/UL (ref 4.4–11.3)

## 2025-06-09 PROCEDURE — 36415 COLL VENOUS BLD VENIPUNCTURE: CPT | Mod: IPSPLIT | Performed by: NURSE PRACTITIONER

## 2025-06-09 PROCEDURE — 2500000001 HC RX 250 WO HCPCS SELF ADMINISTERED DRUGS (ALT 637 FOR MEDICARE OP): Mod: IPSPLIT | Performed by: HOSPITALIST

## 2025-06-09 PROCEDURE — 2500000001 HC RX 250 WO HCPCS SELF ADMINISTERED DRUGS (ALT 637 FOR MEDICARE OP): Mod: IPSPLIT | Performed by: INTERNAL MEDICINE

## 2025-06-09 PROCEDURE — 97530 THERAPEUTIC ACTIVITIES: CPT | Mod: GO,IPSPLIT | Performed by: OCCUPATIONAL THERAPIST

## 2025-06-09 PROCEDURE — 85025 COMPLETE CBC W/AUTO DIFF WBC: CPT | Mod: IPSPLIT | Performed by: NURSE PRACTITIONER

## 2025-06-09 PROCEDURE — 99239 HOSP IP/OBS DSCHRG MGMT >30: CPT | Performed by: NURSE PRACTITIONER

## 2025-06-09 PROCEDURE — 82947 ASSAY GLUCOSE BLOOD QUANT: CPT | Mod: IPSPLIT

## 2025-06-09 PROCEDURE — 2500000004 HC RX 250 GENERAL PHARMACY W/ HCPCS (ALT 636 FOR OP/ED): Mod: IPSPLIT | Performed by: HOSPITALIST

## 2025-06-09 PROCEDURE — 94760 N-INVAS EAR/PLS OXIMETRY 1: CPT | Mod: IPSPLIT

## 2025-06-09 PROCEDURE — 82374 ASSAY BLOOD CARBON DIOXIDE: CPT | Mod: IPSPLIT | Performed by: NURSE PRACTITIONER

## 2025-06-09 PROCEDURE — 86880 COOMBS TEST DIRECT: CPT | Mod: IPSPLIT

## 2025-06-09 PROCEDURE — 2500000001 HC RX 250 WO HCPCS SELF ADMINISTERED DRUGS (ALT 637 FOR MEDICARE OP): Mod: IPSPLIT | Performed by: NURSE PRACTITIONER

## 2025-06-09 PROCEDURE — 97535 SELF CARE MNGMENT TRAINING: CPT | Mod: GO,IPSPLIT | Performed by: OCCUPATIONAL THERAPIST

## 2025-06-09 RX ORDER — AMOXICILLIN AND CLAVULANATE POTASSIUM 875; 125 MG/1; MG/1
1 TABLET, FILM COATED ORAL EVERY 12 HOURS SCHEDULED
Qty: 9 TABLET | Refills: 0 | Status: SHIPPED | OUTPATIENT
Start: 2025-06-09 | End: 2025-06-14

## 2025-06-09 RX ORDER — FERROUS GLUCONATE 325 MG
38 TABLET ORAL
Qty: 30 TABLET | Refills: 1 | Status: SHIPPED | OUTPATIENT
Start: 2025-06-10 | End: 2025-08-09

## 2025-06-09 RX ORDER — PANTOPRAZOLE SODIUM 40 MG/1
40 TABLET, DELAYED RELEASE ORAL
Qty: 30 TABLET | Refills: 1 | Status: SHIPPED | OUTPATIENT
Start: 2025-06-10 | End: 2025-08-09

## 2025-06-09 RX ORDER — SODIUM BICARBONATE 650 MG/1
650 TABLET ORAL 2 TIMES DAILY
Qty: 2 TABLET | Refills: 14 | Status: SHIPPED | OUTPATIENT
Start: 2025-06-09 | End: 2025-06-24

## 2025-06-09 RX ADMIN — NYSTATIN 500000 UNITS: 100000 SUSPENSION ORAL at 12:44

## 2025-06-09 RX ADMIN — CALCIUM CARBONATE (ANTACID) CHEW TAB 500 MG 1 TABLET: 500 CHEW TAB at 14:03

## 2025-06-09 RX ADMIN — PANTOPRAZOLE SODIUM 40 MG: 40 TABLET, DELAYED RELEASE ORAL at 06:08

## 2025-06-09 RX ADMIN — AMLODIPINE BESYLATE 10 MG: 10 TABLET ORAL at 09:15

## 2025-06-09 RX ADMIN — CALCIUM CARBONATE (ANTACID) CHEW TAB 500 MG 1 TABLET: 500 CHEW TAB at 09:15

## 2025-06-09 RX ADMIN — Medication 25 MCG: at 09:15

## 2025-06-09 RX ADMIN — METOPROLOL SUCCINATE 100 MG: 100 TABLET, EXTENDED RELEASE ORAL at 09:15

## 2025-06-09 RX ADMIN — SENNOSIDES AND DOCUSATE SODIUM 2 TABLET: 50; 8.6 TABLET ORAL at 09:15

## 2025-06-09 RX ADMIN — SODIUM BICARBONATE 650 MG: 650 TABLET ORAL at 14:03

## 2025-06-09 RX ADMIN — NYSTATIN 500000 UNITS: 100000 SUSPENSION ORAL at 06:08

## 2025-06-09 RX ADMIN — OXYCODONE 5 MG: 5 TABLET ORAL at 04:34

## 2025-06-09 RX ADMIN — SODIUM BICARBONATE 650 MG: 650 TABLET ORAL at 09:15

## 2025-06-09 RX ADMIN — OXYCODONE HYDROCHLORIDE AND ACETAMINOPHEN 500 MG: 500 TABLET ORAL at 09:15

## 2025-06-09 RX ADMIN — AMOXICILLIN AND CLAVULANATE POTASSIUM 1 TABLET: 875; 125 TABLET, FILM COATED ORAL at 09:15

## 2025-06-09 RX ADMIN — Medication 1 TABLET: at 09:15

## 2025-06-09 ASSESSMENT — COGNITIVE AND FUNCTIONAL STATUS - GENERAL
DAILY ACTIVITIY SCORE: 20
DRESSING REGULAR UPPER BODY CLOTHING: A LITTLE
EATING MEALS: A LITTLE
STANDING UP FROM CHAIR USING ARMS: A LITTLE
MOBILITY SCORE: 23
DRESSING REGULAR LOWER BODY CLOTHING: A LITTLE
HELP NEEDED FOR BATHING: A LITTLE
DAILY ACTIVITIY SCORE: 18
TOILETING: A LITTLE
HELP NEEDED FOR BATHING: A LITTLE
DRESSING REGULAR LOWER BODY CLOTHING: A LITTLE
TOILETING: A LITTLE
DRESSING REGULAR UPPER BODY CLOTHING: A LITTLE
PERSONAL GROOMING: A LITTLE

## 2025-06-09 ASSESSMENT — ACTIVITIES OF DAILY LIVING (ADL): HOME_MANAGEMENT_TIME_ENTRY: 13

## 2025-06-09 ASSESSMENT — PAIN - FUNCTIONAL ASSESSMENT
PAIN_FUNCTIONAL_ASSESSMENT: 0-10
PAIN_FUNCTIONAL_ASSESSMENT: 0-10

## 2025-06-09 ASSESSMENT — PAIN SCALES - GENERAL
PAINLEVEL_OUTOF10: 0 - NO PAIN

## 2025-06-09 NOTE — PROGRESS NOTES
Occupational Therapy    Occupational Therapy Treatment    Name: Mireya Nava  MRN: [id number]  Department:   Room: 228228-A  Date: 06/09/25  Time Calculation  Start Time: 1043  Stop Time: 1106  Time Calculation (min): 23 min    Assessment:  OT Assessment: Good participation in self-care with therapist. Increased balance, safety awareness and overall navigation with walker improved. Pt. is progressing towards baseline at this time. Continue to recommend OT at MOD intensity to increase independence with occupations to return home at [facility].  Prognosis: Good  Barriers to Discharge Home: Caregiver assistance, Physical needs  Caregiver Assistance: Patient lives alone and/or does not have reliable caregiver assistance  Physical Needs: Stair navigation into home limited by function/safety, Intermittent ADL assistance needed, High falls risk due to function or environment, Intermittent mobility assistance needed  Evaluation/Treatment Tolerance: Patient tolerated treatment well  Medical Staff Made Aware: Yes  End of Session Communication: Bedside nurse  End of Session Patient Position: Up in chair, Alarm on  Plan:  Treatment Interventions: ADL retraining, Functional transfer training, UE strengthening/ROM, Endurance training, Patient/family training, Equipment evaluation/education, Compensatory technique education, Neuromuscular reeducation  OT Frequency: 3 times per week  OT Discharge Recommendations: Moderate intensity level of continued care  Equipment Recommended upon Discharge: Wheeled walker  OT Recommended Transfer Status: Assist of 1, Stand by assist  OT - OK to Discharge: Yes (Based on completed evaluation and care plan recommendations, no barriers to discharge to next site of care.)    Subjective     OT Visit Info:  OT Received On: 06/09/25  General:  General  Reason for Referral: Impaired self care skills & functional transfers due to hospitalization for generalized weakness, sepsis seocdary to E coli bacteremia  & UTI.  Referred By: Jung Esparza DO  Past Medical History Relevant to Rehab: HTN, anemia, detached retina s/p repair  Family/Caregiver Present: No  Prior to Session Communication: Bedside nurse  Patient Position Received: Up in chair, Alarm on  Preferred Learning Style: auditory, verbal  General Comment: Pt. pleasant and cooperative with OT session.  Precautions:  Medical Precautions: Fall precautions  Precautions Comment: pretty goldstein     Date/Time Vitals Session Patient Position Pulse Resp SpO2 BP MAP (mmHg)    06/09/25 1300 --  --  103  --  97 %  --  --     06/09/25 1330 --  --  82  --  97 %  --  --     06/09/25 1400 --  --  76  --  93 %  --  --     06/09/25 1429 --  --  101  18  95 %  121/65  84           Pain Assessment:  Pain Assessment  Pain Assessment: 0-10  0-10 (Numeric) Pain Score: 0 - No pain    Objective   Cognition:  Overall Cognitive Status: Within Functional Limits  Arousal/Alertness: Appropriate responses to stimuli  Orientation Level: Oriented X4  Activities of Daily Living: Grooming  Grooming Level of Assistance: Close supervision  Grooming Where Assessed: Standing sinkside  Grooming Comments: Pt. was able to brush her teeth with close sup. No LOB standing sinkside    LE Dressing  LE Dressing: Yes  Pants Level of Assistance: Close supervision  LE Dressing Where Assessed: Chair  LE Dressing Comments: Pt. displayed difficutly 2/2 socks with donning pants today. Pt. required extra time but was able to complete without cueing from therapist.    Toileting  Toileting Level of Assistance: Close supervision  Where Assessed: Toilet  Toileting Comments: good use of grab bar    Functional Standing Tolerance:  Functional Standing Tolerance  Time: 5 minutes  Activity: grooming  Functional Standing Tolerance Comments: good balance throughout  Bed Mobility/Transfers: Transfers  Transfer: Yes  Transfer 1  Transfer From 1: Chair with arms to  Transfer to 1: Toilet  Technique 1: Sit to stand, Stand to  sit  Transfer Device 1: Walker  Transfer Level of Assistance 1: Close supervision  Transfers 2  Transfer From 2: Toilet to  Transfer to 2: Chair with arms  Technique 2: Sit to stand, Stand to sit  Transfer Device 2: Walker  Transfer Level of Assistance 2: Close supervision  Transfers 3  Transfer From 3: Chair with arms to  Transfer to 3: Sit, Stand  Technique 3: Sit to stand, Stand to sit  Transfer Device 3: Walker, Gait belt  Transfer Level of Assistance 3: Close supervision  Trials/Comments 3: good overall hand placement without cueing from therapist.    Toilet Transfers  Toilet Transfer From: Chair  Toilet Transfer Type: To and from  Toilet Transfer to: Raised toilet seat with rails  Toilet Transfer Technique: Ambulating  Toilet Transfers: Supervision    Functional Mobility:  Functional Mobility  Functional Mobility Performed: Yes  Functional Mobility 1  Surface 1: Level tile  Device 1: Rolling walker  Functional Mobility Support Devices: Gait belt  Assistance 1: Close supervision, Contact guard  Comments 1: amb ~ 100 ft around nurses station, no LOB. Cueing to not push down on walker.  Sitting Balance:  Static Sitting Balance  Static Sitting-Balance Support: Feet supported  Static Sitting-Level of Assistance: Independent  Dynamic Sitting Balance  Dynamic Sitting-Balance Support: Feet supported  Dynamic Sitting-Level of Assistance: Independent  Dynamic Sitting-Balance: Forward lean  Standing Balance:  Static Standing Balance  Static Standing-Balance Support: Bilateral upper extremity supported  Static Standing-Level of Assistance: Close supervision  Dynamic Standing Balance  Dynamic Standing-Balance Support: Bilateral upper extremity supported  Dynamic Standing-Level of Assistance: Close supervision  Dynamic Standing-Balance: Turning, Reaching for objects  Outcome Measures:  [facility] Daily Activity  Putting on and taking off regular lower body clothing: A little  Bathing (including washing, rinsing, drying): A  little  Putting on and taking off regular upper body clothing: A little  Toileting, which includes using toilet, bedpan or urinal: A little  Taking care of personal grooming such as brushing teeth: A little  Eating Meals: A little  Daily Activity - Total Score: 18    Education Documentation  Body Mechanics, taught by Pam Medrano OT at 6/9/2025  2:54 PM.  Learner: Patient  Readiness: Acceptance  Method: Explanation  Response: Verbalizes Understanding  Comment: edu on safety with walker use.    ADL Training, taught by Pam Medrano OT at 6/9/2025  2:54 PM.  Learner: Patient  Readiness: Acceptance  Method: Explanation  Response: Verbalizes Understanding  Comment: edu on safety with walker use.    Education Comments  No comments found.      Goals:  Encounter Problems       Encounter Problems (Active)       ADLs       Patient will perform UB and LB bathing with stand by assist level of assistance. (Progressing)       Start:  06/02/25    Expected End:  06/16/25            Patient with complete upper body dressing with modified independent level of assistance donning and doffing all UE clothes with PRN adaptive equipment while supported sitting       Start:  06/02/25    Expected End:  06/16/25            Patient with complete lower body dressing with modified independent level of assistance donning and doffing all LE clothes  with PRN adaptive equipment while supported sitting (Progressing)       Start:  06/02/25    Expected End:  06/16/25            Patient will complete daily grooming tasks brushing teeth and washing face/hair with modified independent level of assistance and PRN adaptive equipment while standing. (Progressing)       Start:  06/02/25    Expected End:  06/16/25            Patient will complete toileting including hygiene clothing management/hygiene with minimal assist  level of assistance. (Progressing)       Start:  06/02/25    Expected End:  06/16/25               MOBILITY       Patient will perform  Functional mobility mod  Household distances/Community Distances with modified independent level of assistance and least restrictive device in order to improve safety and functional mobility. (Progressing)       Start:  06/02/25    Expected End:  06/16/25               TRANSFERS       Patient will complete functional transfer to chair, bed, commode with least restrictive device with modified independent level of assistance. (Progressing)       Start:  06/02/25    Expected End:  06/16/25

## 2025-06-09 NOTE — PROGRESS NOTES
Physical Therapy                 Therapy Communication Note    Patient Name: Mireya Nava  MRN: [id number]  Department: [facility]  Room: [address] [place], [place] [address]A  Today's Date: 6/9/2025     Discipline: Physical Therapy    Missed Visit: Yes     Missed Visit Reason: Started session with pt, upon performing STS pt felt nausous and began throwing up. Once pt finished throwing up, nurse was notified and pt was informed that we would try again at a later time if she was feeling better.    1435: An attempt was made but pt was leaving.      Missed Time: Attempt

## 2025-06-09 NOTE — PROGRESS NOTES
06/09/25 0934   Discharge Planning   Living Arrangements Alone   Support Systems Children   Assistance Needed Patient lives in the senior mobile home community alone. She has someone cut her grass. Her son or daughter help with things as needed. She is indepednent with ADLs, IADLs, ambulation and drives locally. No DME-no home oxygen. Confirmed address, pharmacy, and PCP is Makenzie Esparza.   Type of Residence Private residence   Number of Stairs to Enter Residence 3   Number of Stairs Within Residence 0   Do you have animals or pets at home? No   Who is requesting discharge planning? Provider   Home or Post Acute Services Post acute facilities (Rehab/SNF/etc)   Type of Post Acute Facility Services Skilled nursing   Expected Discharge Disposition SNF  (Patient will go to [facility] SNF when medically ready. Provider would like to see sodium improve a bit more prior to discharging-currently Na 129. Facility aware. No barriers when medically ready.)   Does the patient need discharge transport arranged? Yes   RoundTrip coordination needed? Yes   Has discharge transport been arranged? No        06/09/25 1100   Discharge Planning   Expected Discharge Disposition SNF  (Pt and daughter aware of and fine with dc today to [facility] for Rehab-IMM obtained by onsite TCC. Transport requested for 1pm stretcher. AVS and GOLDFORM sent to SNF and they were made aware of tr time. Mallory TRAN @ [facility] to complete 7000 and arrange trans)      06/09/25 1138   Discharge Planning   Expected Discharge Disposition SNF  (Transport confirmed for 2:45pm with [facility])

## 2025-06-09 NOTE — CARE PLAN
"The patient's goals for the shift include \"get some sleep\"    The clinical goals for the shift include Pt will tolerate all meals with no N/V    Problem: Safety - Adult  Goal: Free from fall injury  Outcome: Progressing     "

## 2025-06-09 NOTE — NURSING NOTE
1100- Assumed care of pt. Pt in chair with chair alarm on.     1200- pt updated discharge time 1445 to [facility].    1430-Report given to nurse Oconnor at [facility]. Pt discharged to [facility] via transport. IV removed, vitals and telemetry completed.

## 2025-06-09 NOTE — DISCHARGE SUMMARY
"Discharge Diagnosis  Generalized weakness     Discharge Meds     Medication List      START taking these medications     amoxicillin-clavulanate 875-125 mg tablet; Commonly known as: Augmentin;   Take 1 tablet by mouth every 12 hours for 9 doses.   ferrous gluconate 324 mg (38 mg elemental) tablet; Commonly known as:   Fergon; Take 1 tablet by mouth once daily with breakfast.; Start taking   on: Domonique 10, 2025   pantoprazole 40 mg EC tablet; Commonly known as: ProtoNix; Take 1 tablet   (40 mg) by mouth once daily in the morning. Take before meals. Do not   crush, chew, or split.; Start taking on: Domonique 10, 2025   sodium bicarbonate 650 mg tablet; Take 1 tablet (650 mg) by mouth 2   times a day for 15 days.     CONTINUE taking these medications     amLODIPine 10 mg tablet; Commonly known as: Norvasc; Take 1 tablet (10   mg) by mouth once daily.   losartan 100 mg tablet; Commonly known as: Cozaar; Take 1 tablet (100   mg) by mouth once daily.   metoprolol succinate  mg 24 hr tablet; Commonly known as:   Toprol-XL; Take 1 tablet (100 mg) by mouth once daily.   Os-Jovany 500 + D3 500 mg-5 mcg (200 unit) tablet; Generic drug: calcium   carbonate-vitamin D3   Vitamin D3 25 mcg (1,000 units) tablet; Generic drug: cholecalciferol       Test Results Pending At Discharge  Pending Labs       Order Current Status    Extra Urine Gray Tube Collected (05/30/25 1402)    BB ORDER ONLY - Antibody Identification In process    Methylmalonic acid, serum In process    Urinalysis with Reflex Culture and Microscopic In process            Hospital Course   HPI: \"Mireya Nava is a 74 y.o. female with past medical history of hypertension, anemia, detached retina status post repair, who came to the hospital secondary to nausea, vomiting, decreased appetite.  Patient reports 7 days ago, she ate a sandwich with sausage, spinach and since then, she has had nausea and vomiting, decreased appetite.  She reports her last bowel movement was a " "small amount 1 to 2 days ago.  She reports having chills at times.  Patient reports decreased appetite and has not eaten much over the past 1 week.  She reports having some low back pain across her low back.  She states she has not been urinating much.  She denies chest pain, shortness of breath, blood in her urine, black or bloody stools, lower extremity edema.  She reports having a colonoscopy last summer.  She reports having an appendectomy in her 20s.  Patient denies recent sick contacts, camping trips or any trips out of the country.  Patient reports generalized weakness where she states her legs feel heavy.  She denies history of atrial fibrillation and denies having palpitations.  Otherwise, 10 point review of systems is benign.\"    Mireya was admitted to Medicine and treated for hypovolemic hyponatremia, acute kidney injury, enteritis, new atrial fibrillation UTI with retention and iron deficiency anemia.  She also developed oral candidiasis during her inpatient stay.  She was in ICU with close monitoring of her sodium levels and mentation and hyponatremia was treated with 3% hypertonic saline while sodium levels were monitored.  She was then transition to oral sodium bicarb.  Sodium level increased from 109-129 by discharge.  Acute kidney injury resolved with gentle hydration and resumption of normal appetite and intake.  Surgery was consulted for enteritis without intervention, she completed a course of Augmentin orally.  Her atrial fibrillation was treated with IV heparin and she transition to oral apixaban.  Echocardiogram showed EF of 65 to 70% and cardiology was consulted.  They recommended outpatient cardioversion after oral anticoagulation of 1 month.  UTI was treated with ceftriaxone, Zosyn and transition to oral Augmentin to finish a course of 14 days.  Infectious disease consulted as well and gave recommendations for treatment.  Campbell catheter was placed for acute retention and was removed without " complication.  For her iron deficiency and normocytic anemia her labs were closely monitored including iron counts.  She was treated with IV Venofer and oral ferrous gluconate.  Stool occult remained negative and she was asymptomatic of the anemia.  She will have repeat labs after discharge to monitor blood counts.  Apixaban was held due to her significant anemia.  She will follow-up with cardiology as well as PCP on discharge.  Chronic medical conditions were also addressed and monitored. PT/OT evals were done.  Labs were closely monitored.  Mireya was discharged to SNF in stable condition with the above medication changes and/or additions. Recommendations were made to follow up with pt's PCP in 1-2 weeks. See full inpatient plan below.     Problem list:  Severe hypo-osmolar hyponatremia, improving  - Patient is alert and oriented to self, birthday, president and year and appears mentating well.  She does have some generalized weakness but no obvious gross focal neurologic deficits.  - 5/30 serum osmol 251  - TSH 0.58  - Na 109>111>112>114>113>120 > 121>124>127>125>127>128  - cortisol 28.5  - urine Na 22 - consistent with hypovolemic hyponatremia, likely secondary to dehydration  - patient is not on any obvious home medications to cause hyponatremia.  - Sodium was 130 on 5/30/2024.  - 3% hypertonic saline at 15 mL/h for 4 hours x 2  - ICU monitoring  - Goal to achieve a 24-hour increase in serum sodium of 4 to 6 mEq/L.   - seizure precautions.  - Q6 BMP  - appetite at baseline  - continue sodium bicarb 650 mg TID  --- Continue above medications, repeat labs to be done in 1 week     Acute kidney injury, improving, resolved  - Likely secondary to dehydration with decreased oral intake.  - creat 2.68 > 2.88 > 3.25 > 3.21 >3.20>3.08>2.33>1.92>1.74>1.5>1.23>1.07  - urine creat 71.1 > 34.8  - urine sodium 23, Fena calculated showing likely pre-renal cause  - Patient given 1 L of IV fluids in the ER.  As above, will place  on hypertonic saline at 15 mL/h for 4 hours and check BMPs every 4 hours (complete)  - 5/31 received D5 .45 overnight - bolus 100ml 3% NS this am and then 0.9NaCl @125ml/hr and monitor q4hr BMP   - CT scan of the abdomen pelvis with no signs of hydronephrosis, powell placed for retention  - Hold home medication of losartan with acute kidney injury.  - daily BMP  --- Repeat labs in 1 week     Nonspecific enteritis  - Dr. Garner consult, appreciate recs, feels no surgical need at this time   - tolerating diet  - WBC 19.8>18.8>19.3>19.9>15.1>15.7>15.3  - blood culture: grew E. Coli  - continue Augmentin 875 mg BID  - ID consult  --- Complete Augmentin course     New onset atrial fibrillation  Essential HTN  AAA  - continue metoprolol succinate 100 mg daily, amlodipine 10 mg daily  - hold losartan  - discontinued heparin gtt   - continue apixaban 5 mg BID  - echo: normal LVSF with an EF of 65-70%. Moderate mitral regurgitation. Moderate to severe tricuspid regurgitation. Mild aortic  regurgitation.  9. A bubble study using agitated saline was performed. Bubble study is positive. A large PFO (> 20 bubbles) was demonstrated  - Consult cardiology, recommending outpatient cardioversion after being apixaban for 1 month  - cardiac monitoring via telemetry  - 4 cm ascending thoracic aortic aneurysm found incidentally on CT scan  - Recommend follow-up with primary care provider and cardiothoracic surgery as an outpatient.  - patient is aware of findings   - monitor BP and HR  --- Continue medications as listed above  --- Apixaban on hold for anemia  --- Follow-up with cardiology     Acute cystitis with hematuria  Bacteremia  - UA 2+ blood; 3+ leukocytes, > 50 WBC; 4+bact  - urine culture grew E. Coli  - Blood cultures 4/4 positive for E. Coli  - Given ceftriaxone and zosyn  - continue augmentin 875 mg BID x 14 days  - consult ID, appreciate recs   --- Complete Augmentin course     Urinary retention, resolved  - Powell catheter   -  strict IO  - void trial today; removed Campbell  - Good urine output 10,105 mL in the past 24hr     Iron Deficiency  Normocytic Anemia  - Hb 7.8/MCV 82>6.9/19.7  - Iron 25; ; % sat 14  - given IV venofer 300 mg once  - started ferrous gluconate 324 mg daily with ascorbic acid 500 mg BID  -type and screen   -patient asymptomatic   -hemoccult   --- Repeat labs in 1 week, continue ferrous gluconate     Vitamin D deficiency  --- continue cholecalciferol 25 mcg daily     Oral Candidiasis, resolved  - continue nystatin 500,000 units QID     Pertinent Physical Exam At Time of Discharge  Physical Exam  Constitutional:       Appearance: Normal appearance.   HENT:      Head: Normocephalic.      Nose: Nose normal.      Mouth/Throat:      Mouth: Mucous membranes are moist.   Eyes:      Extraocular Movements: Extraocular movements intact.   Cardiovascular:      Rate and Rhythm: Normal rate. Rhythm irregular.      Pulses: Normal pulses.      Heart sounds: Normal heart sounds.   Pulmonary:      Effort: Pulmonary effort is normal.      Breath sounds: Normal breath sounds.   Abdominal:      General: Bowel sounds are normal.      Palpations: Abdomen is soft.   Musculoskeletal:         General: Normal range of motion.      Cervical back: Normal range of motion.   Skin:     General: Skin is warm and dry.      Capillary Refill: Capillary refill takes less than 2 seconds.      Comments: Oral thrush present     Neurological:      General: No focal deficit present.      Mental Status: She is alert and oriented to person, place, and time.   Psychiatric:         Mood and Affect: Mood normal.         Behavior: Behavior normal.      Stable for discharge to SNF.  Total cumulative time spent in preparation of this discharge including documentation review, coordination of care with the medical team including PT/SW/care coordinators and treating consultants, discussion with patient and pertinent family members and finalization of  prescriptions, follow-up appointments, and this discharge summary was approximately 45 minutes.    Outpatient Follow-Up  Future Appointments   Date Time Provider Department Center   6/23/2025  2:00 PM Rosanna Arevalo MD [id number] Jose L Danielle, APRN-CNP

## 2025-06-09 NOTE — PROGRESS NOTES
Mireya Nava is a 74 y.o. female on day 10 of admission presenting with Generalized weakness.    Subjective   Interval History:   Afebrile, no chills  No chest pain or shortness of breath  No nausea vomiting or diarrhea     Review of Systems   All other systems reviewed and are negative.      Objective   Range of Vitals (last 24 hours)  Heart Rate:  []   Temp:  [36.5 °C (97.7 °F)-36.9 °C (98.4 °F)]   Resp:  [16-18]   BP: (122-127)/(65-69)   Weight:  [73.9 kg (162 lb 14.7 oz)]   SpO2:  [89 %-96 %]   Daily Weight  06/09/25 : 73.9 kg (162 lb 14.7 oz)    Body mass index is 27.97 kg/m².    Physical Exam  Constitutional:       Appearance: Normal appearance.   HENT:      Head: Normocephalic and atraumatic.      Right Ear: External ear normal.      Left Ear: External ear normal.      Nose: Nose normal.   Eyes:      General: No scleral icterus.     Extraocular Movements: Extraocular movements intact.      Conjunctiva/sclera: Conjunctivae normal.   Cardiovascular:      Rate and Rhythm: Normal rate and regular rhythm.   Musculoskeletal:      Cervical back: Normal range of motion and neck supple.   Neurological:      Mental Status: She is alert.     Antibiotics  amoxicillin-clavulanate - 875-125 mg  nystatin - 100,000 unit/mL    Relevant Results  Labs  Results from last 72 hours   Lab Units 06/09/25  0717 06/08/25  1100 06/08/25  0707   WBC AUTO x10*3/uL 13.1* 13.3* 13.2*   HEMOGLOBIN g/dL 6.9* 7.4* 6.9*   HEMATOCRIT % 20.1* 21.3* 19.7*   PLATELETS AUTO x10*3/uL 544* 489* 491*   NEUTROS PCT AUTO % 83.1 83.9 83.5   LYMPHS PCT AUTO % 7.1 6.5 7.3   MONOS PCT AUTO % 7.8 6.8 7.1   EOS PCT AUTO % 0.2 0.2 0.3     Results from last 72 hours   Lab Units 06/08/25  0707 06/07/25  0721   SODIUM mmol/L 128* 128*   POTASSIUM mmol/L 3.6 3.7   CHLORIDE mmol/L 95* 95*   CO2 mmol/L 24 23   BUN mg/dL 22 30*   CREATININE mg/dL 1.07* 1.23*   GLUCOSE mg/dL 111* 112*   CALCIUM mg/dL 8.2* 8.2*   ANION GAP mmol/L 13 14   EGFR mL/min/1.73m*2  "54* 46*         Estimated Creatinine Clearance: 44.1 mL/min (A) (by C-G formula based on SCr of 1.07 mg/dL (H)).  No results found for: \"CRP\"  Microbiology  Susceptibility data from last 14 days.  Collected Specimen Info Organism Ampicillin Cefazolin Cefazolin (uncomplicated UTIs only) Ciprofloxacin Gentamicin Levofloxacin Nitrofurantoin Piperacillin/Tazobactam Trimethoprim/Sulfamethoxazole   05/30/25 Urine from Clean Catch/Voided Escherichia coli  S  S  S  S  S  S  S  S  S   05/30/25 Blood culture from Peripheral Venipuncture Escherichia coli            05/30/25 Blood culture from Peripheral Venipuncture Escherichia coli  S  S   S  S  S   S  S     Imaging  ECG 12 lead  Result Date: 6/4/2025  Atrial fibrillation Cannot rule out Anterior infarct , age undetermined Abnormal ECG When compared with ECG of 02-JUN-2025 11:27, (unconfirmed) Atrial fibrillation has replaced Sinus rhythm    XR chest 1 view  Result Date: 6/4/2025  Interpreted By:  Aaron Hanna, STUDY: XR CHEST 1 VIEW;  6/4/2025 10:51 am   INDICATION: Signs/Symptoms:per nuclear request.     COMPARISON: Chest x-ray 06/01/2025   ACCESSION NUMBER(S): [id number]   ORDERING CLINICIAN: ALEXA PROCTOR   FINDINGS: AP radiograph of the chest was provided.       CARDIOMEDIASTINAL SILHOUETTE: Cardiomediastinal silhouette is normal in size and configuration.   LUNGS: Lungs are clear.   ABDOMEN: No remarkable upper abdominal findings.   BONES: No acute osseous changes.       1.  No evidence of acute cardiopulmonary process.       MACRO: None   Signed by: Aaron Hanna 6/4/2025 4:09 PM Dictation workstation:   [id number]    NM Lung perfusion with spect  Result Date: 6/4/2025  Interpreted By:  Floyd Donnelly,  Todd Cifuentes STUDY: NM LUNG PERFUSION WITH SPECT;  6/4/2025 9:22 am   INDICATION: Signs/Symptoms:elevated d dimer.     COMPARISON: None.   ACCESSION NUMBER(S): [id number]   ORDERING CLINICIAN: LUKE YEUNG   TECHNIQUE: DIVISION OF NUCLEAR " MEDICINE VENTILATION/PERFUSION LUNG SCANS   Perfusion images of the lungs were then acquired after the intravenous administration of  4.3 mCi of Tc-99m macroaggregated albumin (MAA). Additionally, SPECT images were obtained.   FINDINGS: Perfusion images demonstrate a normal distribution of radiopharmaceutical throughout the lung fields.  There are no segmental or subsegmental perfusion abnormalities.       Normal perfusion lung scan with no evidence of segmental or subsegmental perfusion abnormality.   I personally reviewed the images/study and I agree with the findings as stated by Resident Dr. Vane Renteria MD. This study was interpreted at [place], [place].   MACRO: None   Signed by: Floyd Donnelly 6/4/2025 11:18 AM Dictation workstation:   [id number]    Transthoracic Echo Complete  Result Date: 6/2/2025   [facility], [address] [place] [address]              Tel [phone] and Fax [phone] TRANSTHORACIC ECHOCARDIOGRAM REPORT  Patient Name:       FANNY DARLING   Reading Physician:    [id number] Rosanna Arevalo MD Study Date:         6/2/2025            Ordering Provider:    [id number] ELICEO WONG MRN/PID:            [id number]            Fellow: Accession#:         [id number]        Nurse:                Bev Luna RN Date of Birth/Age:  1950 / 74      Sonographer:          Aria Dixon RDCS                     years Gender assigned at  F                   Additional Staff: Birth: Height:             162.56 cm           Admit Date: Weight:             78.93 kg            Admission Status:     Inpatient -                                                               Routine BSA / BMI:          1.84 m2 / 29.87     Encounter#:           [id number]                     kg/m2 Blood Pressure:     108/61 mmHg          Department Location:  [facility] Study Type:    TRANSTHORACIC ECHO (TTE) COMPLETE Diagnosis/ICD: Unspecified atrial fibrillation-I48.91 Indication:    AFib CPT Code:      Echo Complete w Full Doppler-20103 Patient History: Pertinent History: A-Fib, HTN, Hyperlipidemia and Murmur. Weakness. Study Detail: The following Echo studies were performed: 2D, M-Mode, Doppler and               color flow. Agitated saline used as a contrast agent for               intraseptal flow evaluation. The patient was awake.  PHYSICIAN INTERPRETATION: Left Ventricle: Left ventricular ejection fraction is normal by visual estimate at 65-70%. There are no regional left ventricular wall motion abnormalities. The left ventricular cavity size is normal. There is mildly increased septal and mildly increased posterior left ventricular wall thickness. There is left ventricular concentric remodeling. Spectral Doppler shows a normal pattern of left ventricular diastolic filling. Left Atrium: The left atrium is mildly dilated. The patent foramen ovale was visualized using agitated saline contrast. A bubble study using agitated saline was performed. Bubble study is positive. A large PFO (> 20 bubbles) was demonstrated. Right Ventricle: The right ventricle is mildly enlarged. There is normal right ventricular global systolic function. Right Atrium: The right atrium is mildly dilated. Aortic Valve: There is mild aortic valve regurgitation. Mitral Valve: The mitral valve is normal in structure. There is moderate mitral valve regurgitation. The E Vmax is 1.02 m/s. Tricuspid Valve: The tricuspid valve is structurally normal. There is moderate to severe tricuspid regurgitation. The doppler estimated RVSP is within normal limits with a right ventricular systolic pressure of 30 mmHg. Pulmonic Valve: The pulmonic valve is structurally normal. There is physiologic pulmonic valve regurgitation. Pericardium: There is no pericardial effusion noted. Aorta: The  aortic root is normal.  CONCLUSIONS:  1. Left ventricular ejection fraction is normal by visual estimate at 65-70%.  2. There is normal right ventricular global systolic function.  3. Mildly enlarged right ventricle.  4. The left atrium is mildly dilated.  5. Moderate mitral valve regurgitation.  6. Moderate to severe tricuspid regurgitation visualized.  7. The doppler estimated RVSP is within normal limits with a right ventricular systolic pressure of 30 mmHg.  8. Mild aortic valve regurgitation.  9. A bubble study using agitated saline was performed. Bubble study is positive. A large PFO (> 20 bubbles) was demonstrated. QUANTITATIVE DATA SUMMARY:  2D MEASUREMENTS:          Normal Ranges: Ao Root d:       3.20 cm  (2.0-3.7cm) LAs:             3.90 cm  (2.7-4.0cm) IVSd:            1.20 cm  (0.6-1.1cm) LVPWd:           1.19 cm  (0.6-1.1cm) LVIDd:           3.69 cm  (3.9-5.9cm) LVIDs:           2.39 cm LV Mass Index:   79 g/m2 LVEDV Index:     44 ml/m2 LV % FS          35.2 %  LEFT ATRIUM:                  Normal Ranges: LA Vol A4C:        41.6 ml    (22+/-6mL/m2) LA Vol A2C:        69.7 ml LA Vol BP:         61.0 ml LA Vol Index A4C:  22.6ml/m2 LA Vol Index A2C:  37.8 ml/m2 LA Vol Index BP:   33.1 ml/m2 LA Area A4C:       15.0 cm2 LA Area A2C:       22.0 cm2 LA Major Axis A4C: 4.6 cm LA Major Axis A2C: 5.9 cm  RIGHT ATRIUM:                 Normal Ranges: RA Vol A4C:        31.9 ml    (8.3-19.5ml) RA Vol Index A4C:  17.3 ml/m2 RA Area A4C:       15.0 cm2 RA Major Axis A4C: 6.0 cm  AORTA MEASUREMENTS:         Normal Ranges: Asc Ao, d:          3.50 cm (2.1-3.4cm)  LV SYSTOLIC FUNCTION:                      Normal Ranges: EF-A4C View:    71 % (>=55%) EF-A2C View:    71 % EF-Biplane:     72 % EF-Visual:      68 % LV EF Reported: 68 %  LV DIASTOLIC FUNCTION:            Normal Ranges: MV Peak E:             1.02 m/s   (0.7-1.2 m/s) MV e'                  0.151 m/s  (>8.0) MV lateral e'          0.18 m/s MV medial e'            0.13 m/s E/e' Ratio:            6.75       (<8.0) PulmV Sys Danny:         38.10 cm/s PulmV Godfrey Danny:        46.90 cm/s PulmV S/D Danny:         0.80  MITRAL VALVE:          Normal Ranges: MV DT:        213 msec (150-240msec)  AORTIC VALVE:            Normal Ranges: AoV Vmax:      1.80 m/s  (<=1.7m/s) AoV Peak P.0 mmHg (<20mmHg) LVOT Max Danny:  1.12 m/s  (<=1.1m/s) LVOT VTI:      19.80 cm LVOT Diameter: 2.00 cm   (1.8-2.4cm) AoV Area,Vmax: 1.95 cm2  (2.5-4.5cm2)  AORTIC INSUFFICIENCY: AI Vmax:       3.18 m/s AI Half-time:  615 msec AI Decel Rate: 152.00 cm/s2  RIGHT VENTRICLE: RV Basal 4.70 cm RV Mid   3.90 cm RV Major 7.9 cm TAPSE:   24.4 mm RV s'    0.13 m/s  TRICUSPID VALVE/RVSP:          Normal Ranges: Peak TR Velocity:     2.58 m/s Est. RA Pressure:     3 RV Syst Pressure:     30       (< 30mmHg) IVC Diam:             1.48 cm  PULMONIC VALVE:          Normal Ranges: PV Max Danny:     1.3 m/s  (0.6-0.9m/s) PV Max P.4 mmHg  PULMONARY VEINS: PulmV Godfrey Danny: 46.90 cm/s PulmV S/D Danny:  0.80 PulmV Sys Danny:  38.10 cm/s  [id number] Rosanna Arevalo MD Electronically signed on 2025 at 9:27:59 PM  ** Final **     XR abdomen 1 view  Result Date: 2025  Interpreted By:  Aaron Hanna, STUDY: XR ABDOMEN 1 VIEW;  2025 4:19 pm   INDICATION: Signs/Symptoms:n/v.     COMPARISON: CT chest abdomen and pelvis 2020   ACCESSION NUMBER(S): [id number]   ORDERING CLINICIAN: LUKE YEUNG   FINDINGS: Nonobstructive bowel gas pattern. Limited evaluation of pneumoperitoneum on supine imaging, however no gross evidence of free air is noted.   Visualized lungs are clear.   Osseous structures demonstrate no acute bony changes.       1.  Unremarkable exam.   MACRO: None   Signed by: Aaron Hanna 2025 4:30 PM Dictation workstation:   [id number]    Electrocardiogram, 12-lead PRN ACS symptoms  Result Date: 2025  Sinus rhythm with Premature atrial complexes Otherwise normal ECG When compared  with ECG of 30-MAY-2025 11:11, (unconfirmed) Sinus rhythm has replaced Atrial fibrillation    ECG 12 lead  Result Date: 6/2/2025  Atrial fibrillation Abnormal ECG When compared with ECG of 27-DEC-2005 11:33, Atrial fibrillation has replaced Sinus rhythm Vent. rate has increased BY  31 BPM Nonspecific T wave abnormality now evident in Anterior leads    XR chest 1 view  Result Date: 6/1/2025  Interpreted By:  Myrtle Duque, STUDY: XR CHEST 1 VIEW;  6/1/2025 1:37 am   INDICATION: Signs/Symptoms:change in breathing     COMPARISON: CT chest, abdomen, pelvis 05/30/2025.   ACCESSION NUMBER(S): [id number]   ORDERING CLINICIAN: ALEXA PROCTOR   TECHNIQUE: Portable upright frontal view of the chest was obtained .   FINDINGS: Monitoring leads are overlying the patient.   The heart is enlarged. There is mild interstitial edema.   There is a small left pleural effusion with airspace opacity at the left lung base. No pneumothorax.       1.  Cardiomegaly. Mild interstitial edema. Small left pleural effusion with airspace opacity at the left lung base, may be secondary to atelectasis or pneumonia.       MACRO: None.   Signed by: Myrtle Duque 6/1/2025 3:24 AM Dictation workstation:   [id number]    CT chest abdomen pelvis wo IV contrast  Result Date: 5/30/2025  Interpreted By:  Shanel Valencia, STUDY: CT CHEST ABDOMEN PELVIS WO CONTRAST;  5/30/2025 12:50 pm   INDICATION: Signs/Symptoms:cough, abd pain.   COMPARISON: None.   ACCESSION NUMBER(S): [id number]   ORDERING CLINICIAN: DARA WELCH   TECHNIQUE: CT of the chest, abdomen, and pelvis was performed without intravenous contrast.   FINDINGS:   CHEST:   Lines/Devices: None   Lungs: The trachea and central airways are patent without endobronchial lesions. There is a small left pleural effusion with adjacent compressive atelectasis. No focal consolidation, pulmonary edema, pneumothorax or suspicious nodule.   Vasculature: Mild dilation of the thoracic aorta in the  ascending segment measuring 4 cm. The main pulmonary artery is normal in size. Mild coronary artery calcifications noted in the LAD.   Heart: Heart size is normal. No pericardial effusion.   Mediastinum and lyudmila: No pathologically enlarged thoracic lymphadenopathy. The esophagus is unremarkable.   Chest wall and lower neck: No significant chest wall soft tissue abnormalities. The visualized thyroid is normal.   ABDOMEN/PELVIS:   Liver: No mass. Hepatomegaly measuring 20 cm in the craniocaudal dimension.   Biliary: No intrahepatic or extrahepatic bile duct dilation. The gallbladder is collapsed and limited for evaluation.   Spleen: No mass. No splenomegaly.   Pancreas: No mass, main duct dilation or acute inflammation.   Adrenals: Normal.   Kidneys: No calculus or hydronephrosis.   GI tract: There is a small amount of free fluid and fat stranding centered around the cecum. The appendix is not identified. No pericecal free air or organized fluid collection. There are multiple loops of fluid-filled, nondilated small bowel in the pelvis. No bowel obstruction or bowel wall thickening otherwise.   Lymph nodes: No abdominopelvic lymphadenopathy.   Mesentery/peritoneum: No free air or fluid collection. There is mild generalized haziness involving the peritoneum.   Vasculature: Moderate abdominal aortic atherosclerotic calcifications without aneurysmal dilation.   Pelvis: No free air or fluid collection. Trace free fluid in the dependent pelvis. The bladder is moderate distended but otherwise normal-appearing. The uterus appears grossly unremarkable.   Bones/Soft tissues: Mild levocurvature of the lumbar spine with multilevel thoracolumbar degenerative disc disease. Moderate to severe bilateral hip osteoarthritis. Mild abdominal wall edema.         Limited examination without intravenous contrast.   The appendix is not visualized, although right lower quadrant inflammation and free fluid centered around the cecum raise  concern for possible acute appendicitis. No organized fluid collection. Please correlate with right lower quadrant tenderness.   Multiple fluid-filled small bowel within the pelvis could represent nonspecific enteritis. No bowel obstruction.   Small left pleural effusion without definite evidence of pneumonia.   4 cm ascending thoracic aortic aneurysm.   MACRO Shanel Valencia discussed the significance and urgency of this critical finding by Epic secure chat with  DARA WELCH on 5/30/2025 at 2:03 pm.  (**-RCF-**) Findings:  See findings.   Signed by: Shanel Valencia 5/30/2025 2:05 PM Dictation workstation:   [id number]     Assessment/Plan   Sepsis secondary to E. coli bacteremia  E. coli bacteremic, possibly secondary to urinary tract infection  E. coli urinary tract infection  Acute kidney injury, resolved  Leukocytosis, multifactorial,resolving         Continue Augmentin- optimize dose   Monitor renal function  Supportive care  Monitor temperature and WBC  Long-term plan is total of 14 days of antibiotic therapy  discharge planning        This is a complex infectious disease issue and the following was performed today (for more details please see the above note): Management decisions reflecting the added complexity (e.g., changes in antimicrobial therapy, infection control strategies).     Nemesio Hansen MD

## 2025-06-10 ENCOUNTER — TELEPHONE (OUTPATIENT)
Dept: PRIMARY CARE | Facility: CLINIC | Age: 75
End: 2025-06-10
Payer: MEDICARE

## 2025-06-10 LAB
BB ANTIBODY IDENTIFICATION: NORMAL
CASE #: NORMAL
METHYLMALONATE SERPL-SCNC: 0.32 UMOL/L (ref 0–0.4)

## 2025-06-10 NOTE — TELEPHONE ENCOUNTER
Pt called she went to the ER./ [facility] end of May [place]  to  Rehab yesterday  she is currently at [facility] for rehab, had a UTI with E-coli, blood infection, she had a CPE 6/11 she had to cancel, she will call us when she is released Is not sure possibly a few more days, wanted to let SJPABLITO know

## 2025-06-11 ENCOUNTER — APPOINTMENT (OUTPATIENT)
Dept: PRIMARY CARE | Facility: CLINIC | Age: 75
End: 2025-06-11
Payer: MEDICARE

## 2025-06-11 ENCOUNTER — NURSING HOME VISIT (OUTPATIENT)
Dept: POST ACUTE CARE | Facility: EXTERNAL LOCATION | Age: 75
End: 2025-06-11

## 2025-06-11 DIAGNOSIS — N30.01 ACUTE CYSTITIS WITH HEMATURIA: ICD-10-CM

## 2025-06-11 DIAGNOSIS — D64.9 NORMOCYTIC ANEMIA: ICD-10-CM

## 2025-06-11 DIAGNOSIS — D50.9 IRON DEFICIENCY ANEMIA, UNSPECIFIED IRON DEFICIENCY ANEMIA TYPE: ICD-10-CM

## 2025-06-11 DIAGNOSIS — R60.0 BILATERAL LOWER EXTREMITY EDEMA: ICD-10-CM

## 2025-06-11 DIAGNOSIS — I71.40 ABDOMINAL AORTIC ANEURYSM (AAA) WITHOUT RUPTURE, UNSPECIFIED PART: ICD-10-CM

## 2025-06-11 DIAGNOSIS — I10 PRIMARY HYPERTENSION: ICD-10-CM

## 2025-06-11 DIAGNOSIS — I48.91 ATRIAL FIBRILLATION, UNSPECIFIED TYPE (MULTI): ICD-10-CM

## 2025-06-11 DIAGNOSIS — E87.1 HYPONATREMIA: ICD-10-CM

## 2025-06-11 DIAGNOSIS — R33.9 URINARY RETENTION: ICD-10-CM

## 2025-06-11 DIAGNOSIS — N17.9 AKI (ACUTE KIDNEY INJURY): ICD-10-CM

## 2025-06-11 DIAGNOSIS — R53.1 GENERALIZED WEAKNESS: ICD-10-CM

## 2025-06-11 DIAGNOSIS — K52.9 ENTERITIS: ICD-10-CM

## 2025-06-11 DIAGNOSIS — E55.9 VITAMIN D DEFICIENCY: ICD-10-CM

## 2025-06-11 PROCEDURE — 99305 1ST NF CARE MODERATE MDM 35: CPT | Performed by: INTERNAL MEDICINE

## 2025-06-11 NOTE — LETTER
Patient: Mireya Nava  : 1950    Encounter Date: 2025    Name: Mireya Nava  : 1950  MRN: [id number]  Visit Date: 2025  Chief Complaint: HISTORY AND PHYSICAL    HPI: Mireya Nava is a 74 y.o. female with PMH remarkable for HTN, Anemia, detached retina s/p repair who presented to [facility] ER on 25 with complaint of weakness. She was foun to have hyponatremia and TAMMY 2/2 dehdydration(reported no PO Intake for 5-6 days prior to ER encounter with nausea and vomiting). She was admitted to ICU for close monitoring, given appropriate IVF to slowly increase Na. Her inpatient stay was complicated by new onset Afib for which she was continued on her home metoprolol and started on IV heparin gtt. Dr. Garner was consulted for enteritis, no surgical intervention was recommended. 2D echo showed EF of 65 to 70%. Cardiology was consulted and recommended outpatient cardioversion after oral anticoagulation of 1 month. She was transitioned from Heparin gtt to Eliquis. She was found to have UTI, which was treated with ceftriaxone, Zosyn and then transitioned to oral Augmentin to finish a course of 14 days.  Infectious disease was consulted,managed antibiotics.   Campbell catheter was placed for acute retention and was removed prior to discharge without complication. She was found to have iron deficiency and normocytic anemia.  She was treated with IV Venofer and oral ferrous gluconate.  Stool occult remained negative and she was asymptomatic of the anemia per hospital notes. Apixaban was held due to her significant anemia. Once HDS, she was discharged to [facility] on 25.    Subjective: Seen and examined today. She is sitting up in bed awake in no acute distress. She denies any new issues or complaints.    The patient was counseled regarding diagnostic results, instructions for management, risk factor reductions, prognosis, patient and family education, impressions, risks  and benefits of treatment options and importance of compliance with treatment. I have reviewed nursing notes since my last visit and document any significant changes Reviewed orders, medications, Labs. Reviewed chart looking at current medications, treatments, labs, x-rays etc.     ROS:  As above in subjective. Otherwise, all other systems have been reviewed and are negative for complaint.    Medications:  Medications reviewed and verified in NH chart.     Medical History[1]    Surgical History[2]    Family History[3]    Social History     Tobacco Use   • Smoking status: Never   • Smokeless tobacco: Never   Substance Use Topics   • Alcohol use: Not Currently     Comment: rare     Allergies[4]     Vital Signs:   Vital Signs were reviewed in nursing home documentation.    Physical Exam  Vitals and nursing note reviewed.   Constitutional:       Appearance: Normal appearance.   HENT:      Head: Normocephalic and atraumatic.   Cardiovascular:      Rate and Rhythm: Normal rate and regular rhythm.      Pulses: Normal pulses.      Heart sounds: Normal heart sounds.   Pulmonary:      Effort: Pulmonary effort is normal.      Breath sounds: Normal breath sounds.   Abdominal:      General: Bowel sounds are normal.      Palpations: Abdomen is soft.   Musculoskeletal:         General: Normal range of motion.      Comments: 2+ BLE edema   Skin:     General: Skin is warm and dry.   Neurological:      General: No focal deficit present.      Mental Status: She is alert and oriented to person, place, and time.   Psychiatric:         Attention and Perception: Attention normal.         Mood and Affect: Mood normal.         Speech: Speech normal.         Behavior: Behavior normal. Behavior is cooperative.         Thought Content: Thought content normal.       Results/Data:   Lab Results   Component Value Date    WBC 13.1 (H) 06/09/2025    HGB 6.9 (L) 06/09/2025    HCT 20.1 (L) 06/09/2025     (H) 06/09/2025    CHOL 226 (H)  05/30/2024    TRIG 99 05/30/2024    HDL 83.5 05/30/2024    ALT 15 06/02/2025    AST 11 06/02/2025     (L) 06/09/2025    K 3.8 06/09/2025    CL 95 (L) 06/09/2025    CREATININE 0.98 06/09/2025    BUN 19 06/09/2025    CO2 24 06/09/2025    TSH 0.58 05/31/2025    HGBA1C 5.7 (H) 05/30/2025     Results were reviewed and addressed accordingly. Lab Results were also reviewed in [facility].     Provider Impression:   Hyponatremia  - Na improved from 109-->128 while inpatient  - Urine na 22 felt to be 2/2 dehydration as she had poor PO intake with nausea and vomiting 5-6 days prior to ER visit  - cortisol while inpatient was 28.5  - continue with sodium bicarbonate  - monitor Na level    TAMMY  - improved while inpatient with IVF's  - CT scan of the abdomen pelvis while inpatient showed no signs of hydronephrosis, powell placed for retention while inpatient and successfully removed  - Losartan was held while inpatient due to kidney function  - continue to monitor kidney function     Nonspecific enteritis  - Dr. Garner was consulted during inpatient stay, felt no surgical intervention   - blood culture: grew E. Coli  - continue with course of PO Augmentin  - continue with PPI  - Encourage and monitor PO intake    New onset atrial fibrillation, Essential HTN, AAA  - continue with metoprolol succinate 100 mg daily, amlodipine 10 mg daily, Losartan 100mg daily  - continue apixaban 5 mg BID for stroke prevention  - echo from 6/2/25 revealed: normal LVSF with an EF of 65-70%. Moderate mitral regurgitation. Moderate to severe tricuspid regurgitation. Mild aortic  regurgitation.  9. A bubble study using agitated saline was performed. Bubble study is positive. A large PFO (> 20 bubbles) was demonstrated  - Cardiology was consulted while inpatient, rec OP cardioversion after being on OAC X 1 month  - 4 cm ascending thoracic aortic aneurysm found incidentally on CT scan, per hospital notes, patient was made aware of findings while  inpatient, will need monitoring  - monitor vital signs     UTI, Bacteremia  - UA 2+ blood; 3+ leukocytes, > 50 WBC; 4+bact  - urine culture grew E. Coli  - Blood cultures 4/4 positive for E. Coli  - Given ceftriaxone and zosyn while inpatient  - continue augmentin 875 mg BID x 14 days  - monitor     Urinary retention  - she had powell placed while inpatient, with successful voiding trial after removal  - monitor      Iron Deficiency, Normocytic Anemia  - Hb 7.8/MCV 82>6.9/19.7  - Iron 25; ; % sat 14  - given IV venofer 300 mg once while inpatient  - continue with ferrous gluconate 324 mg daily with ascorbic acid 500 mg BID  - monitor CBC     Vitamin D deficiency  --- continue cholecalciferol 25 mcg daily    Generalized weakness  - consult PT/OT    BLE edema  - elevate as tolerated  - compression stocking during day  - monitor  ----------------  Written by Nicole Francisco RN, acting as a scribe for Dr. Ramirez. This note accurately reflects the work and decisions made by Dr. Ramirez.     I, Dr. Ramirez, attest all medical record entries made by the scribe were under my direction and were personally dictated by me. I have reviewed the chart and agree that the record accurately reflects my performance of the history, physical exam, and assessment and plan.         Electronically Signed By: Isabella Walker MD   6/24/25  3:37 PM       [1]  Past Medical History:  Diagnosis Date   • Asymptomatic menopausal state 06/03/2022   • Encounter for screening mammogram for malignant neoplasm of breast 01/12/2021   • Seasonal allergies 08/19/2019   [2]  No past surgical history on file.  [3]  No family history on file.  [4]  No Known Allergies

## 2025-06-12 DIAGNOSIS — E87.1 HYPONATREMIA: ICD-10-CM

## 2025-06-12 DIAGNOSIS — D64.9 ANEMIA, UNSPECIFIED TYPE: ICD-10-CM

## 2025-06-12 LAB
Q ONSET: 217 MS
QRS COUNT: 16 BEATS
QRS DURATION: 90 MS
QT INTERVAL: 322 MS
QTC CALCULATION(BAZETT): 404 MS
QTC FREDERICIA: 375 MS
R AXIS: 8 DEGREES
T AXIS: 34 DEGREES
T OFFSET: 378 MS
VENTRICULAR RATE: 95 BPM

## 2025-06-16 NOTE — PROGRESS NOTES
Name: Mireya Nava  : 1950  MRN: [id number]  Visit Date: 2025  Chief Complaint: HISTORY AND PHYSICAL    HPI: Mireya Nava is a 74 y.o. female with PMH remarkable for HTN, Anemia, detached retina s/p repair who presented to [facility] ER on 25 with complaint of weakness. She was foun to have hyponatremia and TAMMY 2/2 dehdydration(reported no PO Intake for 5-6 days prior to ER encounter with nausea and vomiting). She was admitted to ICU for close monitoring, given appropriate IVF to slowly increase Na. Her inpatient stay was complicated by new onset Afib for which she was continued on her home metoprolol and started on IV heparin gtt. Dr. Garner was consulted for enteritis, no surgical intervention was recommended. 2D echo showed EF of 65 to 70%. Cardiology was consulted and recommended outpatient cardioversion after oral anticoagulation of 1 month. She was transitioned from Heparin gtt to Eliquis. She was found to have UTI, which was treated with ceftriaxone, Zosyn and then transitioned to oral Augmentin to finish a course of 14 days.  Infectious disease was consulted,managed antibiotics.   Campbell catheter was placed for acute retention and was removed prior to discharge without complication. She was found to have iron deficiency and normocytic anemia.  She was treated with IV Venofer and oral ferrous gluconate.  Stool occult remained negative and she was asymptomatic of the anemia per hospital notes. Apixaban was held due to her significant anemia. Once HDS, she was discharged to [facility] on 25.    Subjective: Seen and examined today. She is sitting up in bed awake in no acute distress. She denies any new issues or complaints.    The patient was counseled regarding diagnostic results, instructions for management, risk factor reductions, prognosis, patient and family education, impressions, risks and benefits of treatment options and importance of compliance with  treatment. I have reviewed nursing notes since my last visit and document any significant changes Reviewed orders, medications, Labs. Reviewed chart looking at current medications, treatments, labs, x-rays etc.     ROS:  As above in subjective. Otherwise, all other systems have been reviewed and are negative for complaint.    Medications:  Medications reviewed and verified in NH chart.     Medical History[1]    Surgical History[2]    Family History[3]    Social History     Tobacco Use    Smoking status: Never    Smokeless tobacco: Never   Substance Use Topics    Alcohol use: Not Currently     Comment: rare     Allergies[4]     Vital Signs:   Vital Signs were reviewed in nursing home documentation.    Physical Exam  Vitals and nursing note reviewed.   Constitutional:       Appearance: Normal appearance.   HENT:      Head: Normocephalic and atraumatic.   Cardiovascular:      Rate and Rhythm: Normal rate and regular rhythm.      Pulses: Normal pulses.      Heart sounds: Normal heart sounds.   Pulmonary:      Effort: Pulmonary effort is normal.      Breath sounds: Normal breath sounds.   Abdominal:      General: Bowel sounds are normal.      Palpations: Abdomen is soft.   Musculoskeletal:         General: Normal range of motion.      Comments: 2+ BLE edema   Skin:     General: Skin is warm and dry.   Neurological:      General: No focal deficit present.      Mental Status: She is alert and oriented to person, place, and time.   Psychiatric:         Attention and Perception: Attention normal.         Mood and Affect: Mood normal.         Speech: Speech normal.         Behavior: Behavior normal. Behavior is cooperative.         Thought Content: Thought content normal.       Results/Data:   Lab Results   Component Value Date    WBC 13.1 (H) 06/09/2025    HGB 6.9 (L) 06/09/2025    HCT 20.1 (L) 06/09/2025     (H) 06/09/2025    CHOL 226 (H) 05/30/2024    TRIG 99 05/30/2024    HDL 83.5 05/30/2024    ALT 15 06/02/2025     AST 11 06/02/2025     (L) 06/09/2025    K 3.8 06/09/2025    CL 95 (L) 06/09/2025    CREATININE 0.98 06/09/2025    BUN 19 06/09/2025    CO2 24 06/09/2025    TSH 0.58 05/31/2025    HGBA1C 5.7 (H) 05/30/2025     Results were reviewed and addressed accordingly. Lab Results were also reviewed in [facility].     Provider Impression:   Hyponatremia  - Na improved from 109-->128 while inpatient  - Urine na 22 felt to be 2/2 dehydration as she had poor PO intake with nausea and vomiting 5-6 days prior to ER visit  - cortisol while inpatient was 28.5  - continue with sodium bicarbonate  - monitor Na level    TAMMY  - improved while inpatient with IVF's  - CT scan of the abdomen pelvis while inpatient showed no signs of hydronephrosis, powell placed for retention while inpatient and successfully removed  - Losartan was held while inpatient due to kidney function  - continue to monitor kidney function     Nonspecific enteritis  - Dr. Garnre was consulted during inpatient stay, felt no surgical intervention   - blood culture: grew E. Coli  - continue with course of PO Augmentin  - continue with PPI  - Encourage and monitor PO intake    New onset atrial fibrillation, Essential HTN, AAA  - continue with metoprolol succinate 100 mg daily, amlodipine 10 mg daily, Losartan 100mg daily  - continue apixaban 5 mg BID for stroke prevention  - echo from 6/2/25 revealed: normal LVSF with an EF of 65-70%. Moderate mitral regurgitation. Moderate to severe tricuspid regurgitation. Mild aortic  regurgitation.  9. A bubble study using agitated saline was performed. Bubble study is positive. A large PFO (> 20 bubbles) was demonstrated  - Cardiology was consulted while inpatient, rec OP cardioversion after being on OAC X 1 month  - 4 cm ascending thoracic aortic aneurysm found incidentally on CT scan, per hospital notes, patient was made aware of findings while inpatient, will need monitoring  - monitor vital signs     UTI, Bacteremia  - UA 2+  blood; 3+ leukocytes, > 50 WBC; 4+bact  - urine culture grew E. Coli  - Blood cultures 4/4 positive for E. Coli  - Given ceftriaxone and zosyn while inpatient  - continue augmentin 875 mg BID x 14 days  - monitor     Urinary retention  - she had powell placed while inpatient, with successful voiding trial after removal  - monitor      Iron Deficiency, Normocytic Anemia  - Hb 7.8/MCV 82>6.9/19.7  - Iron 25; ; % sat 14  - given IV venofer 300 mg once while inpatient  - continue with ferrous gluconate 324 mg daily with ascorbic acid 500 mg BID  - monitor CBC     Vitamin D deficiency  --- continue cholecalciferol 25 mcg daily    Generalized weakness  - consult PT/OT    BLE edema  - elevate as tolerated  - compression stocking during day  - monitor  ----------------  Written by Nicole Francisco RN, acting as a scribe for Dr. Ramirez. This note accurately reflects the work and decisions made by Dr. Ramirez.     I, Dr. Ramirez, attest all medical record entries made by the scribe were under my direction and were personally dictated by me. I have reviewed the chart and agree that the record accurately reflects my performance of the history, physical exam, and assessment and plan.          [1]   Past Medical History:  Diagnosis Date    Asymptomatic menopausal state 06/03/2022    Encounter for screening mammogram for malignant neoplasm of breast 01/12/2021    Seasonal allergies 08/19/2019   [2] No past surgical history on file.  [3] No family history on file.  [4] No Known Allergies

## 2025-06-17 ENCOUNTER — DOCUMENTATION (OUTPATIENT)
Dept: CRITICAL CARE MEDICINE | Facility: HOSPITAL | Age: 75
End: 2025-06-17
Payer: MEDICARE

## 2025-06-17 PROBLEM — K52.9 ENTERITIS: Status: ACTIVE | Noted: 2025-06-17

## 2025-06-17 PROBLEM — R60.0 BILATERAL LOWER EXTREMITY EDEMA: Status: ACTIVE | Noted: 2025-06-17

## 2025-06-17 PROBLEM — E55.9 VITAMIN D DEFICIENCY: Status: ACTIVE | Noted: 2025-06-17

## 2025-06-17 PROBLEM — R33.9 URINARY RETENTION: Status: ACTIVE | Noted: 2025-06-01

## 2025-06-17 PROBLEM — I71.40 AAA (ABDOMINAL AORTIC ANEURYSM): Status: ACTIVE | Noted: 2025-06-17

## 2025-06-17 PROBLEM — D64.9 NORMOCYTIC ANEMIA: Status: ACTIVE | Noted: 2025-06-17

## 2025-06-21 PROCEDURE — G0180 MD CERTIFICATION HHA PATIENT: HCPCS | Performed by: STUDENT IN AN ORGANIZED HEALTH CARE EDUCATION/TRAINING PROGRAM

## 2025-06-23 ENCOUNTER — HOSPITAL ENCOUNTER (OUTPATIENT)
Dept: CARDIOLOGY | Facility: HOSPITAL | Age: 75
Discharge: HOME | End: 2025-06-23
Payer: MEDICARE

## 2025-06-23 ENCOUNTER — APPOINTMENT (OUTPATIENT)
Dept: CARDIOLOGY | Facility: CLINIC | Age: 75
End: 2025-06-23
Payer: MEDICARE

## 2025-06-23 VITALS
OXYGEN SATURATION: 98 % | HEART RATE: 85 BPM | SYSTOLIC BLOOD PRESSURE: 132 MMHG | HEIGHT: 64 IN | DIASTOLIC BLOOD PRESSURE: 70 MMHG | BODY MASS INDEX: 24.07 KG/M2 | WEIGHT: 141 LBS

## 2025-06-23 DIAGNOSIS — I48.91 ATRIAL FIBRILLATION, UNSPECIFIED TYPE (MULTI): Primary | ICD-10-CM

## 2025-06-23 DIAGNOSIS — I48.91 ATRIAL FIBRILLATION, UNSPECIFIED TYPE (MULTI): ICD-10-CM

## 2025-06-23 DIAGNOSIS — D64.9 ANEMIA, UNSPECIFIED TYPE: ICD-10-CM

## 2025-06-23 DIAGNOSIS — R68.89 OTHER GENERAL SYMPTOMS AND SIGNS: ICD-10-CM

## 2025-06-23 PROCEDURE — 1159F MED LIST DOCD IN RCRD: CPT | Performed by: INTERNAL MEDICINE

## 2025-06-23 PROCEDURE — 93010 ELECTROCARDIOGRAM REPORT: CPT | Performed by: INTERNAL MEDICINE

## 2025-06-23 PROCEDURE — 1036F TOBACCO NON-USER: CPT | Performed by: INTERNAL MEDICINE

## 2025-06-23 PROCEDURE — 1111F DSCHRG MED/CURRENT MED MERGE: CPT | Performed by: INTERNAL MEDICINE

## 2025-06-23 PROCEDURE — 3075F SYST BP GE 130 - 139MM HG: CPT | Performed by: INTERNAL MEDICINE

## 2025-06-23 PROCEDURE — 3078F DIAST BP <80 MM HG: CPT | Performed by: INTERNAL MEDICINE

## 2025-06-23 PROCEDURE — 93005 ELECTROCARDIOGRAM TRACING: CPT

## 2025-06-23 PROCEDURE — 99215 OFFICE O/P EST HI 40 MIN: CPT | Performed by: INTERNAL MEDICINE

## 2025-06-23 NOTE — PROGRESS NOTES
Primary Care Physician: Makenzie Esparza MD        Date of Visit: 06/23/2025  2:00 PM EDT  Location of visit:  [facility]   Type of Visit: Follow up        Dear Dr Esparza,     DIAGNOSES: AF with a controlled ventricular response.  Moderate MR/TR.  Preserved LV systolic function.  Essential hypertension on treatment.  Anemia.  CKD.  Recent episode of sepsis/hyponatremia.     Chief Complaint   Patient presents with    New Patient Visit     Here for a hospital for possible new on set of A-flutter       HPI / Summary:   Mireya Nava is a 74 y.o. female   returns for routine follow up.  Symptomatically she feels much better even though still she is anemic.  She does not remember much of the hospitalization for sepsis and new onset A-fib, in the setting of severe hyponatremia.  She was treated for the same and was discharged improved, I will done today for follow-up.      12 system review is negative except as noted above  Accompanied by her sister who contributed to the history       Medical History:   Medical History[1]    Social History:   Tobacco Use: Low Risk  (6/23/2025)    Patient History     Smoking Tobacco Use: Never     Smokeless Tobacco Use: Never     Passive Exposure: Not on file         MEDICATIONS:   Current Outpatient Medications   Medication Instructions    amLODIPine (NORVASC) 10 mg, oral, Daily    calcium carbonate-vitamin D3 (Os-Jovany 500 + D3) 500 mg-5 mcg (200 unit) tablet Take by mouth.    cholecalciferol (VITAMIN D3) 25 mcg, Daily    ferrous gluconate (Fergon) 324 mg (38 mg elemental) tablet 1 tablet, oral, Daily with breakfast    losartan (COZAAR) 100 mg, oral, Daily    metoprolol succinate XL (TOPROL-XL) 100 mg, oral, Daily    pantoprazole (PROTONIX) 40 mg, oral, Daily before breakfast, Do not crush, chew, or split.    sodium bicarbonate 650 mg, oral, 2 times daily       LABS:    CBC with Differential:    Lab Results   Component Value Date    WBC 13.1 (H) 06/09/2025    RBC 2.29 (L) 06/09/2025     "HGB 6.9 (L) 06/09/2025    HCT 20.1 (L) 06/09/2025     (H) 06/09/2025    MCV 88 06/09/2025    MCH 30.1 06/09/2025    MCHC 34.3 06/09/2025    RDW 15.4 (H) 06/09/2025    NRBC 0.0 06/09/2025    LYMPHOPCT 7.1 06/09/2025    MONOPCT 7.8 06/09/2025    EOSPCT 0.2 06/09/2025    BASOPCT 0.8 06/09/2025    MONOSABS 1.02 (H) 06/09/2025    LYMPHSABS 0.93 06/09/2025    EOSABS 0.02 06/09/2025    BASOSABS 0.10 06/09/2025     CMP:    Lab Results   Component Value Date     (L) 06/09/2025    K 3.8 06/09/2025    CL 95 (L) 06/09/2025    CO2 24 06/09/2025    BUN 19 06/09/2025    CREATININE 0.98 06/09/2025    GLUCOSE 102 (H) 06/09/2025    PROT 5.0 (L) 06/02/2025    CALCIUM 8.2 (L) 06/09/2025    BILITOT 0.7 06/02/2025    ALKPHOS 175 (H) 06/02/2025    AST 11 06/02/2025    ALT 15 06/02/2025     BMP:    Lab Results   Component Value Date     (L) 06/09/2025    K 3.8 06/09/2025    CL 95 (L) 06/09/2025    CO2 24 06/09/2025    BUN 19 06/09/2025    CREATININE 0.98 06/09/2025    CALCIUM 8.2 (L) 06/09/2025    GLUCOSE 102 (H) 06/09/2025     Magnesium:  Lab Results   Component Value Date    MG 1.75 05/31/2025     Troponin:  No results found for: \"TROPHS\"  BNP:   Lab Results   Component Value Date    BNP 1,041 (H) 06/02/2025         Lipid Panel:  Lab Results   Component Value Date    HDL 83.5 05/30/2024    CHHDL 2.7 05/30/2024    VLDL 20 05/30/2024    TRIG 99 05/30/2024    NHDL 143 05/30/2024        Lab work and imaging results independently reviewed by me     Visit Vitals  /70   Pulse 85   Ht 1.626 m (5' 4\")   Wt 64 kg (141 lb)   SpO2 98%   BMI 24.20 kg/m²   OB Status Postmenopausal   Smoking Status Never   BSA 1.7 m²         Physical Exam  On examination, she was comfortably sitting.  Pallor present.  Otherwise HEENT normal.  Neck supple.  Carotids brisk upstroke.  CVS: RRR S1-S2 2 to 3/6 short systolic murmur.  Chest CTAB.  Abdomen soft nontender.  Extremities without any edema.  CNS HMF normal.  No FND.      DIAGNOSES: AF " with a controlled ventricular response.  Moderate MR/TR.  Preserved LV systolic function.  Essential hypertension on treatment.  Anemia.  CKD.  Recent episode of sepsis/hyponatremia.    I had a very detailed discussion with her regarding the nature of her cardiac ailments, specifically atrial fibrillation and the risk of thromboembolism.  I also stressed the relative and safety of starting full for the anticoagulation until we delineate the etiopathogenesis of her anemia.  I have referred her for a GI evaluation for the same.  Regarding her valvular lesion we will continue to follow the course closely.      Thank you so much for the opportunity to participate in the care of this very pleasant lady. Please do not hesitate to contact me if I could be of any assistance in her future care.    Sincerely        Alejandro Arevalo M.D.  06/23/25 at 5:25 PM - Rosanna Arevalo MD      Orders:  Orders Placed This Encounter   Procedures    Referral to Gastroenterology    ECG 12 Lead    Transthoracic echo (TTE) complete         Followup Appts:  Future Appointments   Date Time Provider Department Center   6/25/2025 11:00 AM Makenzie Esparza MD [id number] [facility]   9/22/2025 11:20 AM Rosanna Arevalo MD [id number] [facility]   12/23/2025  2:00 PM GEN ECHO [id number] [facility]           [1]   Past Medical History:  Diagnosis Date    Asymptomatic menopausal state 06/03/2022    Encounter for screening mammogram for malignant neoplasm of breast 01/12/2021    Seasonal allergies 08/19/2019

## 2025-06-25 ENCOUNTER — APPOINTMENT (OUTPATIENT)
Dept: PRIMARY CARE | Facility: CLINIC | Age: 75
End: 2025-06-25
Payer: MEDICARE

## 2025-06-25 VITALS
BODY MASS INDEX: 23.69 KG/M2 | RESPIRATION RATE: 16 BRPM | OXYGEN SATURATION: 97 % | HEART RATE: 106 BPM | SYSTOLIC BLOOD PRESSURE: 136 MMHG | WEIGHT: 138 LBS | DIASTOLIC BLOOD PRESSURE: 78 MMHG

## 2025-06-25 DIAGNOSIS — D50.9 IRON DEFICIENCY ANEMIA, UNSPECIFIED IRON DEFICIENCY ANEMIA TYPE: ICD-10-CM

## 2025-06-25 DIAGNOSIS — I48.91 ATRIAL FIBRILLATION, UNSPECIFIED TYPE (MULTI): ICD-10-CM

## 2025-06-25 DIAGNOSIS — Z09 HOSPITAL DISCHARGE FOLLOW-UP: Primary | ICD-10-CM

## 2025-06-25 DIAGNOSIS — N17.9 AKI (ACUTE KIDNEY INJURY): ICD-10-CM

## 2025-06-25 DIAGNOSIS — R39.9 UTI SYMPTOMS: ICD-10-CM

## 2025-06-25 PROBLEM — J30.2 SEASONAL ALLERGIC RHINITIS: Status: ACTIVE | Noted: 2025-06-09

## 2025-06-25 PROBLEM — H33.009 RHEGMATOGENOUS RETINAL DETACHMENT: Status: ACTIVE | Noted: 2025-06-09

## 2025-06-25 PROBLEM — R11.10 VOMITING: Status: ACTIVE | Noted: 2025-06-09

## 2025-06-25 PROBLEM — R11.0 NAUSEA: Status: ACTIVE | Noted: 2025-06-09

## 2025-06-25 PROBLEM — M62.81 MUSCLE WEAKNESS: Status: ACTIVE | Noted: 2025-06-09

## 2025-06-25 LAB
ATRIAL RATE: 69 BPM
P AXIS: 49 DEGREES
P OFFSET: 171 MS
P ONSET: 122 MS
PR INTERVAL: 186 MS
Q ONSET: 215 MS
Q ONSET: 219 MS
Q ONSET: 221 MS
QRS COUNT: 11 BEATS
QRS COUNT: 14 BEATS
QRS COUNT: 15 BEATS
QRS DURATION: 78 MS
QRS DURATION: 86 MS
QRS DURATION: 92 MS
QT INTERVAL: 352 MS
QT INTERVAL: 362 MS
QT INTERVAL: 404 MS
QTC CALCULATION(BAZETT): 430 MS
QTC CALCULATION(BAZETT): 432 MS
QTC CALCULATION(BAZETT): 432 MS
QTC FREDERICIA: 404 MS
QTC FREDERICIA: 406 MS
QTC FREDERICIA: 423 MS
R AXIS: 14 DEGREES
R AXIS: 17 DEGREES
R AXIS: 18 DEGREES
T AXIS: 24 DEGREES
T AXIS: 28 DEGREES
T AXIS: 29 DEGREES
T OFFSET: 397 MS
T OFFSET: 400 MS
T OFFSET: 417 MS
VENTRICULAR RATE: 69 BPM
VENTRICULAR RATE: 85 BPM
VENTRICULAR RATE: 91 BPM

## 2025-06-25 PROCEDURE — 3078F DIAST BP <80 MM HG: CPT | Performed by: STUDENT IN AN ORGANIZED HEALTH CARE EDUCATION/TRAINING PROGRAM

## 2025-06-25 PROCEDURE — 1126F AMNT PAIN NOTED NONE PRSNT: CPT | Performed by: STUDENT IN AN ORGANIZED HEALTH CARE EDUCATION/TRAINING PROGRAM

## 2025-06-25 PROCEDURE — 99495 TRANSJ CARE MGMT MOD F2F 14D: CPT | Performed by: STUDENT IN AN ORGANIZED HEALTH CARE EDUCATION/TRAINING PROGRAM

## 2025-06-25 PROCEDURE — 1111F DSCHRG MED/CURRENT MED MERGE: CPT | Performed by: STUDENT IN AN ORGANIZED HEALTH CARE EDUCATION/TRAINING PROGRAM

## 2025-06-25 PROCEDURE — 3075F SYST BP GE 130 - 139MM HG: CPT | Performed by: STUDENT IN AN ORGANIZED HEALTH CARE EDUCATION/TRAINING PROGRAM

## 2025-06-25 PROCEDURE — 1159F MED LIST DOCD IN RCRD: CPT | Performed by: STUDENT IN AN ORGANIZED HEALTH CARE EDUCATION/TRAINING PROGRAM

## 2025-06-25 ASSESSMENT — ENCOUNTER SYMPTOMS
OCCASIONAL FEELINGS OF UNSTEADINESS: 1
LOSS OF SENSATION IN FEET: 1
DEPRESSION: 0

## 2025-06-25 ASSESSMENT — PAIN SCALES - GENERAL: PAINLEVEL_OUTOF10: 0-NO PAIN

## 2025-06-25 NOTE — PROGRESS NOTES
"Patient: Mireya Nava  : 1950  PCP: Makenzie Esparza MD  MRN: [id number]  Program: No programs to display       Mireya Nava is a 74 y.o. female presenting today for follow-up after being discharged from the hospital 6 days ago. Discharged from [facility] on 25 to SNF. Was discharged home 25. The main problem requiring admission was hyponatremia and TAMMY 2/2 dehydration. The discharge summary and/or Transitional Care Management documentation was reviewed. Medication reconciliation was performed as indicated via the \"José as Reviewed\" timestamp.     Mireya Nava was contacted by Transitional Care Management services two days after her discharge. This encounter and supporting documentation was reviewed.    She is here today with her daughter who helps to provide the history. Admitted from the ED on 25 after presenting for weakness after reportedly 5-6 days of little to no PO intake in setting of nausea and vomiting. Admitted initially to the ICU. Sodium on presentation was 109. Creatinine 2.68, eGFR 18. No history of CKD. Hyponatremia gradually corrected. Found to have E coli UTI, treated with ceftriaxone, zosyn and then Augmentin for total course 14 days. Campbell catheter placed for urinary retention, has had good urine output without recurrence of retention since removed. Hospitalization was further complicated by new onset Afib. Continued on metoprolol and administered IV heparin and then transitioned to Eliquis. Eliquis discontinued when found to be anemic with hgb drop. Was 10.8 on admission with tomi 6.9. She was treated with IV Venofer and oral ferrous gluconate.  Stool occult remained negative and she was asymptomatic of the anemia per hospital notes. At time of discharge Hgb was 6.9/Hematocrit 20.1 and Cr  0.97 and eGFR 60. She reports she was having regular blood draws while in the SNF, not sure what the status of her anemia is or when it was last checked.     She had " been doing well since returning home. Has been getting [facility] and that is going well. Saw cardiology two days ago for hospital follow up. She will have a repeat echocardiogram and follow up 9/2025. Will continue off anticoagulation until etiology of anemia is further investigated.       Review of Systems   Constitutional:  Positive for fatigue. Negative for chills and fever.   Respiratory:  Negative for chest tightness and shortness of breath.    Cardiovascular:  Negative for chest pain, palpitations and leg swelling.   Gastrointestinal:  Negative for abdominal pain, blood in stool, constipation and diarrhea.   Genitourinary:  Negative for dysuria, frequency and hematuria.   Neurological:  Negative for dizziness and light-headedness.       /78 (BP Location: Left arm, Patient Position: Sitting, BP Cuff Size: Large adult)   Pulse 106   Resp 16   Wt 62.6 kg (138 lb)   SpO2 97%   BMI 23.69 kg/m²     Physical Exam  Vitals and nursing note reviewed.   Constitutional:       General: She is not in acute distress.     Appearance: She is not ill-appearing.   Cardiovascular:      Rate and Rhythm: Normal rate and regular rhythm.      Heart sounds: Murmur heard.   Pulmonary:      Effort: Pulmonary effort is normal. No respiratory distress.      Breath sounds: Normal breath sounds. No wheezing, rhonchi or rales.   Musculoskeletal:      Right lower leg: No edema.      Left lower leg: No edema.         The complexity of medical decision making for this patient's transitional care is moderate.    Assessment/Plan   Assessment & Plan  Hospital discharge follow-up  Hospital records reviewed and discussed. Medication list reviewed and updated.        Iron deficiency anemia, unspecified iron deficiency anemia type  Downtrending during hospitalization s/p IV Venofer and continues on oral ferrous gluconate. Hemoccult negative in hospital. No symptoms of overt GIB. Treated with IV Venofer and continues on oral iron supplement. Has  appt scheduled with GI. Will try to obtain lab reports from SNF. CBC ordered.      Atrial fibrillation, unspecified type (Multi)  Rate controlled on metoprolol. Remains off anticoagulation until anemia further evaluated.       TAMMY (acute kidney injury)  Renal function improved close to her most recent baseline one year ago.        UTI symptoms  Requested UA to recheck UTI. She is asymptomatic except for possibly a little more urination than baseline, but has been drinking more fluids. No evidence of infection. Reassured.

## 2025-06-30 ENCOUNTER — TELEPHONE (OUTPATIENT)
Dept: PRIMARY CARE | Facility: CLINIC | Age: 75
End: 2025-06-30
Payer: MEDICARE

## 2025-06-30 NOTE — TELEPHONE ENCOUNTER
Pt is still taking the sodium bicarbonate , which she was given in hosp.  She wants to know if she can stop taking this, as it is making her feet and legs swell . Please advise

## 2025-07-01 NOTE — TELEPHONE ENCOUNTER
Was to be taken x 15 days post discharge from hospital - the prescription should have been completed on 6/24

## 2025-07-08 ASSESSMENT — ENCOUNTER SYMPTOMS
FREQUENCY: 0
SHORTNESS OF BREATH: 0
PALPITATIONS: 0
FEVER: 0
CHEST TIGHTNESS: 0
DYSURIA: 0
FATIGUE: 1
DIARRHEA: 0
DIZZINESS: 0
CHILLS: 0
HEMATURIA: 0
CONSTIPATION: 0
BLOOD IN STOOL: 0
ABDOMINAL PAIN: 0
LIGHT-HEADEDNESS: 0

## 2025-07-08 NOTE — ASSESSMENT & PLAN NOTE
Downtrending during hospitalization s/p IV Venofer and continues on oral ferrous gluconate. Hemoccult negative in hospital. No symptoms of overt GIB. Treated with IV Venofer and continues on oral iron supplement. Has appt scheduled with GI. Will try to obtain lab reports from SNF. CBC ordered.

## 2025-07-10 ENCOUNTER — TELEPHONE (OUTPATIENT)
Dept: PRIMARY CARE | Facility: CLINIC | Age: 75
End: 2025-07-10
Payer: MEDICARE

## 2025-07-10 NOTE — TELEPHONE ENCOUNTER
Pt was seen by GI and they did the scope, all was good, no bleeding. He thought maybe she should see hematology .  Pt wasn't to know if she can take iron and see if that helps.  Wants to know if you want another blood test to see where levels are.     Also, they stopped her antbx for uti when she left facility . She feels like its not gone. No sx.      Please advise [phone]. Can leave message

## 2025-07-10 NOTE — TELEPHONE ENCOUNTER
She has orders in for CMP and CBC to evaluate her kidney function and blood counts. We can determine next steps based on results. She should continue taking the iron supplement. If she has no urinary symptoms then no need to check the urine again.

## 2025-07-16 LAB
ALBUMIN SERPL-MCNC: 4.3 G/DL (ref 3.6–5.1)
ALP SERPL-CCNC: 67 U/L (ref 37–153)
ALT SERPL-CCNC: 10 U/L (ref 6–29)
ANION GAP SERPL CALCULATED.4IONS-SCNC: 7 MMOL/L (CALC) (ref 7–17)
AST SERPL-CCNC: 16 U/L (ref 10–35)
BASOPHILS # BLD AUTO: 79 CELLS/UL (ref 0–200)
BASOPHILS NFR BLD AUTO: 1 %
BILIRUB SERPL-MCNC: 0.3 MG/DL (ref 0.2–1.2)
BUN SERPL-MCNC: 31 MG/DL (ref 7–25)
CALCIUM SERPL-MCNC: 9.4 MG/DL (ref 8.6–10.4)
CHLORIDE SERPL-SCNC: 97 MMOL/L (ref 98–110)
CO2 SERPL-SCNC: 26 MMOL/L (ref 20–32)
CREAT SERPL-MCNC: 1.18 MG/DL (ref 0.6–1)
EGFRCR SERPLBLD CKD-EPI 2021: 48 ML/MIN/1.73M2
EOSINOPHIL # BLD AUTO: 292 CELLS/UL (ref 15–500)
EOSINOPHIL NFR BLD AUTO: 3.7 %
ERYTHROCYTE [DISTWIDTH] IN BLOOD BY AUTOMATED COUNT: 14.7 % (ref 11–15)
GLUCOSE SERPL-MCNC: 118 MG/DL (ref 65–139)
HCT VFR BLD AUTO: 28.8 % (ref 35–45)
HGB BLD-MCNC: 9.1 G/DL (ref 11.7–15.5)
LYMPHOCYTES # BLD AUTO: 1841 CELLS/UL (ref 850–3900)
LYMPHOCYTES NFR BLD AUTO: 23.3 %
MCH RBC QN AUTO: 29.6 PG (ref 27–33)
MCHC RBC AUTO-ENTMCNC: 31.6 G/DL (ref 32–36)
MCV RBC AUTO: 93.8 FL (ref 80–100)
MONOCYTES # BLD AUTO: 656 CELLS/UL (ref 200–950)
MONOCYTES NFR BLD AUTO: 8.3 %
NEUTROPHILS # BLD AUTO: 5032 CELLS/UL (ref 1500–7800)
NEUTROPHILS NFR BLD AUTO: 63.7 %
PLATELET # BLD AUTO: 385 THOUSAND/UL (ref 140–400)
PMV BLD REES-ECKER: 10.4 FL (ref 7.5–12.5)
POTASSIUM SERPL-SCNC: 4.6 MMOL/L (ref 3.5–5.3)
PROT SERPL-MCNC: 8.2 G/DL (ref 6.1–8.1)
RBC # BLD AUTO: 3.07 MILLION/UL (ref 3.8–5.1)
SODIUM SERPL-SCNC: 130 MMOL/L (ref 135–146)
WBC # BLD AUTO: 7.9 THOUSAND/UL (ref 3.8–10.8)

## 2025-07-17 ENCOUNTER — TELEPHONE (OUTPATIENT)
Dept: HEMATOLOGY/ONCOLOGY | Facility: HOSPITAL | Age: 75
End: 2025-07-17
Payer: MEDICARE

## 2025-07-17 NOTE — TELEPHONE ENCOUNTER
Reason for Conversation  Appointment    Background   Received Venofer 200 mg x 5 doses orders from ordering provider, Henok Logan CNP. Patient has Medicare Part A and B with [employer] as secondary. Pre-cert NPN. Attempted to reach patient to schedule infusions. Left detailed message, with call back number, for patient to call back at earliest convenience.

## 2025-07-19 PROBLEM — D50.9 IRON DEFICIENCY ANEMIA, UNSPECIFIED: Status: ACTIVE | Noted: 2025-07-19

## 2025-07-19 RX ORDER — HEPARIN SODIUM,PORCINE/PF 10 UNIT/ML
50 SYRINGE (ML) INTRAVENOUS AS NEEDED
OUTPATIENT
Start: 2025-07-22

## 2025-07-21 NOTE — TELEPHONE ENCOUNTER
Reason for Conversation  Appointment    Background   Patient returned phone call and agreed to proceed with scheduling. Patient is scheduled to begin Venofer 200 mg infusions starting tomorrow, 7/22/25 at 8:30 AM, for a total of 5 doses. Email sent to pharmacy to request . Orders were entered accordingly.

## 2025-07-22 ENCOUNTER — INFUSION (OUTPATIENT)
Dept: HEMATOLOGY/ONCOLOGY | Facility: HOSPITAL | Age: 75
End: 2025-07-22
Payer: MEDICARE

## 2025-07-22 VITALS
DIASTOLIC BLOOD PRESSURE: 70 MMHG | HEIGHT: 63 IN | RESPIRATION RATE: 17 BRPM | SYSTOLIC BLOOD PRESSURE: 120 MMHG | BODY MASS INDEX: 24.21 KG/M2 | TEMPERATURE: 96.4 F | OXYGEN SATURATION: 98 % | HEART RATE: 67 BPM

## 2025-07-22 DIAGNOSIS — D50.9 IRON DEFICIENCY ANEMIA, UNSPECIFIED IRON DEFICIENCY ANEMIA TYPE: Primary | ICD-10-CM

## 2025-07-22 LAB
ERYTHROCYTE [DISTWIDTH] IN BLOOD BY AUTOMATED COUNT: 16.1 % (ref 11.5–14.5)
FERRITIN SERPL-MCNC: 210 NG/ML (ref 8–150)
HCT VFR BLD AUTO: 29.2 % (ref 36–46)
HGB BLD-MCNC: 9.6 G/DL (ref 12–16)
IRON SATN MFR SERPL: 16 % (ref 25–45)
IRON SERPL-MCNC: 57 UG/DL (ref 35–150)
MCH RBC QN AUTO: 29.9 PG (ref 26–34)
MCHC RBC AUTO-ENTMCNC: 32.9 G/DL (ref 32–36)
MCV RBC AUTO: 91 FL (ref 80–100)
NRBC BLD-RTO: 0 /100 WBCS (ref 0–0)
PLATELET # BLD AUTO: 354 X10*3/UL (ref 150–450)
RBC # BLD AUTO: 3.21 X10*6/UL (ref 4–5.2)
TIBC SERPL-MCNC: 346 UG/DL (ref 240–445)
UIBC SERPL-MCNC: 289 UG/DL (ref 110–370)
WBC # BLD AUTO: 6.9 X10*3/UL (ref 4.4–11.3)

## 2025-07-22 PROCEDURE — 85027 COMPLETE CBC AUTOMATED: CPT

## 2025-07-22 PROCEDURE — 96374 THER/PROPH/DIAG INJ IV PUSH: CPT | Mod: INF,INF2

## 2025-07-22 PROCEDURE — 83540 ASSAY OF IRON: CPT

## 2025-07-22 PROCEDURE — 82728 ASSAY OF FERRITIN: CPT

## 2025-07-22 PROCEDURE — 2500000004 HC RX 250 GENERAL PHARMACY W/ HCPCS (ALT 636 FOR OP/ED)

## 2025-07-22 RX ORDER — EPINEPHRINE 0.3 MG/.3ML
0.3 INJECTION SUBCUTANEOUS EVERY 5 MIN PRN
Status: CANCELLED | OUTPATIENT
Start: 2025-07-25

## 2025-07-22 RX ORDER — FAMOTIDINE 10 MG/ML
20 INJECTION, SOLUTION INTRAVENOUS ONCE AS NEEDED
Status: CANCELLED | OUTPATIENT
Start: 2025-07-25

## 2025-07-22 RX ORDER — ALBUTEROL SULFATE 0.83 MG/ML
3 SOLUTION RESPIRATORY (INHALATION) AS NEEDED
Status: CANCELLED | OUTPATIENT
Start: 2025-07-25

## 2025-07-22 RX ORDER — DIPHENHYDRAMINE HYDROCHLORIDE 50 MG/ML
50 INJECTION, SOLUTION INTRAMUSCULAR; INTRAVENOUS AS NEEDED
Status: CANCELLED | OUTPATIENT
Start: 2025-07-25

## 2025-07-22 RX ORDER — FAMOTIDINE 10 MG/ML
20 INJECTION, SOLUTION INTRAVENOUS ONCE AS NEEDED
Status: DISCONTINUED | OUTPATIENT
Start: 2025-07-22 | End: 2025-07-22 | Stop reason: HOSPADM

## 2025-07-22 RX ORDER — EPINEPHRINE 0.3 MG/.3ML
0.3 INJECTION SUBCUTANEOUS EVERY 5 MIN PRN
Status: DISCONTINUED | OUTPATIENT
Start: 2025-07-22 | End: 2025-07-22 | Stop reason: HOSPADM

## 2025-07-22 RX ORDER — ALBUTEROL SULFATE 0.83 MG/ML
3 SOLUTION RESPIRATORY (INHALATION) AS NEEDED
Status: DISCONTINUED | OUTPATIENT
Start: 2025-07-22 | End: 2025-07-22 | Stop reason: HOSPADM

## 2025-07-22 RX ORDER — DIPHENHYDRAMINE HYDROCHLORIDE 50 MG/ML
50 INJECTION, SOLUTION INTRAMUSCULAR; INTRAVENOUS AS NEEDED
Status: DISCONTINUED | OUTPATIENT
Start: 2025-07-22 | End: 2025-07-22 | Stop reason: HOSPADM

## 2025-07-22 RX ADMIN — IRON SUCROSE 200 MG: 20 INJECTION, SOLUTION INTRAVENOUS at 08:40

## 2025-07-22 ASSESSMENT — PATIENT HEALTH QUESTIONNAIRE - PHQ9
1. LITTLE INTEREST OR PLEASURE IN DOING THINGS: NOT AT ALL
SUM OF ALL RESPONSES TO PHQ9 QUESTIONS 1 & 2: 0
2. FEELING DOWN, DEPRESSED OR HOPELESS: NOT AT ALL

## 2025-07-22 ASSESSMENT — COLUMBIA-SUICIDE SEVERITY RATING SCALE - C-SSRS
6. HAVE YOU EVER DONE ANYTHING, STARTED TO DO ANYTHING, OR PREPARED TO DO ANYTHING TO END YOUR LIFE?: NO
2. HAVE YOU ACTUALLY HAD ANY THOUGHTS OF KILLING YOURSELF?: NO
1. IN THE PAST MONTH, HAVE YOU WISHED YOU WERE DEAD OR WISHED YOU COULD GO TO SLEEP AND NOT WAKE UP?: NO

## 2025-07-22 ASSESSMENT — ENCOUNTER SYMPTOMS
OCCASIONAL FEELINGS OF UNSTEADINESS: 0
DEPRESSION: 0
LOSS OF SENSATION IN FEET: 0

## 2025-07-22 ASSESSMENT — PAIN SCALES - GENERAL: PAINLEVEL_OUTOF10: 0-NO PAIN

## 2025-07-25 ENCOUNTER — INFUSION (OUTPATIENT)
Dept: HEMATOLOGY/ONCOLOGY | Facility: HOSPITAL | Age: 75
End: 2025-07-25
Payer: MEDICARE

## 2025-07-25 VITALS
SYSTOLIC BLOOD PRESSURE: 130 MMHG | DIASTOLIC BLOOD PRESSURE: 80 MMHG | TEMPERATURE: 97 F | BODY MASS INDEX: 24.56 KG/M2 | HEART RATE: 72 BPM | OXYGEN SATURATION: 98 % | WEIGHT: 139.99 LBS | RESPIRATION RATE: 17 BRPM

## 2025-07-25 DIAGNOSIS — D50.9 IRON DEFICIENCY ANEMIA, UNSPECIFIED IRON DEFICIENCY ANEMIA TYPE: ICD-10-CM

## 2025-07-25 PROCEDURE — 96374 THER/PROPH/DIAG INJ IV PUSH: CPT | Mod: INF,INF2

## 2025-07-25 PROCEDURE — 2500000004 HC RX 250 GENERAL PHARMACY W/ HCPCS (ALT 636 FOR OP/ED)

## 2025-07-25 RX ORDER — EPINEPHRINE 0.3 MG/.3ML
0.3 INJECTION SUBCUTANEOUS EVERY 5 MIN PRN
OUTPATIENT
Start: 2025-07-28

## 2025-07-25 RX ORDER — FAMOTIDINE 10 MG/ML
20 INJECTION, SOLUTION INTRAVENOUS ONCE AS NEEDED
OUTPATIENT
Start: 2025-07-28

## 2025-07-25 RX ORDER — DIPHENHYDRAMINE HYDROCHLORIDE 50 MG/ML
50 INJECTION, SOLUTION INTRAMUSCULAR; INTRAVENOUS AS NEEDED
OUTPATIENT
Start: 2025-07-28

## 2025-07-25 RX ORDER — ALBUTEROL SULFATE 0.83 MG/ML
3 SOLUTION RESPIRATORY (INHALATION) AS NEEDED
OUTPATIENT
Start: 2025-07-28

## 2025-07-25 RX ADMIN — IRON SUCROSE 200 MG: 20 INJECTION, SOLUTION INTRAVENOUS at 08:35

## 2025-07-25 ASSESSMENT — PATIENT HEALTH QUESTIONNAIRE - PHQ9
SUM OF ALL RESPONSES TO PHQ9 QUESTIONS 1 & 2: 0
2. FEELING DOWN, DEPRESSED OR HOPELESS: NOT AT ALL
1. LITTLE INTEREST OR PLEASURE IN DOING THINGS: NOT AT ALL

## 2025-07-25 ASSESSMENT — PAIN SCALES - GENERAL: PAINLEVEL_OUTOF10: 0-NO PAIN

## 2025-07-28 ENCOUNTER — INFUSION (OUTPATIENT)
Dept: HEMATOLOGY/ONCOLOGY | Facility: HOSPITAL | Age: 75
End: 2025-07-28
Payer: MEDICARE

## 2025-07-28 VITALS
RESPIRATION RATE: 17 BRPM | HEART RATE: 66 BPM | OXYGEN SATURATION: 97 % | DIASTOLIC BLOOD PRESSURE: 75 MMHG | WEIGHT: 138.23 LBS | TEMPERATURE: 97.2 F | BODY MASS INDEX: 24.25 KG/M2 | SYSTOLIC BLOOD PRESSURE: 133 MMHG

## 2025-07-28 DIAGNOSIS — D50.9 IRON DEFICIENCY ANEMIA, UNSPECIFIED IRON DEFICIENCY ANEMIA TYPE: ICD-10-CM

## 2025-07-28 PROCEDURE — 96374 THER/PROPH/DIAG INJ IV PUSH: CPT | Mod: INF,INF2

## 2025-07-28 PROCEDURE — 2500000004 HC RX 250 GENERAL PHARMACY W/ HCPCS (ALT 636 FOR OP/ED)

## 2025-07-28 RX ORDER — ALBUTEROL SULFATE 0.83 MG/ML
3 SOLUTION RESPIRATORY (INHALATION) AS NEEDED
Status: CANCELLED | OUTPATIENT
Start: 2025-07-31

## 2025-07-28 RX ORDER — FAMOTIDINE 10 MG/ML
20 INJECTION, SOLUTION INTRAVENOUS ONCE AS NEEDED
Status: CANCELLED | OUTPATIENT
Start: 2025-07-31

## 2025-07-28 RX ORDER — DIPHENHYDRAMINE HYDROCHLORIDE 50 MG/ML
50 INJECTION, SOLUTION INTRAMUSCULAR; INTRAVENOUS AS NEEDED
Status: CANCELLED | OUTPATIENT
Start: 2025-07-31

## 2025-07-28 RX ORDER — EPINEPHRINE 0.3 MG/.3ML
0.3 INJECTION SUBCUTANEOUS EVERY 5 MIN PRN
Status: CANCELLED | OUTPATIENT
Start: 2025-07-31

## 2025-07-28 RX ADMIN — IRON SUCROSE 200 MG: 20 INJECTION, SOLUTION INTRAVENOUS at 08:35

## 2025-07-28 ASSESSMENT — PAIN SCALES - GENERAL: PAINLEVEL_OUTOF10: 0-NO PAIN

## 2025-07-30 ENCOUNTER — OFFICE VISIT (OUTPATIENT)
Dept: PRIMARY CARE | Facility: CLINIC | Age: 75
End: 2025-07-30
Payer: MEDICARE

## 2025-07-30 ENCOUNTER — TELEPHONE (OUTPATIENT)
Dept: PRIMARY CARE | Facility: CLINIC | Age: 75
End: 2025-07-30

## 2025-07-30 VITALS
OXYGEN SATURATION: 99 % | WEIGHT: 139 LBS | HEART RATE: 73 BPM | TEMPERATURE: 97.8 F | BODY MASS INDEX: 24.38 KG/M2 | RESPIRATION RATE: 18 BRPM | DIASTOLIC BLOOD PRESSURE: 78 MMHG | SYSTOLIC BLOOD PRESSURE: 138 MMHG

## 2025-07-30 DIAGNOSIS — E78.5 HYPERLIPIDEMIA, UNSPECIFIED HYPERLIPIDEMIA TYPE: ICD-10-CM

## 2025-07-30 DIAGNOSIS — D50.9 IRON DEFICIENCY ANEMIA, UNSPECIFIED IRON DEFICIENCY ANEMIA TYPE: ICD-10-CM

## 2025-07-30 DIAGNOSIS — R39.9 UTI SYMPTOMS: ICD-10-CM

## 2025-07-30 DIAGNOSIS — I10 PRIMARY HYPERTENSION: ICD-10-CM

## 2025-07-30 DIAGNOSIS — I10 ESSENTIAL (PRIMARY) HYPERTENSION: ICD-10-CM

## 2025-07-30 LAB
POC APPEARANCE, URINE: CLEAR
POC BILIRUBIN, URINE: NEGATIVE
POC BLOOD, URINE: ABNORMAL
POC COLOR, URINE: YELLOW
POC GLUCOSE, URINE: NEGATIVE MG/DL
POC KETONES, URINE: NEGATIVE MG/DL
POC LEUKOCYTES, URINE: ABNORMAL
POC NITRITE,URINE: NEGATIVE
POC PH, URINE: 7 PH
POC PROTEIN, URINE: NEGATIVE MG/DL
POC SPECIFIC GRAVITY, URINE: 1.01
POC UROBILINOGEN, URINE: 0.2 EU/DL

## 2025-07-30 PROCEDURE — 1125F AMNT PAIN NOTED PAIN PRSNT: CPT

## 2025-07-30 PROCEDURE — 1036F TOBACCO NON-USER: CPT

## 2025-07-30 PROCEDURE — 1160F RVW MEDS BY RX/DR IN RCRD: CPT

## 2025-07-30 PROCEDURE — 3078F DIAST BP <80 MM HG: CPT

## 2025-07-30 PROCEDURE — 81003 URINALYSIS AUTO W/O SCOPE: CPT

## 2025-07-30 PROCEDURE — 99213 OFFICE O/P EST LOW 20 MIN: CPT

## 2025-07-30 PROCEDURE — 1159F MED LIST DOCD IN RCRD: CPT

## 2025-07-30 PROCEDURE — G2211 COMPLEX E/M VISIT ADD ON: HCPCS

## 2025-07-30 PROCEDURE — 3075F SYST BP GE 130 - 139MM HG: CPT

## 2025-07-30 RX ORDER — NITROFURANTOIN 25; 75 MG/1; MG/1
100 CAPSULE ORAL 2 TIMES DAILY
Qty: 10 CAPSULE | Refills: 0 | Status: SHIPPED | OUTPATIENT
Start: 2025-07-30 | End: 2025-08-04

## 2025-07-30 RX ORDER — LOSARTAN POTASSIUM 100 MG/1
100 TABLET ORAL DAILY
Qty: 90 TABLET | Refills: 0 | Status: SHIPPED | OUTPATIENT
Start: 2025-07-30

## 2025-07-30 ASSESSMENT — PATIENT HEALTH QUESTIONNAIRE - PHQ9
2. FEELING DOWN, DEPRESSED OR HOPELESS: NOT AT ALL
1. LITTLE INTEREST OR PLEASURE IN DOING THINGS: NOT AT ALL
SUM OF ALL RESPONSES TO PHQ9 QUESTIONS 1 AND 2: 0

## 2025-07-30 ASSESSMENT — COLUMBIA-SUICIDE SEVERITY RATING SCALE - C-SSRS: 1. IN THE PAST MONTH, HAVE YOU WISHED YOU WERE DEAD OR WISHED YOU COULD GO TO SLEEP AND NOT WAKE UP?: NO

## 2025-07-30 ASSESSMENT — PAIN SCALES - GENERAL: PAINLEVEL_OUTOF10: 2

## 2025-07-30 NOTE — ASSESSMENT & PLAN NOTE
Orders:    losartan (Cozaar) 100 mg tablet; Take 1 tablet (100 mg) by mouth once daily.    Needs refill - Will schedule her CPE before leaving

## 2025-07-30 NOTE — PROGRESS NOTES
Subjective   Patient ID: Mireya Nava is a 75 y.o. female who presents for UTI (Pt c/o UTI Sx cloudy urine and urinary frequency x few days).    Mireya is a 74 yo female presenting with concerns for UTI. Noticed cloudiness in urine yesterday so she drank a lot of water. Less cloudy now. Has urinary frequency that she believes is worse recently. Reports feeling a pinch/urge prior to urinating. No fevers, no back pain       Patient Care Team:  Makenzie Esparza MD as PCP - General (Family Medicine)  Leon Garner MD as Surgeon (General Surgery)  Jonah Navarro MD as Surgeon (Gastroenterology)    PMH, PSH, family history and social history were reviewed and updated.    Review of Systems  All other systems have been reviewed and are negative except as noted in the HPI.     Objective   /78 (BP Location: Left arm, Patient Position: Sitting)   Pulse 73   Temp 36.6 °C (97.8 °F)   Resp 18   Wt 63 kg (139 lb)   SpO2 99%   BMI 24.38 kg/m²     Physical Exam  Vitals and nursing note reviewed.   Constitutional:       General: She is not in acute distress.     Appearance: Normal appearance. She is not ill-appearing.   Pulmonary:      Effort: Pulmonary effort is normal.   Abdominal:      Tenderness: There is no right CVA tenderness or left CVA tenderness.     Skin:     General: Skin is warm and dry.     Neurological:      Mental Status: She is alert and oriented to person, place, and time.     Psychiatric:         Mood and Affect: Mood normal.         Behavior: Behavior normal.         Assessment/Plan   Assessment & Plan  UTI symptoms    Orders:    POCT UA Automated manually resulted    Urine Culture; Future    nitrofurantoin, macrocrystal-monohydrate, (Macrobid) 100 mg capsule; Take 1 capsule (100 mg) by mouth 2 times a day for 5 days.    UA suspicious for UTI  Will get urine culture to ensure correct antibiotic selection  Please take entire prescription, take with food.   Increase fluids  Proper hygiene  discussed  Tylenol/ibuprofen PRN for pain    Primary hypertension    Orders:    losartan (Cozaar) 100 mg tablet; Take 1 tablet (100 mg) by mouth once daily.    Needs refill - Will schedule her CPE before leaving      Follow up PRN, sooner with any problems or concerns.

## 2025-07-31 ENCOUNTER — INFUSION (OUTPATIENT)
Dept: HEMATOLOGY/ONCOLOGY | Facility: HOSPITAL | Age: 75
End: 2025-07-31
Payer: MEDICARE

## 2025-07-31 VITALS
TEMPERATURE: 96.4 F | RESPIRATION RATE: 16 BRPM | HEART RATE: 63 BPM | DIASTOLIC BLOOD PRESSURE: 73 MMHG | SYSTOLIC BLOOD PRESSURE: 132 MMHG | OXYGEN SATURATION: 98 %

## 2025-07-31 DIAGNOSIS — D50.9 IRON DEFICIENCY ANEMIA, UNSPECIFIED IRON DEFICIENCY ANEMIA TYPE: ICD-10-CM

## 2025-07-31 PROCEDURE — 96374 THER/PROPH/DIAG INJ IV PUSH: CPT | Mod: INF,INF2

## 2025-07-31 PROCEDURE — 2500000004 HC RX 250 GENERAL PHARMACY W/ HCPCS (ALT 636 FOR OP/ED)

## 2025-07-31 RX ORDER — EPINEPHRINE 0.3 MG/.3ML
0.3 INJECTION SUBCUTANEOUS EVERY 5 MIN PRN
Status: DISCONTINUED | OUTPATIENT
Start: 2025-07-31 | End: 2025-07-31 | Stop reason: HOSPADM

## 2025-07-31 RX ORDER — FAMOTIDINE 10 MG/ML
20 INJECTION, SOLUTION INTRAVENOUS ONCE AS NEEDED
Status: DISCONTINUED | OUTPATIENT
Start: 2025-07-31 | End: 2025-07-31 | Stop reason: HOSPADM

## 2025-07-31 RX ORDER — EPINEPHRINE 0.3 MG/.3ML
0.3 INJECTION SUBCUTANEOUS EVERY 5 MIN PRN
OUTPATIENT
Start: 2025-08-03

## 2025-07-31 RX ORDER — FAMOTIDINE 10 MG/ML
20 INJECTION, SOLUTION INTRAVENOUS ONCE AS NEEDED
OUTPATIENT
Start: 2025-08-03

## 2025-07-31 RX ORDER — ALBUTEROL SULFATE 0.83 MG/ML
3 SOLUTION RESPIRATORY (INHALATION) AS NEEDED
OUTPATIENT
Start: 2025-08-03

## 2025-07-31 RX ORDER — ALBUTEROL SULFATE 0.83 MG/ML
3 SOLUTION RESPIRATORY (INHALATION) AS NEEDED
Status: DISCONTINUED | OUTPATIENT
Start: 2025-07-31 | End: 2025-07-31 | Stop reason: HOSPADM

## 2025-07-31 RX ORDER — DIPHENHYDRAMINE HYDROCHLORIDE 50 MG/ML
50 INJECTION, SOLUTION INTRAMUSCULAR; INTRAVENOUS AS NEEDED
Status: DISCONTINUED | OUTPATIENT
Start: 2025-07-31 | End: 2025-07-31 | Stop reason: HOSPADM

## 2025-07-31 RX ORDER — DIPHENHYDRAMINE HYDROCHLORIDE 50 MG/ML
50 INJECTION, SOLUTION INTRAMUSCULAR; INTRAVENOUS AS NEEDED
OUTPATIENT
Start: 2025-08-03

## 2025-07-31 RX ADMIN — IRON SUCROSE 200 MG: 20 INJECTION, SOLUTION INTRAVENOUS at 08:51

## 2025-07-31 ASSESSMENT — PAIN SCALES - GENERAL: PAINLEVEL_OUTOF10: 0-NO PAIN

## 2025-08-01 LAB — BACTERIA UR CULT: ABNORMAL

## 2025-08-04 ENCOUNTER — INFUSION (OUTPATIENT)
Dept: HEMATOLOGY/ONCOLOGY | Facility: HOSPITAL | Age: 75
End: 2025-08-04
Payer: MEDICARE

## 2025-08-04 VITALS
WEIGHT: 140.87 LBS | OXYGEN SATURATION: 98 % | TEMPERATURE: 97.3 F | DIASTOLIC BLOOD PRESSURE: 78 MMHG | BODY MASS INDEX: 24.71 KG/M2 | RESPIRATION RATE: 17 BRPM | HEART RATE: 66 BPM | SYSTOLIC BLOOD PRESSURE: 130 MMHG

## 2025-08-04 DIAGNOSIS — D50.9 IRON DEFICIENCY ANEMIA, UNSPECIFIED IRON DEFICIENCY ANEMIA TYPE: ICD-10-CM

## 2025-08-04 PROCEDURE — 96374 THER/PROPH/DIAG INJ IV PUSH: CPT | Mod: INF,INF2

## 2025-08-04 PROCEDURE — 2500000004 HC RX 250 GENERAL PHARMACY W/ HCPCS (ALT 636 FOR OP/ED)

## 2025-08-04 RX ORDER — EPINEPHRINE 0.3 MG/.3ML
0.3 INJECTION SUBCUTANEOUS EVERY 5 MIN PRN
OUTPATIENT
Start: 2025-08-06

## 2025-08-04 RX ORDER — FAMOTIDINE 10 MG/ML
20 INJECTION, SOLUTION INTRAVENOUS ONCE AS NEEDED
OUTPATIENT
Start: 2025-08-06

## 2025-08-04 RX ORDER — DIPHENHYDRAMINE HYDROCHLORIDE 50 MG/ML
50 INJECTION, SOLUTION INTRAMUSCULAR; INTRAVENOUS AS NEEDED
OUTPATIENT
Start: 2025-08-06

## 2025-08-04 RX ORDER — ALBUTEROL SULFATE 0.83 MG/ML
3 SOLUTION RESPIRATORY (INHALATION) AS NEEDED
OUTPATIENT
Start: 2025-08-06

## 2025-08-04 RX ADMIN — IRON SUCROSE 200 MG: 20 INJECTION, SOLUTION INTRAVENOUS at 08:40

## 2025-08-04 ASSESSMENT — PAIN SCALES - GENERAL: PAINLEVEL_OUTOF10: 0-NO PAIN

## 2025-08-25 ENCOUNTER — OFFICE VISIT (OUTPATIENT)
Dept: PRIMARY CARE | Facility: CLINIC | Age: 75
End: 2025-08-25
Payer: MEDICARE

## 2025-08-25 VITALS
BODY MASS INDEX: 24.56 KG/M2 | WEIGHT: 140 LBS | HEART RATE: 77 BPM | DIASTOLIC BLOOD PRESSURE: 80 MMHG | OXYGEN SATURATION: 99 % | SYSTOLIC BLOOD PRESSURE: 128 MMHG | TEMPERATURE: 98.5 F | RESPIRATION RATE: 18 BRPM

## 2025-08-25 DIAGNOSIS — N30.01 ACUTE CYSTITIS WITH HEMATURIA: Primary | ICD-10-CM

## 2025-08-25 DIAGNOSIS — N39.0 RECURRENT UTI: ICD-10-CM

## 2025-08-25 DIAGNOSIS — R39.9 UTI SYMPTOMS: ICD-10-CM

## 2025-08-25 LAB
POC APPEARANCE, URINE: CLEAR
POC BILIRUBIN, URINE: NEGATIVE
POC BLOOD, URINE: ABNORMAL
POC COLOR, URINE: YELLOW
POC GLUCOSE, URINE: NEGATIVE MG/DL
POC KETONES, URINE: NEGATIVE MG/DL
POC LEUKOCYTES, URINE: ABNORMAL
POC NITRITE,URINE: POSITIVE
POC PH, URINE: 7 PH
POC PROTEIN, URINE: NEGATIVE MG/DL
POC SPECIFIC GRAVITY, URINE: 1.01
POC UROBILINOGEN, URINE: 0.2 EU/DL

## 2025-08-25 PROCEDURE — 99213 OFFICE O/P EST LOW 20 MIN: CPT

## 2025-08-25 PROCEDURE — 1126F AMNT PAIN NOTED NONE PRSNT: CPT

## 2025-08-25 PROCEDURE — 1159F MED LIST DOCD IN RCRD: CPT

## 2025-08-25 PROCEDURE — 1036F TOBACCO NON-USER: CPT

## 2025-08-25 PROCEDURE — 81003 URINALYSIS AUTO W/O SCOPE: CPT

## 2025-08-25 PROCEDURE — 3074F SYST BP LT 130 MM HG: CPT

## 2025-08-25 PROCEDURE — 3079F DIAST BP 80-89 MM HG: CPT

## 2025-08-25 RX ORDER — METOPROLOL SUCCINATE 100 MG/1
100 TABLET, EXTENDED RELEASE ORAL DAILY
Qty: 90 TABLET | Refills: 1 | Status: CANCELLED | OUTPATIENT
Start: 2025-08-25

## 2025-08-25 RX ORDER — AMLODIPINE BESYLATE 10 MG/1
10 TABLET ORAL DAILY
Qty: 90 TABLET | Refills: 1 | Status: CANCELLED | OUTPATIENT
Start: 2025-08-25

## 2025-08-25 RX ORDER — CIPROFLOXACIN 250 MG/1
250 TABLET, FILM COATED ORAL 2 TIMES DAILY
Qty: 10 TABLET | Refills: 0 | Status: SHIPPED | OUTPATIENT
Start: 2025-08-25 | End: 2025-08-30

## 2025-08-25 ASSESSMENT — ANXIETY QUESTIONNAIRES
1. FEELING NERVOUS, ANXIOUS, OR ON EDGE: SEVERAL DAYS
IF YOU CHECKED OFF ANY PROBLEMS ON THIS QUESTIONNAIRE, HOW DIFFICULT HAVE THESE PROBLEMS MADE IT FOR YOU TO DO YOUR WORK, TAKE CARE OF THINGS AT HOME, OR GET ALONG WITH OTHER PEOPLE: SOMEWHAT DIFFICULT
3. WORRYING TOO MUCH ABOUT DIFFERENT THINGS: SEVERAL DAYS
4. TROUBLE RELAXING: SEVERAL DAYS
6. BECOMING EASILY ANNOYED OR IRRITABLE: NOT AT ALL
2. NOT BEING ABLE TO STOP OR CONTROL WORRYING: SEVERAL DAYS
5. BEING SO RESTLESS THAT IT IS HARD TO SIT STILL: SEVERAL DAYS
GAD7 TOTAL SCORE: 6
7. FEELING AFRAID AS IF SOMETHING AWFUL MIGHT HAPPEN: SEVERAL DAYS

## 2025-08-25 ASSESSMENT — ENCOUNTER SYMPTOMS
DYSURIA: 0
FLANK PAIN: 0
HEMATURIA: 0
DIARRHEA: 0
CHILLS: 0
FEVER: 0
NAUSEA: 0
FREQUENCY: 1
ABDOMINAL PAIN: 1
VOMITING: 0

## 2025-08-25 ASSESSMENT — COLUMBIA-SUICIDE SEVERITY RATING SCALE - C-SSRS: 1. IN THE PAST MONTH, HAVE YOU WISHED YOU WERE DEAD OR WISHED YOU COULD GO TO SLEEP AND NOT WAKE UP?: NO

## 2025-08-25 ASSESSMENT — PAIN SCALES - GENERAL: PAINLEVEL_OUTOF10: 0-NO PAIN

## 2025-08-28 LAB — BACTERIA UR CULT: ABNORMAL

## 2025-09-03 ENCOUNTER — TELEPHONE (OUTPATIENT)
Dept: PRIMARY CARE | Facility: CLINIC | Age: 75
End: 2025-09-03
Payer: MEDICARE

## 2025-09-03 DIAGNOSIS — I10 ESSENTIAL (PRIMARY) HYPERTENSION: ICD-10-CM

## 2025-09-04 RX ORDER — METOPROLOL SUCCINATE 100 MG/1
100 TABLET, EXTENDED RELEASE ORAL DAILY
Qty: 90 TABLET | Refills: 1 | Status: SHIPPED | OUTPATIENT
Start: 2025-09-04

## 2025-09-04 RX ORDER — AMLODIPINE BESYLATE 10 MG/1
10 TABLET ORAL DAILY
Qty: 90 TABLET | Refills: 1 | Status: SHIPPED | OUTPATIENT
Start: 2025-09-04

## 2025-09-22 ENCOUNTER — APPOINTMENT (OUTPATIENT)
Dept: CARDIOLOGY | Facility: CLINIC | Age: 75
End: 2025-09-22
Payer: MEDICARE

## 2025-11-03 ENCOUNTER — APPOINTMENT (OUTPATIENT)
Dept: CARDIOLOGY | Facility: CLINIC | Age: 75
End: 2025-11-03
Payer: MEDICARE

## 2025-11-04 ENCOUNTER — APPOINTMENT (OUTPATIENT)
Dept: PRIMARY CARE | Facility: CLINIC | Age: 75
End: 2025-11-04
Payer: MEDICARE

## 2025-11-24 ENCOUNTER — APPOINTMENT (OUTPATIENT)
Dept: CARDIOLOGY | Facility: CLINIC | Age: 75
End: 2025-11-24
Payer: MEDICARE